# Patient Record
Sex: MALE | Race: WHITE | Employment: UNEMPLOYED | URBAN - METROPOLITAN AREA
[De-identification: names, ages, dates, MRNs, and addresses within clinical notes are randomized per-mention and may not be internally consistent; named-entity substitution may affect disease eponyms.]

---

## 2017-01-04 ENCOUNTER — OFFICE VISIT (OUTPATIENT)
Dept: INTERNAL MEDICINE CLINIC | Age: 62
End: 2017-01-04

## 2017-01-04 VITALS
WEIGHT: 138 LBS | DIASTOLIC BLOOD PRESSURE: 90 MMHG | HEART RATE: 68 BPM | SYSTOLIC BLOOD PRESSURE: 148 MMHG | HEIGHT: 65 IN | BODY MASS INDEX: 22.99 KG/M2

## 2017-01-04 DIAGNOSIS — M53.3 SACROILIAC DYSFUNCTION: ICD-10-CM

## 2017-01-04 DIAGNOSIS — F17.200 TOBACCO USE DISORDER: Chronic | ICD-10-CM

## 2017-01-04 DIAGNOSIS — M46.1 SACROILIAC INFLAMMATION (HCC): ICD-10-CM

## 2017-01-04 DIAGNOSIS — Z87.891 HISTORY OF SMOKING 30 OR MORE PACK YEARS: ICD-10-CM

## 2017-01-04 DIAGNOSIS — E87.1 HYPONATREMIA: Primary | ICD-10-CM

## 2017-01-04 LAB
ANION GAP SERPL CALCULATED.3IONS-SCNC: 16 MMOL/L (ref 3–16)
BUN BLDV-MCNC: 11 MG/DL (ref 7–20)
CALCIUM SERPL-MCNC: 8.9 MG/DL (ref 8.3–10.6)
CHLORIDE BLD-SCNC: 98 MMOL/L (ref 99–110)
CO2: 23 MMOL/L (ref 21–32)
CREAT SERPL-MCNC: 1 MG/DL (ref 0.8–1.3)
GFR AFRICAN AMERICAN: >60
GFR NON-AFRICAN AMERICAN: >60
GLUCOSE BLD-MCNC: 110 MG/DL (ref 70–99)
POTASSIUM SERPL-SCNC: 4.3 MMOL/L (ref 3.5–5.1)
SODIUM BLD-SCNC: 137 MMOL/L (ref 136–145)

## 2017-01-04 PROCEDURE — 99213 OFFICE O/P EST LOW 20 MIN: CPT | Performed by: INTERNAL MEDICINE

## 2017-01-04 RX ORDER — HYDROCODONE BITARTRATE AND ACETAMINOPHEN 5; 325 MG/1; MG/1
1 TABLET ORAL EVERY 6 HOURS PRN
Qty: 60 TABLET | Refills: 0 | Status: SHIPPED | OUTPATIENT
Start: 2017-03-12 | End: 2017-04-17 | Stop reason: SDUPTHER

## 2017-01-04 RX ORDER — HYDROCODONE BITARTRATE AND ACETAMINOPHEN 5; 325 MG/1; MG/1
1 TABLET ORAL EVERY 6 HOURS PRN
Qty: 60 TABLET | Refills: 0 | Status: SHIPPED | OUTPATIENT
Start: 2017-02-10 | End: 2017-03-12

## 2017-01-04 RX ORDER — HYDROCODONE BITARTRATE AND ACETAMINOPHEN 5; 325 MG/1; MG/1
1 TABLET ORAL EVERY 6 HOURS PRN
Qty: 60 TABLET | Refills: 0 | Status: SHIPPED | OUTPATIENT
Start: 2017-01-11 | End: 2017-02-10

## 2017-01-09 LAB
6-ACETYLMORPHINE: NOT DETECTED
7-AMINOCLONAZEPAM: NOT DETECTED
ALPHA-OH-ALPRAZOLAM: PRESENT
ALPRAZOLAM: PRESENT
AMPHETAMINE: NOT DETECTED
BARBITURATES: NOT DETECTED
BENZOYLECGONINE: NOT DETECTED
BUPRENORPHINE: NOT DETECTED
CARISOPRODOL: NOT DETECTED
CLONAZEPAM: NOT DETECTED
CODEINE: NOT DETECTED
CREATININE URINE: 82.8 MG/DL (ref 20–400)
DIAZEPAM: NOT DETECTED
DRUGS EXPECTED: NORMAL
EER PAIN MGT DRUG PANEL, HIGH RES/EMIT U: NORMAL
ETHYL GLUCURONIDE: PRESENT
FENTANYL: NOT DETECTED
HYDROCODONE: PRESENT
HYDROMORPHONE: NOT DETECTED
LORAZEPAM: NOT DETECTED
MARIJUANA METABOLITE: NOT DETECTED
MDA: NOT DETECTED
MDEA: NOT DETECTED
MDMA URINE: NOT DETECTED
MEPERIDINE: NOT DETECTED
METHADONE: NOT DETECTED
METHAMPHETAMINE: NOT DETECTED
METHYLPHENIDATE: NOT DETECTED
MIDAZOLAM: NOT DETECTED
MORPHINE: NOT DETECTED
NORBUPRENORPHINE, FREE: NOT DETECTED
NORDIAZEPAM: NOT DETECTED
NORFENTANYL: NOT DETECTED
NORHYDROCODONE, URINE: PRESENT
NOROXYCODONE: NOT DETECTED
NOROXYMORPHONE, URINE: NOT DETECTED
OXAZEPAM: NOT DETECTED
OXYCODONE: NOT DETECTED
OXYMORPHONE: NOT DETECTED
PAIN MANAGEMENT DRUG PANEL: NORMAL
PAIN MANAGEMENT DRUG PANEL: NORMAL
PCP: NOT DETECTED
PHENTERMINE: NOT DETECTED
PROPOXYPHENE: NOT DETECTED
TAPENTADOL, URINE: NOT DETECTED
TAPENTADOL-O-SULFATE, URINE: NOT DETECTED
TEMAZEPAM: NOT DETECTED
TRAMADOL: NOT DETECTED
ZOLPIDEM: NOT DETECTED

## 2017-03-27 ENCOUNTER — OFFICE VISIT (OUTPATIENT)
Dept: INTERNAL MEDICINE CLINIC | Age: 62
End: 2017-03-27

## 2017-03-27 VITALS
HEIGHT: 65 IN | DIASTOLIC BLOOD PRESSURE: 90 MMHG | BODY MASS INDEX: 23.16 KG/M2 | WEIGHT: 139 LBS | HEART RATE: 64 BPM | SYSTOLIC BLOOD PRESSURE: 148 MMHG

## 2017-03-27 DIAGNOSIS — J20.9 ACUTE BRONCHITIS, UNSPECIFIED ORGANISM: ICD-10-CM

## 2017-03-27 DIAGNOSIS — F17.210 NICOTINE DEPENDENCE, CIGARETTES, UNCOMPLICATED: Chronic | ICD-10-CM

## 2017-03-27 DIAGNOSIS — I20.8 ANGINAL CHEST PAIN AT REST (HCC): Primary | ICD-10-CM

## 2017-03-27 DIAGNOSIS — J43.1 PANLOBULAR EMPHYSEMA (HCC): Chronic | ICD-10-CM

## 2017-03-27 PROCEDURE — G9016 DEMO-SMOKING CESSATION COUN: HCPCS | Performed by: INTERNAL MEDICINE

## 2017-03-27 PROCEDURE — 99214 OFFICE O/P EST MOD 30 MIN: CPT | Performed by: INTERNAL MEDICINE

## 2017-03-27 RX ORDER — LEVOFLOXACIN 500 MG/1
500 TABLET, FILM COATED ORAL DAILY
Qty: 10 TABLET | Refills: 0 | Status: SHIPPED | OUTPATIENT
Start: 2017-03-27 | End: 2017-04-06

## 2017-03-27 ASSESSMENT — ENCOUNTER SYMPTOMS
SHORTNESS OF BREATH: 1
APNEA: 0
WHEEZING: 1
COUGH: 1

## 2017-03-28 ENCOUNTER — TELEPHONE (OUTPATIENT)
Dept: INTERNAL MEDICINE CLINIC | Age: 62
End: 2017-03-28

## 2017-03-28 DIAGNOSIS — I10 ESSENTIAL HYPERTENSION: Primary | ICD-10-CM

## 2017-04-04 ENCOUNTER — HOSPITAL ENCOUNTER (OUTPATIENT)
Dept: NON INVASIVE DIAGNOSTICS | Age: 62
Discharge: HOME | End: 2017-04-04
Attending: INTERNAL MEDICINE | Admitting: INTERNAL MEDICINE

## 2017-04-04 LAB
A/G RATIO: 2.2 (ref 1.1–2.2)
ALBUMIN SERPL-MCNC: 4.6 G/DL (ref 3.4–5)
ALP BLD-CCNC: 64 U/L (ref 40–129)
ALT SERPL-CCNC: 19 U/L (ref 10–40)
ANION GAP SERPL CALCULATED.3IONS-SCNC: 14 MMOL/L (ref 3–16)
AST SERPL-CCNC: 21 U/L (ref 15–37)
BILIRUB SERPL-MCNC: 0.4 MG/DL (ref 0–1)
BUN BLDV-MCNC: 6 MG/DL (ref 7–20)
CALCIUM SERPL-MCNC: 8.7 MG/DL (ref 8.3–10.6)
CHLORIDE BLD-SCNC: 99 MMOL/L (ref 99–110)
CO2: 23 MMOL/L (ref 21–32)
CREAT SERPL-MCNC: 0.6 MG/DL (ref 0.8–1.3)
GFR AFRICAN AMERICAN: >60
GFR NON-AFRICAN AMERICAN: >60
GLOBULIN: 2.1 G/DL
GLUCOSE BLD-MCNC: 85 MG/DL (ref 70–99)
LV EF: 73 %
LVEF MODALITY: NORMAL
POTASSIUM SERPL-SCNC: 4.2 MMOL/L (ref 3.5–5.1)
SODIUM BLD-SCNC: 136 MMOL/L (ref 136–145)
TOTAL PROTEIN: 6.7 G/DL (ref 6.4–8.2)

## 2017-04-04 RX ORDER — AMINOPHYLLINE DIHYDRATE 25 MG/ML
100 INJECTION, SOLUTION INTRAVENOUS ONCE
Status: COMPLETED | OUTPATIENT
Start: 2017-04-04 | End: 2017-04-04

## 2017-04-04 RX ADMIN — AMINOPHYLLINE DIHYDRATE 100 MG: 25 INJECTION, SOLUTION INTRAVENOUS at 09:33

## 2017-04-17 DIAGNOSIS — J43.1 PANLOBULAR EMPHYSEMA (HCC): Chronic | ICD-10-CM

## 2017-04-17 DIAGNOSIS — M46.1 SACROILIAC INFLAMMATION (HCC): ICD-10-CM

## 2017-04-17 DIAGNOSIS — M53.3 SACROILIAC DYSFUNCTION: ICD-10-CM

## 2017-04-17 RX ORDER — FLUTICASONE FUROATE AND VILANTEROL 100; 25 UG/1; UG/1
POWDER RESPIRATORY (INHALATION)
Qty: 1 EACH | Refills: 5 | Status: SHIPPED | OUTPATIENT
Start: 2017-04-17 | End: 2018-03-16

## 2017-04-17 RX ORDER — OXYCODONE HYDROCHLORIDE AND ACETAMINOPHEN 5; 325 MG/1; MG/1
1 TABLET ORAL EVERY 4 HOURS PRN
Qty: 60 TABLET | Refills: 0 | Status: SHIPPED | OUTPATIENT
Start: 2017-04-17 | End: 2017-04-17 | Stop reason: CLARIF

## 2017-04-17 RX ORDER — HYDROCODONE BITARTRATE AND ACETAMINOPHEN 5; 325 MG/1; MG/1
1 TABLET ORAL EVERY 6 HOURS PRN
Qty: 40 TABLET | Refills: 0 | Status: SHIPPED | OUTPATIENT
Start: 2017-04-17 | End: 2017-05-17

## 2017-04-17 RX ORDER — ALBUTEROL SULFATE 90 UG/1
2 AEROSOL, METERED RESPIRATORY (INHALATION) EVERY 6 HOURS PRN
Qty: 1 INHALER | Refills: 3 | Status: SHIPPED | OUTPATIENT
Start: 2017-04-17 | End: 2017-10-04 | Stop reason: SDUPTHER

## 2017-04-24 ENCOUNTER — OFFICE VISIT (OUTPATIENT)
Dept: INTERNAL MEDICINE CLINIC | Age: 62
End: 2017-04-24

## 2017-04-24 VITALS
WEIGHT: 138 LBS | HEIGHT: 65 IN | BODY MASS INDEX: 22.99 KG/M2 | DIASTOLIC BLOOD PRESSURE: 76 MMHG | SYSTOLIC BLOOD PRESSURE: 112 MMHG | HEART RATE: 64 BPM

## 2017-04-24 DIAGNOSIS — R07.9 CHEST PAIN, UNSPECIFIED TYPE: Primary | ICD-10-CM

## 2017-04-24 DIAGNOSIS — D75.89 MACROCYTOSIS WITHOUT ANEMIA: ICD-10-CM

## 2017-04-24 DIAGNOSIS — F41.9 ANXIETY: Chronic | ICD-10-CM

## 2017-04-24 PROCEDURE — 99213 OFFICE O/P EST LOW 20 MIN: CPT | Performed by: INTERNAL MEDICINE

## 2017-04-24 RX ORDER — OMEPRAZOLE 20 MG/1
20 CAPSULE, DELAYED RELEASE ORAL
Qty: 30 CAPSULE | Refills: 3 | Status: SHIPPED | OUTPATIENT
Start: 2017-04-24 | End: 2019-10-21

## 2017-06-19 ENCOUNTER — TELEPHONE (OUTPATIENT)
Dept: INTERNAL MEDICINE CLINIC | Age: 62
End: 2017-06-19

## 2017-06-19 DIAGNOSIS — M25.552 HIP PAIN, ACUTE, LEFT: Primary | ICD-10-CM

## 2017-06-22 ENCOUNTER — HOSPITAL ENCOUNTER (OUTPATIENT)
Dept: CT IMAGING | Age: 62
Discharge: HOME | End: 2017-06-22
Attending: PHYSICIAN ASSISTANT | Admitting: PHYSICIAN ASSISTANT

## 2017-06-22 ENCOUNTER — TELEPHONE (OUTPATIENT)
Dept: ORTHOPEDIC SURGERY | Age: 62
End: 2017-06-22

## 2017-06-22 ENCOUNTER — OFFICE VISIT (OUTPATIENT)
Dept: ORTHOPEDIC SURGERY | Age: 62
End: 2017-06-22

## 2017-06-22 VITALS
HEIGHT: 65 IN | SYSTOLIC BLOOD PRESSURE: 161 MMHG | BODY MASS INDEX: 24.16 KG/M2 | TEMPERATURE: 98.7 F | DIASTOLIC BLOOD PRESSURE: 96 MMHG | WEIGHT: 145 LBS | HEART RATE: 88 BPM | RESPIRATION RATE: 16 BRPM

## 2017-06-22 DIAGNOSIS — M17.12 ARTHRITIS OF LEFT KNEE: ICD-10-CM

## 2017-06-22 DIAGNOSIS — M25.552 PAIN OF LEFT HIP JOINT: Primary | ICD-10-CM

## 2017-06-22 DIAGNOSIS — M25.562 ACUTE PAIN OF LEFT KNEE: ICD-10-CM

## 2017-06-22 DIAGNOSIS — M25.552 PAIN OF LEFT HIP JOINT: ICD-10-CM

## 2017-06-22 DIAGNOSIS — M25.552 PAIN IN LEFT HIP: ICD-10-CM

## 2017-06-22 DIAGNOSIS — M16.12 ARTHRITIS OF LEFT HIP: ICD-10-CM

## 2017-06-22 PROCEDURE — 99203 OFFICE O/P NEW LOW 30 MIN: CPT | Performed by: PHYSICIAN ASSISTANT

## 2017-06-29 ENCOUNTER — OFFICE VISIT (OUTPATIENT)
Dept: ORTHOPEDIC SURGERY | Age: 62
End: 2017-06-29

## 2017-06-29 ENCOUNTER — HOSPITAL ENCOUNTER (OUTPATIENT)
Dept: GENERAL RADIOLOGY | Age: 62
Discharge: HOME | End: 2017-06-29
Attending: ORTHOPAEDIC SURGERY | Admitting: ORTHOPAEDIC SURGERY

## 2017-06-29 ENCOUNTER — TELEPHONE (OUTPATIENT)
Dept: INTERNAL MEDICINE CLINIC | Age: 62
End: 2017-06-29

## 2017-06-29 DIAGNOSIS — M19.90 OSTEOARTHRITIS: ICD-10-CM

## 2017-06-29 DIAGNOSIS — M19.90 UNSPECIFIED OSTEOARTHRITIS, UNSPECIFIED SITE: ICD-10-CM

## 2017-06-29 DIAGNOSIS — M16.12 PRIMARY OSTEOARTHRITIS OF LEFT HIP: Primary | ICD-10-CM

## 2017-07-05 ENCOUNTER — OFFICE VISIT (OUTPATIENT)
Dept: INTERNAL MEDICINE CLINIC | Age: 62
End: 2017-07-05

## 2017-07-05 VITALS
HEART RATE: 84 BPM | BODY MASS INDEX: 22.49 KG/M2 | WEIGHT: 135 LBS | DIASTOLIC BLOOD PRESSURE: 98 MMHG | HEIGHT: 65 IN | SYSTOLIC BLOOD PRESSURE: 156 MMHG

## 2017-07-05 DIAGNOSIS — F41.9 ANXIETY: Chronic | ICD-10-CM

## 2017-07-05 DIAGNOSIS — M51.37 DDD (DEGENERATIVE DISC DISEASE), LUMBOSACRAL: Primary | ICD-10-CM

## 2017-07-05 PROCEDURE — 99213 OFFICE O/P EST LOW 20 MIN: CPT | Performed by: INTERNAL MEDICINE

## 2017-07-05 RX ORDER — ALPRAZOLAM 0.5 MG/1
0.5 TABLET ORAL 3 TIMES DAILY PRN
Qty: 60 TABLET | Refills: 0 | Status: SHIPPED | OUTPATIENT
Start: 2017-07-05 | End: 2017-09-11 | Stop reason: SDUPTHER

## 2017-07-10 ENCOUNTER — OFFICE VISIT (OUTPATIENT)
Dept: PSYCHIATRY | Age: 62
End: 2017-07-10

## 2017-07-10 VITALS
BODY MASS INDEX: 22.99 KG/M2 | HEART RATE: 72 BPM | WEIGHT: 138 LBS | HEIGHT: 65 IN | DIASTOLIC BLOOD PRESSURE: 84 MMHG | SYSTOLIC BLOOD PRESSURE: 138 MMHG

## 2017-07-10 DIAGNOSIS — F43.9 TRAUMA AND STRESSOR-RELATED DISORDER: ICD-10-CM

## 2017-07-10 DIAGNOSIS — F33.1 MODERATE EPISODE OF RECURRENT MAJOR DEPRESSIVE DISORDER (HCC): Primary | ICD-10-CM

## 2017-07-10 DIAGNOSIS — F41.1 GENERALIZED ANXIETY DISORDER: ICD-10-CM

## 2017-07-10 PROCEDURE — 99204 OFFICE O/P NEW MOD 45 MIN: CPT | Performed by: PSYCHIATRY & NEUROLOGY

## 2017-07-10 RX ORDER — ALPRAZOLAM 0.5 MG/1
0.5 TABLET ORAL 3 TIMES DAILY PRN
Qty: 90 TABLET | Refills: 0 | Status: SHIPPED | OUTPATIENT
Start: 2017-07-27 | End: 2017-08-26

## 2017-07-10 ASSESSMENT — PATIENT HEALTH QUESTIONNAIRE - PHQ9
8. MOVING OR SPEAKING SO SLOWLY THAT OTHER PEOPLE COULD HAVE NOTICED. OR THE OPPOSITE, BEING SO FIGETY OR RESTLESS THAT YOU HAVE BEEN MOVING AROUND A LOT MORE THAN USUAL: 3
1. LITTLE INTEREST OR PLEASURE IN DOING THINGS: 2
SUM OF ALL RESPONSES TO PHQ QUESTIONS 1-9: 18
2. FEELING DOWN, DEPRESSED OR HOPELESS: 2
3. TROUBLE FALLING OR STAYING ASLEEP: 3
9. THOUGHTS THAT YOU WOULD BE BETTER OFF DEAD, OR OF HURTING YOURSELF: 0
SUM OF ALL RESPONSES TO PHQ9 QUESTIONS 1 & 2: 4
4. FEELING TIRED OR HAVING LITTLE ENERGY: 2
7. TROUBLE CONCENTRATING ON THINGS, SUCH AS READING THE NEWSPAPER OR WATCHING TELEVISION: 3
6. FEELING BAD ABOUT YOURSELF - OR THAT YOU ARE A FAILURE OR HAVE LET YOURSELF OR YOUR FAMILY DOWN: 1
5. POOR APPETITE OR OVEREATING: 2
10. IF YOU CHECKED OFF ANY PROBLEMS, HOW DIFFICULT HAVE THESE PROBLEMS MADE IT FOR YOU TO DO YOUR WORK, TAKE CARE OF THINGS AT HOME, OR GET ALONG WITH OTHER PEOPLE: 2

## 2017-07-10 ASSESSMENT — ANXIETY QUESTIONNAIRES
6. BECOMING EASILY ANNOYED OR IRRITABLE: 3-NEARLY EVERY DAY
3. WORRYING TOO MUCH ABOUT DIFFERENT THINGS: 3-NEARLY EVERY DAY
GAD7 TOTAL SCORE: 21
5. BEING SO RESTLESS THAT IT IS HARD TO SIT STILL: 3-NEARLY EVERY DAY
4. TROUBLE RELAXING: 3-NEARLY EVERY DAY
1. FEELING NERVOUS, ANXIOUS, OR ON EDGE: 3-NEARLY EVERY DAY
2. NOT BEING ABLE TO STOP OR CONTROL WORRYING: 3-NEARLY EVERY DAY
7. FEELING AFRAID AS IF SOMETHING AWFUL MIGHT HAPPEN: 3-NEARLY EVERY DAY

## 2017-09-11 ENCOUNTER — TELEPHONE (OUTPATIENT)
Dept: RHEUMATOLOGY | Age: 62
End: 2017-09-11

## 2017-09-11 DIAGNOSIS — F41.9 ANXIETY: Chronic | ICD-10-CM

## 2017-09-11 RX ORDER — ALPRAZOLAM 0.5 MG/1
0.5 TABLET ORAL 3 TIMES DAILY PRN
Qty: 80 TABLET | Refills: 0 | Status: SHIPPED | OUTPATIENT
Start: 2017-09-11 | End: 2017-10-02 | Stop reason: SDUPTHER

## 2017-09-14 ENCOUNTER — OFFICE VISIT (OUTPATIENT)
Dept: ORTHOPEDIC SURGERY | Age: 62
End: 2017-09-14

## 2017-09-14 VITALS
BODY MASS INDEX: 24.16 KG/M2 | HEIGHT: 65 IN | TEMPERATURE: 98.6 F | DIASTOLIC BLOOD PRESSURE: 81 MMHG | HEART RATE: 55 BPM | WEIGHT: 145 LBS | SYSTOLIC BLOOD PRESSURE: 151 MMHG

## 2017-09-14 DIAGNOSIS — M16.12 PRIMARY OSTEOARTHRITIS OF LEFT HIP: ICD-10-CM

## 2017-09-14 DIAGNOSIS — S69.92XA THUMB INJURY, LEFT, INITIAL ENCOUNTER: Primary | ICD-10-CM

## 2017-09-14 DIAGNOSIS — M17.12 ARTHRITIS OF LEFT KNEE: ICD-10-CM

## 2017-09-14 DIAGNOSIS — M18.12 ARTHRITIS OF CARPOMETACARPAL (CMC) JOINT OF LEFT THUMB: ICD-10-CM

## 2017-09-14 PROBLEM — S69.90XA THUMB INJURY: Status: ACTIVE | Noted: 2017-09-14

## 2017-09-14 PROCEDURE — 99214 OFFICE O/P EST MOD 30 MIN: CPT | Performed by: PHYSICIAN ASSISTANT

## 2017-09-14 PROCEDURE — L3908 WHO COCK-UP NONMOLDE PRE OTS: HCPCS | Performed by: PHYSICIAN ASSISTANT

## 2017-09-18 ENCOUNTER — TELEPHONE (OUTPATIENT)
Dept: ORTHOPEDIC SURGERY | Age: 62
End: 2017-09-18

## 2017-09-18 ENCOUNTER — OFFICE VISIT (OUTPATIENT)
Dept: ORTHOPEDIC SURGERY | Age: 62
End: 2017-09-18

## 2017-09-18 VITALS
BODY MASS INDEX: 24.16 KG/M2 | WEIGHT: 145 LBS | HEIGHT: 65 IN | DIASTOLIC BLOOD PRESSURE: 75 MMHG | RESPIRATION RATE: 15 BRPM | SYSTOLIC BLOOD PRESSURE: 132 MMHG | HEART RATE: 65 BPM

## 2017-09-18 DIAGNOSIS — S62.525A CLOSED NONDISPLACED FRACTURE OF DISTAL PHALANX OF LEFT THUMB, INITIAL ENCOUNTER: Primary | ICD-10-CM

## 2017-09-18 PROCEDURE — 26750 TREAT FINGER FRACTURE EACH: CPT | Performed by: ORTHOPAEDIC SURGERY

## 2017-09-18 PROCEDURE — 99214 OFFICE O/P EST MOD 30 MIN: CPT | Performed by: ORTHOPAEDIC SURGERY

## 2017-09-20 ENCOUNTER — TELEPHONE (OUTPATIENT)
Dept: ORTHOPEDIC SURGERY | Age: 62
End: 2017-09-20

## 2017-10-02 ENCOUNTER — OFFICE VISIT (OUTPATIENT)
Dept: PSYCHIATRY | Age: 62
End: 2017-10-02

## 2017-10-02 VITALS
WEIGHT: 136.8 LBS | SYSTOLIC BLOOD PRESSURE: 144 MMHG | BODY MASS INDEX: 22.79 KG/M2 | HEIGHT: 65 IN | DIASTOLIC BLOOD PRESSURE: 74 MMHG | HEART RATE: 64 BPM

## 2017-10-02 DIAGNOSIS — F43.9 TRAUMA AND STRESSOR-RELATED DISORDER: ICD-10-CM

## 2017-10-02 DIAGNOSIS — F33.1 MODERATE EPISODE OF RECURRENT MAJOR DEPRESSIVE DISORDER (HCC): Primary | Chronic | ICD-10-CM

## 2017-10-02 DIAGNOSIS — F41.1 GENERALIZED ANXIETY DISORDER: ICD-10-CM

## 2017-10-02 PROCEDURE — 99214 OFFICE O/P EST MOD 30 MIN: CPT | Performed by: PSYCHIATRY & NEUROLOGY

## 2017-10-02 RX ORDER — TRAZODONE HYDROCHLORIDE 50 MG/1
50 TABLET ORAL NIGHTLY PRN
Qty: 30 TABLET | Refills: 1 | Status: SHIPPED | OUTPATIENT
Start: 2017-10-02 | End: 2017-12-04 | Stop reason: SDUPTHER

## 2017-10-02 RX ORDER — ALPRAZOLAM 0.5 MG/1
TABLET ORAL
Qty: 70 TABLET | Refills: 0 | Status: SHIPPED | OUTPATIENT
Start: 2017-10-02 | End: 2017-10-02 | Stop reason: DRUGHIGH

## 2017-10-02 RX ORDER — ALPRAZOLAM 0.5 MG/1
TABLET ORAL
Qty: 70 TABLET | Refills: 0 | Status: SHIPPED | OUTPATIENT
Start: 2017-10-12 | End: 2017-11-14 | Stop reason: SDUPTHER

## 2017-10-02 ASSESSMENT — ANXIETY QUESTIONNAIRES
4. TROUBLE RELAXING: 3-NEARLY EVERY DAY
1. FEELING NERVOUS, ANXIOUS, OR ON EDGE: 3-NEARLY EVERY DAY
3. WORRYING TOO MUCH ABOUT DIFFERENT THINGS: 3-NEARLY EVERY DAY
2. NOT BEING ABLE TO STOP OR CONTROL WORRYING: 2-OVER HALF THE DAYS
GAD7 TOTAL SCORE: 19
5. BEING SO RESTLESS THAT IT IS HARD TO SIT STILL: 3-NEARLY EVERY DAY
7. FEELING AFRAID AS IF SOMETHING AWFUL MIGHT HAPPEN: 2-OVER HALF THE DAYS
6. BECOMING EASILY ANNOYED OR IRRITABLE: 3-NEARLY EVERY DAY

## 2017-10-02 NOTE — MR AVS SNAPSHOT
These changes are accurate as of: 10/2/17 12:05 PM.  If you have any questions, ask your nurse or doctor. START taking these medications           traZODone 50 MG tablet   Commonly known as:  DESYREL   Instructions: Take 1 tablet by mouth nightly as needed for Sleep   Quantity:  30 tablet   Refills:  1         CHANGE how you take these medications           ALPRAZolam 0.5 MG tablet   Commonly known as:  [place]   Instructions: Take 1 tab PO BID, can take an additional 1 tab daily prn for anxiety up to 20 days out of the month. Must last 30 days. Quantity:  70 tablet   Refills:  0   What changed:    - how much to take  - how to take this  - when to take this  - reasons to take this  - additional instructions         STOP taking these medications           carBAMazepine 200 MG tablet   Commonly known as:  EPITOL            Where to Get Your Medications      These medications were sent to [place] [address] [facility] [phone]  [address] [place] [address]     Phone:  [phone]     ALPRAZolam 0.5 MG tablet    traZODone 50 MG tablet               Your Current Medications Are              traZODone (DESYREL) 50 MG tablet Take 1 tablet by mouth nightly as needed for Sleep    ALPRAZolam (XANAX) 0.5 MG tablet Take 1 tab PO BID, can take an additional 1 tab daily prn for anxiety up to 20 days out of the month. Must last 30 days.     sertraline (ZOLOFT) 50 MG tablet Take 1 tablet by mouth daily    omeprazole (PRILOSEC) 20 MG delayed release capsule Take 1 capsule by mouth daily (with breakfast)    lisinopril (PRINIVIL;ZESTRIL) 20 MG tablet TAKE ONE TABLET BY MOUTH DAILY    fluticasone-vilanterol (BREO ELLIPTA) 100-25 MCG/INH AEPB inhaler INHALE ONE DOSE BY MOUTH DAILY    albuterol sulfate HFA (PROAIR HFA) 108 (90 BASE) MCG/ACT inhaler Inhale 2 puffs into the lungs every 6 hours as needed for Wheezing SUMAtriptan (IMITREX) 50 MG tablet Take every 4 hours PRN headache. Do not exceed 4 in a 24 hours period. atenolol (TENORMIN) 50 MG tablet TAKE ONE TABLET BY MOUTH DAILY    topiramate (TOPAMAX) 50 MG tablet Take 1 tablet by mouth 2 times daily    aspirin EC 81 MG EC tablet Take 1 tablet by mouth daily.       Allergies           No Known Allergies         Additional Information        Basic Information     Date Of Birth Sex Race Ethnicity Preferred Language    1955 Male White Non-/Non  English      Problem List as of 10/2/2017  Date Reviewed: 9/14/2017                Arthritis of carpometacarpal (CMC) joint of left thumb    Primary osteoarthritis of left hip    Thumb injury    Trauma and stressor-related disorder    Moderate episode of recurrent major depressive disorder (HCC) (Chronic)    Arthritis of left knee    Arthritis of left hip    Nicotine dependence, cigarettes, uncomplicated (Chronic)    Recurrent left inguinal hernia    Lung nodule < 6cm on CT (Chronic)    Panlobular emphysema (HCC) (Chronic)    Restless leg syndrome (Chronic)    Recurrent inguinal hernia    DDD (degenerative disc disease), lumbosacral    Sacroiliac dysfunction    S/P lumbar fusion    Alcohol dependence (HCC) (Chronic)    Sciatica    Cramps of lower extremity    Migraine without status migrainosus, not intractable    Episode of syncope    Partial epilepsy with impairment of consciousness, intractable ([address]    Partial epilepsy with impairment of consciousness ([address]    Intractable migraine without aura    Sacroiliac inflammation ([address]    Back pain    SI (sacroiliac) pain    Erectile dysfunction      Immunizations as of 10/2/2017     Name Date    Influenza Virus Vaccine 10/6/2015, 11/11/2014    Pneumococcal Polysaccharide ([id number]) 10/6/2015    Tdap (Boostrix, Adacel) 7/29/2015      Preventive Care        Date Due    Zoster Vaccine 8/27/2014    Yearly Flu Vaccine (1) 9/1/2017    Cholesterol Screening 11/11/2019 Colonoscopy 10/24/2021    Tetanus Combination Vaccine (2 - Td) 7/29/2025            MyChart Signup           Our records indicate that you have an active [employer] account. You can view your After Visit Summary by going to [email or web address] and logging in with your [facility] username and password. If you don't have a [facility] username and password but a parent or guardian has access to your record, the parent or guardian should login with their own [employer] username and password and access your record to view the After Visit Summary. Additional Information  If you have questions, please contact the physician practice where you receive care. Remember, [facility] is NOT to be used for urgent needs. For medical emergencies, dial 911. For questions regarding your [facility] account call [phone]. If you have a clinical question, please call your doctor's office.

## 2017-10-02 NOTE — PATIENT INSTRUCTIONS
Let me know in 2 weeks how you are doing with the zoloft. We can increase the dose at that time if you aren't noticing much of a difference.
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
## 2017-10-03 RX ORDER — ATENOLOL 50 MG/1
TABLET ORAL
Qty: 90 TABLET | Refills: 1 | Status: SHIPPED | OUTPATIENT
Start: 2017-10-03 | End: 2017-10-04 | Stop reason: SDUPTHER

## 2017-10-04 ENCOUNTER — OFFICE VISIT (OUTPATIENT)
Dept: INTERNAL MEDICINE CLINIC | Age: 62
End: 2017-10-04

## 2017-10-04 VITALS
DIASTOLIC BLOOD PRESSURE: 80 MMHG | WEIGHT: 139 LBS | BODY MASS INDEX: 23.13 KG/M2 | HEART RATE: 60 BPM | SYSTOLIC BLOOD PRESSURE: 142 MMHG

## 2017-10-04 DIAGNOSIS — I10 ESSENTIAL HYPERTENSION: ICD-10-CM

## 2017-10-04 DIAGNOSIS — J43.1 PANLOBULAR EMPHYSEMA (HCC): Chronic | ICD-10-CM

## 2017-10-04 DIAGNOSIS — D69.2 SENILE PURPURA (HCC): ICD-10-CM

## 2017-10-04 DIAGNOSIS — F33.1 MODERATE EPISODE OF RECURRENT MAJOR DEPRESSIVE DISORDER (HCC): Primary | Chronic | ICD-10-CM

## 2017-10-04 DIAGNOSIS — M46.1 SACROILIAC INFLAMMATION (HCC): ICD-10-CM

## 2017-10-04 PROBLEM — S69.90XA THUMB INJURY: Status: RESOLVED | Noted: 2017-09-14 | Resolved: 2017-10-04

## 2017-10-04 PROBLEM — F17.210 NICOTINE DEPENDENCE, CIGARETTES, UNCOMPLICATED: Chronic | Status: RESOLVED | Noted: 2017-03-27 | Resolved: 2017-10-04

## 2017-10-04 PROBLEM — M17.12 ARTHRITIS OF LEFT KNEE: Status: RESOLVED | Noted: 2017-06-22 | Resolved: 2017-10-04

## 2017-10-04 PROBLEM — M16.12 PRIMARY OSTEOARTHRITIS OF LEFT HIP: Status: RESOLVED | Noted: 2017-09-14 | Resolved: 2017-10-04

## 2017-10-04 LAB
BASOPHILS ABSOLUTE: 0.1 K/UL (ref 0–0.2)
BASOPHILS RELATIVE PERCENT: 1.6 %
EOSINOPHILS ABSOLUTE: 0.1 K/UL (ref 0–0.6)
EOSINOPHILS RELATIVE PERCENT: 1.9 %
HCT VFR BLD CALC: 40 % (ref 40.5–52.5)
HEMOGLOBIN: 13.8 G/DL (ref 13.5–17.5)
LYMPHOCYTES ABSOLUTE: 1 K/UL (ref 1–5.1)
LYMPHOCYTES RELATIVE PERCENT: 19.5 %
MCH RBC QN AUTO: 34.4 PG (ref 26–34)
MCHC RBC AUTO-ENTMCNC: 34.5 G/DL (ref 31–36)
MCV RBC AUTO: 99.4 FL (ref 80–100)
MONOCYTES ABSOLUTE: 0.5 K/UL (ref 0–1.3)
MONOCYTES RELATIVE PERCENT: 9.3 %
NEUTROPHILS ABSOLUTE: 3.4 K/UL (ref 1.7–7.7)
NEUTROPHILS RELATIVE PERCENT: 67.7 %
PDW BLD-RTO: 13.1 % (ref 12.4–15.4)
PLATELET # BLD: 216 K/UL (ref 135–450)
PMV BLD AUTO: 7.6 FL (ref 5–10.5)
RBC # BLD: 4.02 M/UL (ref 4.2–5.9)
WBC # BLD: 5.1 K/UL (ref 4–11)

## 2017-10-04 PROCEDURE — 99214 OFFICE O/P EST MOD 30 MIN: CPT | Performed by: INTERNAL MEDICINE

## 2017-10-04 RX ORDER — ATENOLOL 100 MG/1
100 TABLET ORAL DAILY
Qty: 90 TABLET | Refills: 3 | Status: SHIPPED | OUTPATIENT
Start: 2017-10-04 | End: 2017-11-27 | Stop reason: SDUPTHER

## 2017-10-04 RX ORDER — ALBUTEROL SULFATE 90 UG/1
2 AEROSOL, METERED RESPIRATORY (INHALATION) EVERY 6 HOURS PRN
Qty: 1 INHALER | Refills: 3 | Status: SHIPPED | OUTPATIENT
Start: 2017-10-04 | End: 2018-10-19 | Stop reason: SDUPTHER

## 2017-10-04 RX ORDER — SERTRALINE HYDROCHLORIDE 100 MG/1
100 TABLET, FILM COATED ORAL DAILY
Qty: 30 TABLET | Refills: 5 | Status: ON HOLD | OUTPATIENT
Start: 2017-10-04 | End: 2017-12-15 | Stop reason: HOSPADM

## 2017-10-04 NOTE — PROGRESS NOTES
Subjective:      Patient ID: Radha Warren is a 60 y.o. male. HPI  Radha Warren returns for follow up of hypertension. Patient has been taking His medications as prescribed. Patient's blood pressure is not controlled. Side effects related to taking the medications include no medication side effects noted    Radha Warren returns to the office for follow up of osteoarthritis. Pain is located in the back - lumbar/sacral  Radiation?: yes - occasionally. Loss of bowel/urine control?  no. She has been taking medication for treatment of symptoms for year(s). Medications for treatment of pain include Cataflam.  Patient reports good relief of symptoms. Pain is not associated with other symptoms. Radha Warren comes for depression. He does take medication for His condition. He is not suicidal or homicidal.  The depression is not associated with excessive sleeping, anhedonia or anxiety. Patient does not have bipolar disease. He still has some symptoms. Review of Systems    Objective:   Physical Exam   Constitutional: He is oriented to person, place, and time. He appears well-developed and well-nourished. No distress. HENT:   Head: Normocephalic and atraumatic. Pulmonary/Chest: Effort normal. No respiratory distress. Neurological: He is alert and oriented to person, place, and time. No cranial nerve deficit. Skin: Skin is warm and dry. He is not diaphoretic. Purpuric lesions on arms neck and chest.   Psychiatric: He has a normal mood and affect. His behavior is normal. Judgment and thought content normal.   Vitals reviewed. Assessment/Plan:  Felicita Steve was seen today for fatigue and neck pain. Diagnoses and all orders for this visit:    Moderate episode of recurrent major depressive disorder (HCC)  -     sertraline (ZOLOFT) 100 MG tablet;  Take 1 tablet by mouth daily    Sacroiliac inflammation (HCC)    Panlobular emphysema (HCC)  -     albuterol sulfate HFA (PROAIR HFA) 108 (90 Base)

## 2017-10-04 NOTE — MR AVS SNAPSHOT
After Visit Summary             Bayron Mora   10/4/2017 10:45 AM   Office Visit    Description:  Male : 1955   Provider:  Dave Kennedy MD   Department:  [facility]              Your Follow-Up and Future Appointments         Below is a list of your follow-up and future appointments. This may not be a complete list as you may have made appointments directly with providers that we are not aware of or your providers may have made some for you. Please call your providers to confirm appointments. It is important to keep your appointments. Please bring your current insurance card, photo ID, co-pay, and all medication bottles to your appointment. If self-pay, payment is expected at the time of service. Your To-Do List     Future Appointments Provider Department Dept Phone    10/16/2017 1:45 PM Andressa Wisdom MD [address] [facility] [phone]    Please arrive 15 minutes prior to appointment time, bring insurance card and photo ID. Follow-Up    Return in about 3 weeks (around 10/25/2017) for Purpura. Information from Your Visit        Department     Name Address Phone Fax    [facility] [employer] [address] [place] [address] [phone]      You Were Seen for:         Comments    Moderate episode of recurrent major depressive disorder [facility])   [[id number]]         Vital Signs     Blood Pressure Pulse Weight Body Mass Index Smoking Status       142/80 60 139 lb (63 kg) 23.13 kg/m2 Current Every Day Smoker          Today's Medication Changes          These changes are accurate as of: 10/4/17 11:25 AM.  If you have any questions, ask your nurse or doctor. CHANGE how you take these medications           atenolol 100 MG tablet   Commonly known as:  TENORMIN   Instructions:   Take 1 tablet by mouth daily   Quantity:  90 tablet   Refills:  3 have a clinical question, please call your doctor's office.

## 2017-10-25 ENCOUNTER — OFFICE VISIT (OUTPATIENT)
Dept: INTERNAL MEDICINE CLINIC | Age: 62
End: 2017-10-25

## 2017-10-25 VITALS
BODY MASS INDEX: 22.99 KG/M2 | WEIGHT: 138 LBS | HEART RATE: 64 BPM | DIASTOLIC BLOOD PRESSURE: 88 MMHG | SYSTOLIC BLOOD PRESSURE: 136 MMHG | HEIGHT: 65 IN

## 2017-10-25 DIAGNOSIS — D69.2 PURPURA (HCC): Primary | ICD-10-CM

## 2017-10-25 DIAGNOSIS — I10 ESSENTIAL HYPERTENSION: ICD-10-CM

## 2017-10-25 DIAGNOSIS — F17.210 CIGARETTE NICOTINE DEPENDENCE WITHOUT COMPLICATION: ICD-10-CM

## 2017-10-25 DIAGNOSIS — Z23 NEED FOR INFLUENZA VACCINATION: ICD-10-CM

## 2017-10-25 PROCEDURE — 99214 OFFICE O/P EST MOD 30 MIN: CPT | Performed by: INTERNAL MEDICINE

## 2017-10-25 PROCEDURE — G9016 DEMO-SMOKING CESSATION COUN: HCPCS | Performed by: INTERNAL MEDICINE

## 2017-10-25 PROCEDURE — G0008 ADMIN INFLUENZA VIRUS VAC: HCPCS | Performed by: INTERNAL MEDICINE

## 2017-10-25 PROCEDURE — 90686 IIV4 VACC NO PRSV 0.5 ML IM: CPT | Performed by: INTERNAL MEDICINE

## 2017-10-25 NOTE — PROGRESS NOTES
SUBJECTIVE:  Patient ID: Shaina Nazario is a 60 y.o. y.o. male     HPI    Shaina Nazario returns for follow up of hypertension. Patient has been taking His medications as prescribed. Patient's blood pressure is  controlled. Side effects related to taking the medications include no medication side effects noted    His last office visit was noted to have purpura that was diffuse. There was some concern about possible causes being related to thrombocytopenia and/or leukopenia. Review of Systems    OBJECTIVE:    /88   Pulse 64   Ht 5' 5\" (1.651 m)   Wt 138 lb (62.6 kg)   BMI 22.96 kg/m²    Physical Exam   Constitutional: He is oriented to person, place, and time. He appears well-developed and well-nourished. No distress. HENT:   Head: Normocephalic and atraumatic. Pulmonary/Chest: Effort normal. No respiratory distress. Neurological: He is alert and oriented to person, place, and time. No cranial nerve deficit. Skin: Skin is warm and dry. He is not diaphoretic. Psychiatric: He has a normal mood and affect. His behavior is normal. Judgment and thought content normal.   Vitals reviewed. Current Outpatient Prescriptions:     albuterol sulfate HFA (PROAIR HFA) 108 (90 Base) MCG/ACT inhaler, Inhale 2 puffs into the lungs every 6 hours as needed for Wheezing, Disp: 1 Inhaler, Rfl: 3    atenolol (TENORMIN) 100 MG tablet, Take 1 tablet by mouth daily, Disp: 90 tablet, Rfl: 3    sertraline (ZOLOFT) 100 MG tablet, Take 1 tablet by mouth daily, Disp: 30 tablet, Rfl: 5    traZODone (DESYREL) 50 MG tablet, Take 1 tablet by mouth nightly as needed for Sleep, Disp: 30 tablet, Rfl: 1    ALPRAZolam (XANAX) 0.5 MG tablet, Take 1 tab PO BID, can take an additional 1 tab daily prn for anxiety up to 20 days out of the month. Must last 30 days.  Do not fill before 10/12/2017, Disp: 70 tablet, Rfl: 0    omeprazole (PRILOSEC) 20 MG delayed release capsule, Take 1 capsule by mouth daily (with breakfast),

## 2017-11-14 ENCOUNTER — TELEPHONE (OUTPATIENT)
Dept: INTERNAL MEDICINE CLINIC | Age: 62
End: 2017-11-14

## 2017-11-14 RX ORDER — ALPRAZOLAM 0.5 MG/1
0.5 TABLET ORAL 2 TIMES DAILY PRN
Qty: 60 TABLET | Refills: 0 | Status: ON HOLD | OUTPATIENT
Start: 2017-11-14 | End: 2017-12-15 | Stop reason: HOSPADM

## 2017-11-27 ENCOUNTER — TELEPHONE (OUTPATIENT)
Dept: INTERNAL MEDICINE CLINIC | Age: 62
End: 2017-11-27

## 2017-11-27 DIAGNOSIS — I10 ESSENTIAL HYPERTENSION: ICD-10-CM

## 2017-11-27 RX ORDER — ATENOLOL 100 MG/1
100 TABLET ORAL DAILY
Qty: 90 TABLET | Refills: 3 | Status: SHIPPED | OUTPATIENT
Start: 2017-11-27 | End: 2019-10-21 | Stop reason: HOSPADM

## 2017-11-27 NOTE — TELEPHONE ENCOUNTER
Pt is calling needing a refill on Atenolol. Pt was taking 50 mg and it was increased to 100 mg by Dr Anjelica Gamez. They only had 50 mg at [facility] when pt went to get scripted filled and was told to take 2 50 mg daily. Pt is out of medication and [facility] will not refill script. Pt needs new script sent to [facility] for 100 mg of Atenolol. Dinorah on file.

## 2017-11-29 ENCOUNTER — TELEPHONE (OUTPATIENT)
Dept: INTERNAL MEDICINE CLINIC | Age: 62
End: 2017-11-29
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
## 2017-12-04 ENCOUNTER — TELEPHONE (OUTPATIENT)
Dept: INTERNAL MEDICINE CLINIC | Age: 62
End: 2017-12-04

## 2017-12-04 RX ORDER — TRAZODONE HYDROCHLORIDE 50 MG/1
50 TABLET ORAL NIGHTLY PRN
Qty: 30 TABLET | Refills: 1 | Status: ON HOLD | OUTPATIENT
Start: 2017-12-04 | End: 2017-12-15 | Stop reason: HOSPADM

## 2017-12-05 ENCOUNTER — OFFICE VISIT (OUTPATIENT)
Dept: INTERNAL MEDICINE CLINIC | Age: 62
End: 2017-12-05

## 2017-12-05 VITALS
HEIGHT: 65 IN | HEART RATE: 72 BPM | BODY MASS INDEX: 22.33 KG/M2 | WEIGHT: 134 LBS | DIASTOLIC BLOOD PRESSURE: 80 MMHG | SYSTOLIC BLOOD PRESSURE: 142 MMHG

## 2017-12-05 DIAGNOSIS — F33.1 MODERATE EPISODE OF RECURRENT MAJOR DEPRESSIVE DISORDER (HCC): Chronic | ICD-10-CM

## 2017-12-05 DIAGNOSIS — S13.4XXA WHIPLASH INJURY TO NECK, INITIAL ENCOUNTER: Primary | ICD-10-CM

## 2017-12-05 DIAGNOSIS — I10 ESSENTIAL HYPERTENSION: ICD-10-CM

## 2017-12-05 PROCEDURE — 99213 OFFICE O/P EST LOW 20 MIN: CPT | Performed by: INTERNAL MEDICINE

## 2017-12-05 PROCEDURE — 96372 THER/PROPH/DIAG INJ SC/IM: CPT | Performed by: INTERNAL MEDICINE

## 2017-12-05 RX ORDER — HYDROCODONE BITARTRATE AND ACETAMINOPHEN 5; 325 MG/1; MG/1
1 TABLET ORAL EVERY 6 HOURS PRN
Qty: 28 TABLET | Refills: 0 | Status: ON HOLD | OUTPATIENT
Start: 2017-12-05 | End: 2017-12-15 | Stop reason: HOSPADM

## 2017-12-05 RX ORDER — CYCLOBENZAPRINE HCL 10 MG
10 TABLET ORAL 3 TIMES DAILY PRN
Qty: 30 TABLET | Refills: 0 | Status: SHIPPED | OUTPATIENT
Start: 2017-12-05 | End: 2017-12-15

## 2017-12-05 RX ORDER — KETOROLAC TROMETHAMINE 30 MG/ML
60 INJECTION, SOLUTION INTRAMUSCULAR; INTRAVENOUS ONCE
Status: COMPLETED | OUTPATIENT
Start: 2017-12-05 | End: 2017-12-05

## 2017-12-05 RX ORDER — KETOROLAC TROMETHAMINE 30 MG/ML
60 INJECTION, SOLUTION INTRAMUSCULAR; INTRAVENOUS ONCE
Qty: 2 ML | Refills: 0
Start: 2017-12-05 | End: 2017-12-18

## 2017-12-05 RX ADMIN — KETOROLAC TROMETHAMINE 60 MG: 30 INJECTION, SOLUTION INTRAMUSCULAR; INTRAVENOUS at 15:35

## 2017-12-11 ENCOUNTER — TELEPHONE (OUTPATIENT)
Dept: INTERNAL MEDICINE CLINIC | Age: 62
End: 2017-12-11

## 2017-12-11 PROBLEM — F10.931 DELIRIUM TREMENS (HCC): Status: ACTIVE | Noted: 2017-12-11

## 2017-12-11 PROBLEM — G93.40 ACUTE ENCEPHALOPATHY: Status: ACTIVE | Noted: 2017-12-11

## 2017-12-11 PROBLEM — E87.1 ACUTE HYPONATREMIA: Status: ACTIVE | Noted: 2017-12-11

## 2017-12-11 PROBLEM — R44.1: Status: ACTIVE | Noted: 2017-12-11

## 2017-12-11 PROBLEM — D64.9 ANEMIA: Status: ACTIVE | Noted: 2017-12-11

## 2017-12-11 PROBLEM — K70.9 ALCOHOLIC LIVER DISEASE (HCC): Status: ACTIVE | Noted: 2017-12-11

## 2017-12-11 NOTE — TELEPHONE ENCOUNTER
Patient was admitted at [facility].    Evaluated patient and spoke with wife in the hospital.

## 2017-12-15 ENCOUNTER — TELEPHONE (OUTPATIENT)
Dept: INTERNAL MEDICINE CLINIC | Age: 62
End: 2017-12-15

## 2017-12-15 NOTE — TELEPHONE ENCOUNTER
Called Pt and got him scheduled for next Friday. I called Isabelle Coulter as well in ICU and advised her as well.

## 2017-12-15 NOTE — TELEPHONE ENCOUNTER
Jacklyn March from [facility] IC calling pt will be discharged today. Do you want to see him before he leaves or order any meds. Dr Juan Antonio Orellana hospitalist thought he had suicidal ideations so she admitted him.

## 2017-12-15 NOTE — TELEPHONE ENCOUNTER
Evaluated patient in the ICU this afternoon. Would like to have him scheduled to see me in about 2 weeks. Can we get him on my schedule - we can use a 9:30, 11:30 or 12:00 time slot. Can you let Jose Stahl in the ICU know of the appointment date/time if we can schedule him. Thank you.

## 2017-12-16 ENCOUNTER — CARE COORDINATION (OUTPATIENT)
Dept: CASE MANAGEMENT | Age: 62
End: 2017-12-16

## 2017-12-16 RX ORDER — CARBAMAZEPINE 200 MG/1
200 TABLET ORAL 3 TIMES DAILY
COMMUNITY
End: 2019-09-16 | Stop reason: ALTCHOICE

## 2017-12-16 NOTE — CARE COORDINATION
[facility] Transitions Initial Follow Up Call    Call within 2 business days of discharge: Yes    Patient: Олег Rascon Patient : 1955   MRN: <[id number]>  Reason for Admission: Delirium Tremens, ETOH withdrawl  Discharge Date: 12/15/17 RARS: [facility] Risk Score: 11.5     Spoke with: patient Jaquan Mcmahon and wife    Facility: [facility]    Non-face-to-face services provided:  Scheduled appointment with PCP-Dr. Rodrigo Awad  at [address] and reviewed discharge summary and/or continuity of care documents    Care Transitions 24 Hour Call    Schedule Follow Up Appointment with PCP:  Completed  Do you have any ongoing symptoms?:  No  Do you have a copy of your discharge instructions?:  Yes  Do you have all of your prescriptions and are they filled?:  Yes  Have you been contacted by a [facility] Pharmacist?:  No  Have you scheduled your follow up appointment?:  Yes  Were you discharged with any Home Care or Post Acute Services:  No  Do you have support at home?:  Partner/Spouse/SO  Do you feel like you have everything you need to keep you well at home?:  Yes  Are you an active caregiver in your home?:  No  Care Transitions Interventions     Patient reports he's feeling good states he slept like a baby. No issues or concerns. Wife then got on phone and we reviewed medications. Stated he has never taken Toradol, Omeprazole, Imitrex, or Topamax which were on his list of meds to cont. States he does take Epitol 200mg prn for muscle spasms which is not on the list.  She has the new medications and is aware of the medications to be discontinued. Said she feels like they need to see the MD to go over all of his meds, was able to schedule appt for Monday at 10am.  She said he's doing good. Mental status back to normal, no hallucinations/delusions. Agreeable to f/u calls.      Follow Up  Future Appointments  Date Time Provider John Lilly   2017 10:00 [facility]   [facility] 9:30

## 2017-12-18 ENCOUNTER — OFFICE VISIT (OUTPATIENT)
Dept: INTERNAL MEDICINE CLINIC | Age: 62
End: 2017-12-18

## 2017-12-18 VITALS
BODY MASS INDEX: 23.06 KG/M2 | WEIGHT: 138.4 LBS | SYSTOLIC BLOOD PRESSURE: 132 MMHG | HEIGHT: 65 IN | HEART RATE: 64 BPM | DIASTOLIC BLOOD PRESSURE: 92 MMHG

## 2017-12-18 DIAGNOSIS — J43.2 CENTRILOBULAR EMPHYSEMA (HCC): Chronic | ICD-10-CM

## 2017-12-18 DIAGNOSIS — G93.40 ACUTE ENCEPHALOPATHY: ICD-10-CM

## 2017-12-18 DIAGNOSIS — F17.210 SMOKING GREATER THAN 30 PACK YEARS: ICD-10-CM

## 2017-12-18 DIAGNOSIS — F10.20 ALCOHOL USE DISORDER, SEVERE, DEPENDENCE (HCC): Chronic | ICD-10-CM

## 2017-12-18 DIAGNOSIS — F10.20 ALCOHOL USE DISORDER, SEVERE, DEPENDENCE (HCC): Primary | Chronic | ICD-10-CM

## 2017-12-18 DIAGNOSIS — E87.1 HYPONATREMIA: ICD-10-CM

## 2017-12-18 DIAGNOSIS — M17.12 PRIMARY OSTEOARTHRITIS OF LEFT KNEE: ICD-10-CM

## 2017-12-18 LAB
A/G RATIO: 2.3 (ref 1.1–2.2)
ALBUMIN SERPL-MCNC: 4.5 G/DL (ref 3.4–5)
ALP BLD-CCNC: 69 U/L (ref 40–129)
ALT SERPL-CCNC: 37 U/L (ref 10–40)
ANION GAP SERPL CALCULATED.3IONS-SCNC: 18 MMOL/L (ref 3–16)
AST SERPL-CCNC: 34 U/L (ref 15–37)
BILIRUB SERPL-MCNC: <0.2 MG/DL (ref 0–1)
BUN BLDV-MCNC: 15 MG/DL (ref 7–20)
CALCIUM SERPL-MCNC: 9.3 MG/DL (ref 8.3–10.6)
CHLORIDE BLD-SCNC: 93 MMOL/L (ref 99–110)
CO2: 21 MMOL/L (ref 21–32)
CREAT SERPL-MCNC: 0.8 MG/DL (ref 0.8–1.3)
GFR AFRICAN AMERICAN: >60
GFR NON-AFRICAN AMERICAN: >60
GLOBULIN: 2 G/DL
GLUCOSE BLD-MCNC: 72 MG/DL (ref 70–99)
LIPASE: 88 U/L (ref 13–60)
POTASSIUM SERPL-SCNC: 4.4 MMOL/L (ref 3.5–5.1)
SODIUM BLD-SCNC: 132 MMOL/L (ref 136–145)
TOTAL PROTEIN: 6.5 G/DL (ref 6.4–8.2)

## 2017-12-18 PROCEDURE — 99495 TRANSJ CARE MGMT MOD F2F 14D: CPT | Performed by: INTERNAL MEDICINE

## 2017-12-18 PROCEDURE — 1111F DSCHRG MED/CURRENT MED MERGE: CPT | Performed by: INTERNAL MEDICINE

## 2017-12-18 RX ORDER — PREDNISONE 20 MG/1
20 TABLET ORAL DAILY
Qty: 7 TABLET | Refills: 0 | Status: SHIPPED | OUTPATIENT
Start: 2017-12-18 | End: 2017-12-25

## 2017-12-18 NOTE — PROGRESS NOTES
capsule  Take 1 capsule by mouth 3 times daily as needed for Anxiety . fluticasone-vilanterol (BREO ELLIPTA) 100-25 MCG/INH AEPB inhaler  INHALE ONE DOSE BY MOUTH DAILY             ketorolac (TORADOL) 60 MG/2ML injection  Inject 2 mLs into the muscle once for 1 dose             lisinopril (PRINIVIL;ZESTRIL) 20 MG tablet  TAKE ONE TABLET BY MOUTH DAILY             omeprazole (PRILOSEC) 20 MG delayed release capsule  Take 1 capsule by mouth daily (with breakfast)             SUMAtriptan (IMITREX) 50 MG tablet  Take every 4 hours PRN headache. Do not exceed 4 in a 24 hours period. topiramate (TOPAMAX) 50 MG tablet  Take 1 tablet by mouth 2 times daily                   Medications marked \"taking\" at this time  No outpatient prescriptions have been marked as taking for the 12/18/17 encounter (Appointment) with Tavon Miller MD.        Medications patient taking as of now reconciled against medications ordered at time of hospital discharge   Current Outpatient Prescriptions:     predniSONE (DELTASONE) 20 MG tablet, Take 1 tablet by mouth daily for 7 days, Disp: 7 tablet, Rfl: 0    carBAMazepine (TEGRETOL) 200 MG tablet, Take 200 mg by mouth 3 times daily, Disp: , Rfl:     amLODIPine (NORVASC) 5 MG tablet, Take 1 tablet by mouth daily, Disp: 30 tablet, Rfl: 3    chlordiazePOXIDE (LIBRIUM) 25 MG capsule, Take 1 capsule by mouth 3 times daily as needed for Anxiety . , Disp: 9 capsule, Rfl: 0    atenolol (TENORMIN) 100 MG tablet, Take 1 tablet by mouth daily, Disp: 90 tablet, Rfl: 3    albuterol sulfate HFA (PROAIR HFA) 108 (90 Base) MCG/ACT inhaler, Inhale 2 puffs into the lungs every 6 hours as needed for Wheezing, Disp: 1 Inhaler, Rfl: 3    omeprazole (PRILOSEC) 20 MG delayed release capsule, Take 1 capsule by mouth daily (with breakfast), Disp: 30 capsule, Rfl: 3    lisinopril (PRINIVIL;ZESTRIL) 20 MG tablet, TAKE ONE TABLET BY MOUTH DAILY, Disp: 90 tablet, Rfl: 3   fluticasone-vilanterol (BREO ELLIPTA) 100-25 MCG/INH AEPB inhaler, INHALE ONE DOSE BY MOUTH DAILY, Disp: 1 each, Rfl: 5    SUMAtriptan (IMITREX) 50 MG tablet, Take every 4 hours PRN headache. Do not exceed 4 in a 24 hours period. , Disp: 9 tablet, Rfl: 1      Vitals:    12/18/17 1000 12/18/17 1017   BP: (!) 142/90 (!) 132/92   Pulse: 64    Weight: 138 lb 6.4 oz (62.8 kg)    Height: 5' 5\" (1.651 m)      There is no height or weight on file to calculate BMI. Wt Readings from Last 3 Encounters:   12/15/17 130 lb 6.4 oz (59.1 kg)   12/05/17 134 lb (60.8 kg)   10/25/17 138 lb (62.6 kg)     BP Readings from Last 3 Encounters:   12/15/17 122/71   12/05/17 (!) 142/80   10/25/17 136/88        Inpatient course: Discharge summary reviewed- see chart.     Chief Complaint   Patient presents with   [address] from [facility]     d/c 12/15/17, has been feeling pretty good     History of Present illness - Follow up of Hospital diagnosis(es):   Patient Active Problem List    Diagnosis Date Noted    Delirium tremens ([address] 12/11/2017    Continuous visual hallucinations 12/11/2017    Hyponatremia 12/11/2017    Acute encephalopathy 12/11/2017    Anemia 36/85/5574    Alcoholic liver disease ([address] 12/11/2017    Delirium due to known physiological condition     Essential hypertension     Arthritis of carpometacarpal (CMC) joint of left thumb 09/14/2017    Trauma and stressor-related disorder 07/10/2017    Moderate episode of recurrent major depressive disorder ([address] 07/10/2017    Arthritis of left hip 06/22/2017    Recurrent left inguinal hernia     Pulmonary emphysema ([address] 03/29/2016    Recurrent inguinal hernia 04/21/2015    DDD (degenerative disc disease), lumbosacral 12/15/2014    Alcohol use disorder, severe, dependence ([address] 06/09/2014    Migraine without status migrainosus, not intractable 10/09/2013    Sacroiliac inflammation ([facility] 06/06/2013    Tobacco abuse disorder 02/21/2012    Hypertension 06/06/2011 Non face to face  following discharge, date last encounter closed (first attempt may have been earlier): 12/16/2017 11:01 AM    Call initiated 2 business days of discharge: Yes     Interval history/Current status: Improved considerably    Physical Exam:  General Appearance: alert and oriented to person, place and time, well-developed and well-nourished, in no acute distress  Skin: warm and dry, no rash or erythema and ecchymoses noted on arms  Head: normocephalic and atraumatic  Eyes: pupils equal, round, and reactive to light, extraocular eye movements intact, conjunctivae normal  Pulmonary/Chest: clear to auscultation bilaterally- no wheezes, rales or rhonchi, normal air movement, no respiratory distress  Cardiovascular: normal rate, normal S1 and S2, no gallops, intact distal pulses and no carotid bruits  Abdomen: soft, non-tender, non-distended, normal bowel sounds, no masses or organomegaly  Extremities: no cyanosis and no clubbing  Musculoskeletal: normal range of motion, no joint swelling, deformity or tenderness  Neurologic: gait and coordination normal and speech normal        Assessment/Plan:  There are no diagnoses linked to this encounter.       Medical Decision Making: moderate complexity         Post-Discharge Transitional Care Management Services      Chinoar Ira   YOB: 1955    Date of Office Visit:  12/18/2017  Date of Hospital Admission: 12/11/17  Date of Hospital Discharge: 12/15/17  [facility] Risk Score [risk of hospital readmission >=10  medium risk (chance of readmission ~ 12%) >14  high risk (chance of readmission ~18%)]:Risk Score: 11.5    Care management risk score Rising risk (score 2-5) and Complex Care (Scores >=6): 5       Patient Active Problem List   Diagnosis    Hypertension    Tobacco abuse disorder    Sacroiliac inflammation ([address]    Alcohol use disorder, severe, dependence ([address]    DDD (degenerative disc disease), lumbosacral    Recurrent inguinal hernia    Pulmonary emphysema (HCC)    Migraine without status migrainosus, not intractable    Recurrent left inguinal hernia    Arthritis of left hip    Trauma and stressor-related disorder    Moderate episode of recurrent major depressive disorder (HCC)    Arthritis of carpometacarpal (CMC) joint of left thumb    Essential hypertension    Delirium tremens ([address]    Continuous visual hallucinations    Hyponatremia    Acute encephalopathy    Anemia    Alcoholic liver disease (HCC)    Delirium due to known physiological condition       Allergies   Allergen Reactions    Propoxyphene        Medications listed as ordered at the time of discharge from [facility] Medication Instructions VALERIANO:    Printed on:12/18/17 0301   Medication Information                      albuterol sulfate HFA (PROAIR HFA) 108 (90 Base) MCG/ACT inhaler  Inhale 2 puffs into the lungs every 6 hours as needed for Wheezing             amLODIPine (NORVASC) 5 MG tablet  Take 1 tablet by mouth daily             atenolol (TENORMIN) 100 MG tablet  Take 1 tablet by mouth daily             carBAMazepine (TEGRETOL) 200 MG tablet  Take 200 mg by mouth 3 times daily             chlordiazePOXIDE (LIBRIUM) 25 MG capsule  Take 1 capsule by mouth 3 times daily as needed for Anxiety . fluticasone-vilanterol (BREO ELLIPTA) 100-25 MCG/INH AEPB inhaler  INHALE ONE DOSE BY MOUTH DAILY             lisinopril (PRINIVIL;ZESTRIL) 20 MG tablet  TAKE ONE TABLET BY MOUTH DAILY             omeprazole (PRILOSEC) 20 MG delayed release capsule  Take 1 capsule by mouth daily (with breakfast)             predniSONE (DELTASONE) 20 MG tablet  Take 1 tablet by mouth daily for 7 days             SUMAtriptan (IMITREX) 50 MG tablet  Take every 4 hours PRN headache. Do not exceed 4 in a 24 hours period.                    Medications marked \"taking\" at this time  Outpatient Prescriptions Marked as Taking for the 12/18/17 encounter (Office history/Current status: See above      Physical Exam        Assessment/Plan:  Neena Fer was seen today for follow-up from hospital.    Diagnoses and all orders for this visit:    Alcohol use disorder, severe, dependence ([address]  -     LIPASE; Future    Centrilobular emphysema ([address]  -     CT Lung Screening; Future    Hyponatremia  -     Comprehensive Metabolic Panel    Acute encephalopathy    Primary osteoarthritis of left knee  -     predniSONE (DELTASONE) 20 MG tablet; Take 1 tablet by mouth daily for 7 days    Smoking greater than 30 pack years  -     CT Lung Screening;  Future          Medical Decision Making: moderate complexity

## 2017-12-19 DIAGNOSIS — K85.20 ALCOHOL-INDUCED ACUTE PANCREATITIS, UNSPECIFIED COMPLICATION STATUS: Primary | ICD-10-CM

## 2017-12-22 ENCOUNTER — OFFICE VISIT (OUTPATIENT)
Dept: PSYCHIATRY | Age: 62
End: 2017-12-22

## 2017-12-22 ENCOUNTER — CARE COORDINATION (OUTPATIENT)
Dept: CASE MANAGEMENT | Age: 62
End: 2017-12-22

## 2017-12-22 VITALS
HEART RATE: 78 BPM | BODY MASS INDEX: 23.3 KG/M2 | WEIGHT: 140 LBS | SYSTOLIC BLOOD PRESSURE: 142 MMHG | DIASTOLIC BLOOD PRESSURE: 90 MMHG

## 2017-12-22 DIAGNOSIS — F43.9 TRAUMA AND STRESSOR-RELATED DISORDER: ICD-10-CM

## 2017-12-22 DIAGNOSIS — F41.1 GENERALIZED ANXIETY DISORDER: ICD-10-CM

## 2017-12-22 DIAGNOSIS — E87.1 HYPONATREMIA: ICD-10-CM

## 2017-12-22 DIAGNOSIS — E87.1 LOW SODIUM LEVELS: Primary | ICD-10-CM

## 2017-12-22 DIAGNOSIS — F33.1 MODERATE EPISODE OF RECURRENT MAJOR DEPRESSIVE DISORDER (HCC): Primary | Chronic | ICD-10-CM

## 2017-12-22 LAB
ANION GAP SERPL CALCULATED.3IONS-SCNC: 14 MMOL/L (ref 3–16)
BUN BLDV-MCNC: 8 MG/DL (ref 7–20)
CALCIUM SERPL-MCNC: 9.2 MG/DL (ref 8.3–10.6)
CHLORIDE BLD-SCNC: 103 MMOL/L (ref 99–110)
CO2: 25 MMOL/L (ref 21–32)
CREAT SERPL-MCNC: 0.7 MG/DL (ref 0.8–1.3)
GFR AFRICAN AMERICAN: >60
GFR NON-AFRICAN AMERICAN: >60
GLUCOSE BLD-MCNC: 86 MG/DL (ref 70–99)
POTASSIUM SERPL-SCNC: 4.4 MMOL/L (ref 3.5–5.1)
SODIUM BLD-SCNC: 142 MMOL/L (ref 136–145)

## 2017-12-22 PROCEDURE — 99214 OFFICE O/P EST MOD 30 MIN: CPT | Performed by: PSYCHIATRY & NEUROLOGY
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
## 2017-12-22 NOTE — PROGRESS NOTES
I think it probably is the ETOH. But I'm still being cautious about the zoloft, as the onset seemed to coincide with starting this as well.

## 2017-12-26 ENCOUNTER — TELEPHONE (OUTPATIENT)
Dept: PAIN MANAGEMENT | Age: 62
End: 2017-12-26

## 2017-12-26 NOTE — TELEPHONE ENCOUNTER
This patients referral has been approved for scheduling with [facility]. First message was left for the patient to call [facility] regarding their referral.  He states he does not want to schedule at this time, he is about to see a different doctor and wants to see what they say first. He is aware we will make only 2 more attempts to contact him.

## 2017-12-26 NOTE — LETTER
01/02/18    [facility]   Dr. Everton Zhou, [address]   P: [phone]  F: [phone]    Re: Neelam Ragsdale 1955    To Whom it May Concern: Thank you for your referral to [facility] Pain Management. Listed below, you will find the outcome of your referral:    · He reported that being seen in our office is not necessary for his care; therefore, his referral has been canceled. If you or your patient wish to reconsider being seen in our office in the future, please send a new referral. Previously canceled referrals may not be re-submitted. Please feel free to contact our office with any questions.       Thank you,     Pain Management Staff

## 2017-12-28 ENCOUNTER — CARE COORDINATION (OUTPATIENT)
Dept: CASE MANAGEMENT | Age: 62
End: 2017-12-28

## 2017-12-28 NOTE — CARE COORDINATION
[facility] Transitions Follow Up Call    2017    Patient: Rylee Garcia  Patient : 1955   MRN: [id number]  Reason for Admission:   Discharge Date: 12/15/17 RARS: Risk Score: 11.5     Spoke with: Jorge Morejon    Non-face-to-face services provided:  Obtained and reviewed discharge summary and/or continuity of care documents    Inpatient Assessment  Care Transitions Subsequent and Final Call    Subsequent and Final Calls  Do you have any ongoing symptoms?:  Yes  Onset of Patient-reported symptoms: In the past 7 days  Patient-reported symptoms:  Abdominal Pain  Have your medications changed?:  No  Do you have any questions related to your medications?:  No  Do you currently have any active services?:  No  Do you have any needs or concerns that I can assist you with?:  No  Identified Barriers:  Fear of Failure  Care Transitions Interventions  No Identified Needs  Other Interventions:          Spoke with Mr. Kobi Garcia today, he was coherent, able to articulate thoughts and ideas well. States he has not had any alcohol since his discharge from [facility]. Reports he is staying busy with housework and other chores and is avoiding all of his triggers and locations and friends right now that he used to drink with. CTC encouraged him to continue to take this action and praised his effort to do so. He is motivated to continue, but expresses fear of failure. No episodes of DTs since discharge. He is still having pain to upper left flank and left hip. His CT scan is scheduled for tomorrow. He did attend Hospital follow up on 1218 with PCP and  with psychiatry. Today is the final transition call.    Follow Up  Future Appointments  Date Time Provider John Lilly   2017 5:00 PM F CT RM 1 F CT Erath Ra   2018 11:30 AM Sky Burger MD [facility]   2018 10:30 AM Anai Caldwell MD [facility]       Danielle Mancia, RN   Care Transition Coordinator  [phone]

## 2017-12-29 ENCOUNTER — HOSPITAL ENCOUNTER (OUTPATIENT)
Dept: CT IMAGING | Age: 62
Discharge: HOME | End: 2017-12-29
Attending: INTERNAL MEDICINE | Admitting: INTERNAL MEDICINE

## 2017-12-29 DIAGNOSIS — K85.20 ALCOHOL INDUCED ACUTE PANCREATITIS: ICD-10-CM

## 2017-12-29 DIAGNOSIS — K85.20 ALCOHOL-INDUCED ACUTE PANCREATITIS, UNSPECIFIED COMPLICATION STATUS: ICD-10-CM

## 2018-01-04 RX ORDER — MIRTAZAPINE 15 MG/1
15 TABLET, FILM COATED ORAL NIGHTLY
Qty: 30 TABLET | Refills: 0 | Status: SHIPPED | OUTPATIENT
Start: 2018-01-04 | End: 2018-01-26 | Stop reason: SDUPTHER

## 2018-01-26 ENCOUNTER — OFFICE VISIT (OUTPATIENT)
Dept: PSYCHIATRY | Age: 63
End: 2018-01-26

## 2018-01-26 VITALS
DIASTOLIC BLOOD PRESSURE: 82 MMHG | HEART RATE: 89 BPM | HEIGHT: 65 IN | OXYGEN SATURATION: 86 % | SYSTOLIC BLOOD PRESSURE: 128 MMHG | WEIGHT: 144 LBS | BODY MASS INDEX: 23.99 KG/M2

## 2018-01-26 DIAGNOSIS — F33.1 MODERATE EPISODE OF RECURRENT MAJOR DEPRESSIVE DISORDER (HCC): Primary | ICD-10-CM

## 2018-01-26 DIAGNOSIS — F10.21 ALCOHOL USE DISORDER, SEVERE, IN EARLY REMISSION (HCC): ICD-10-CM

## 2018-01-26 DIAGNOSIS — Z72.0 TOBACCO ABUSE DISORDER: Chronic | ICD-10-CM

## 2018-01-26 DIAGNOSIS — F41.1 GENERALIZED ANXIETY DISORDER: ICD-10-CM

## 2018-01-26 DIAGNOSIS — F43.9 TRAUMA AND STRESSOR-RELATED DISORDER: ICD-10-CM

## 2018-01-26 PROCEDURE — 99214 OFFICE O/P EST MOD 30 MIN: CPT | Performed by: PSYCHIATRY & NEUROLOGY

## 2018-01-26 RX ORDER — MIRTAZAPINE 30 MG/1
30 TABLET, FILM COATED ORAL NIGHTLY
Qty: 30 TABLET | Refills: 1 | Status: SHIPPED | OUTPATIENT
Start: 2018-01-26 | End: 2018-04-17 | Stop reason: SDUPTHER

## 2018-01-29 ASSESSMENT — PATIENT HEALTH QUESTIONNAIRE - PHQ9
5. POOR APPETITE OR OVEREATING: 0
1. LITTLE INTEREST OR PLEASURE IN DOING THINGS: 3
6. FEELING BAD ABOUT YOURSELF - OR THAT YOU ARE A FAILURE OR HAVE LET YOURSELF OR YOUR FAMILY DOWN: 0
7. TROUBLE CONCENTRATING ON THINGS, SUCH AS READING THE NEWSPAPER OR WATCHING TELEVISION: 0
SUM OF ALL RESPONSES TO PHQ QUESTIONS 1-9: 12
9. THOUGHTS THAT YOU WOULD BE BETTER OFF DEAD, OR OF HURTING YOURSELF: 0
4. FEELING TIRED OR HAVING LITTLE ENERGY: 3
2. FEELING DOWN, DEPRESSED OR HOPELESS: 2
SUM OF ALL RESPONSES TO PHQ9 QUESTIONS 1 & 2: 5
3. TROUBLE FALLING OR STAYING ASLEEP: 1
10. IF YOU CHECKED OFF ANY PROBLEMS, HOW DIFFICULT HAVE THESE PROBLEMS MADE IT FOR YOU TO DO YOUR WORK, TAKE CARE OF THINGS AT HOME, OR GET ALONG WITH OTHER PEOPLE: 2
8. MOVING OR SPEAKING SO SLOWLY THAT OTHER PEOPLE COULD HAVE NOTICED. OR THE OPPOSITE, BEING SO FIGETY OR RESTLESS THAT YOU HAVE BEEN MOVING AROUND A LOT MORE THAN USUAL: 3

## 2018-01-29 ASSESSMENT — ANXIETY QUESTIONNAIRES
2. NOT BEING ABLE TO STOP OR CONTROL WORRYING: 3-NEARLY EVERY DAY
5. BEING SO RESTLESS THAT IT IS HARD TO SIT STILL: 3-NEARLY EVERY DAY
7. FEELING AFRAID AS IF SOMETHING AWFUL MIGHT HAPPEN: 3-NEARLY EVERY DAY
1. FEELING NERVOUS, ANXIOUS, OR ON EDGE: 3-NEARLY EVERY DAY
6. BECOMING EASILY ANNOYED OR IRRITABLE: 3-NEARLY EVERY DAY
GAD7 TOTAL SCORE: 21
3. WORRYING TOO MUCH ABOUT DIFFERENT THINGS: 3-NEARLY EVERY DAY
4. TROUBLE RELAXING: 3-NEARLY EVERY DAY
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
## 2018-02-28 ENCOUNTER — OFFICE VISIT (OUTPATIENT)
Dept: INTERNAL MEDICINE CLINIC | Age: 63
End: 2018-02-28

## 2018-02-28 VITALS
BODY MASS INDEX: 24.3 KG/M2 | DIASTOLIC BLOOD PRESSURE: 82 MMHG | SYSTOLIC BLOOD PRESSURE: 128 MMHG | WEIGHT: 146 LBS | HEART RATE: 72 BPM

## 2018-02-28 DIAGNOSIS — I10 ESSENTIAL HYPERTENSION: ICD-10-CM

## 2018-02-28 DIAGNOSIS — Z72.0 TOBACCO ABUSE DISORDER: Chronic | ICD-10-CM

## 2018-02-28 DIAGNOSIS — Z12.11 COLON CANCER SCREENING: ICD-10-CM

## 2018-02-28 DIAGNOSIS — F10.21 ALCOHOL USE DISORDER, SEVERE, IN EARLY REMISSION (HCC): ICD-10-CM

## 2018-02-28 DIAGNOSIS — J43.2 CENTRILOBULAR EMPHYSEMA (HCC): Primary | Chronic | ICD-10-CM

## 2018-02-28 DIAGNOSIS — M70.62 TROCHANTERIC BURSITIS OF LEFT HIP: ICD-10-CM

## 2018-02-28 DIAGNOSIS — F33.1 MODERATE EPISODE OF RECURRENT MAJOR DEPRESSIVE DISORDER (HCC): Chronic | ICD-10-CM

## 2018-02-28 DIAGNOSIS — M46.1 SACROILIAC INFLAMMATION (HCC): ICD-10-CM

## 2018-02-28 PROBLEM — F10.931 DELIRIUM TREMENS (HCC): Status: RESOLVED | Noted: 2017-12-11 | Resolved: 2018-02-28

## 2018-02-28 PROBLEM — F43.9 TRAUMA AND STRESSOR-RELATED DISORDER: Status: RESOLVED | Noted: 2017-07-10 | Resolved: 2018-02-28

## 2018-02-28 PROBLEM — M18.12 ARTHRITIS OF CARPOMETACARPAL (CMC) JOINT OF LEFT THUMB: Status: RESOLVED | Noted: 2017-09-14 | Resolved: 2018-02-28

## 2018-02-28 PROBLEM — M16.12 ARTHRITIS OF LEFT HIP: Status: RESOLVED | Noted: 2017-06-22 | Resolved: 2018-02-28

## 2018-02-28 PROBLEM — G93.40 ACUTE ENCEPHALOPATHY: Status: RESOLVED | Noted: 2017-12-11 | Resolved: 2018-02-28

## 2018-02-28 PROBLEM — E87.1 HYPONATREMIA: Status: RESOLVED | Noted: 2017-12-11 | Resolved: 2018-02-28

## 2018-02-28 PROBLEM — R44.1: Status: RESOLVED | Noted: 2017-12-11 | Resolved: 2018-02-28

## 2018-02-28 PROBLEM — D64.9 ANEMIA: Status: RESOLVED | Noted: 2017-12-11 | Resolved: 2018-02-28

## 2018-02-28 PROBLEM — K70.9 ALCOHOLIC LIVER DISEASE (HCC): Status: RESOLVED | Noted: 2017-12-11 | Resolved: 2018-02-28

## 2018-02-28 LAB
CHOLESTEROL, TOTAL: 200 MG/DL (ref 0–199)
HDLC SERPL-MCNC: 63 MG/DL (ref 40–60)
LDL CHOLESTEROL CALCULATED: 111 MG/DL
TRIGL SERPL-MCNC: 128 MG/DL (ref 0–150)
VLDLC SERPL CALC-MCNC: 26 MG/DL

## 2018-02-28 PROCEDURE — G8484 FLU IMMUNIZE NO ADMIN: HCPCS | Performed by: INTERNAL MEDICINE

## 2018-02-28 PROCEDURE — 3023F SPIROM DOC REV: CPT | Performed by: INTERNAL MEDICINE

## 2018-02-28 PROCEDURE — 3017F COLORECTAL CA SCREEN DOC REV: CPT | Performed by: INTERNAL MEDICINE

## 2018-02-28 PROCEDURE — G8926 SPIRO NO PERF OR DOC: HCPCS | Performed by: INTERNAL MEDICINE

## 2018-02-28 PROCEDURE — 4004F PT TOBACCO SCREEN RCVD TLK: CPT | Performed by: INTERNAL MEDICINE

## 2018-02-28 PROCEDURE — G8427 DOCREV CUR MEDS BY ELIG CLIN: HCPCS | Performed by: INTERNAL MEDICINE

## 2018-02-28 PROCEDURE — G9016 DEMO-SMOKING CESSATION COUN: HCPCS | Performed by: INTERNAL MEDICINE

## 2018-02-28 PROCEDURE — 99214 OFFICE O/P EST MOD 30 MIN: CPT | Performed by: INTERNAL MEDICINE

## 2018-02-28 PROCEDURE — G8420 CALC BMI NORM PARAMETERS: HCPCS | Performed by: INTERNAL MEDICINE

## 2018-02-28 RX ORDER — PREDNISONE 20 MG/1
20 TABLET ORAL DAILY
Qty: 7 TABLET | Refills: 0 | Status: SHIPPED | OUTPATIENT
Start: 2018-02-28 | End: 2018-03-07

## 2018-02-28 NOTE — PROGRESS NOTES
carBAMazepine (TEGRETOL) 200 MG tablet, Take 200 mg by mouth 3 times daily, Disp: , Rfl:     amLODIPine (NORVASC) 5 MG tablet, Take 1 tablet by mouth daily, Disp: 30 tablet, Rfl: 3    atenolol (TENORMIN) 100 MG tablet, Take 1 tablet by mouth daily, Disp: 90 tablet, Rfl: 3    albuterol sulfate HFA (PROAIR HFA) 108 (90 Base) MCG/ACT inhaler, Inhale 2 puffs into the lungs every 6 hours as needed for Wheezing, Disp: 1 Inhaler, Rfl: 3    omeprazole (PRILOSEC) 20 MG delayed release capsule, Take 1 capsule by mouth daily (with breakfast), Disp: 30 capsule, Rfl: 3    lisinopril (PRINIVIL;ZESTRIL) 20 MG tablet, TAKE ONE TABLET BY MOUTH DAILY, Disp: 90 tablet, Rfl: 3    fluticasone-vilanterol (BREO ELLIPTA) 100-25 MCG/INH AEPB inhaler, INHALE ONE DOSE BY MOUTH DAILY, Disp: 1 each, Rfl: 5    SUMAtriptan (IMITREX) 50 MG tablet, Take every 4 hours PRN headache. Do not exceed 4 in a 24 hours period. , Disp: 9 tablet, Rfl: 1    Assessment/Plan:  Rossi Check was seen today for hypertension and mass. Diagnoses and all orders for this visit:    Centrilobular emphysema ([facility] [address]  Comments:  Chronic, controlled. Moderate episode of recurrent major depressive disorder ([address]  Comments:  Much better since he stopped consuming alcohol. Sacroiliac inflammation (HCC)  -     predniSONE (DELTASONE) 20 MG tablet; Take 1 tablet by mouth daily for 7 days    Alcohol use disorder, severe, in early remission ([address]  Comments:  He has not had a drink for long time. Essential hypertension  -     Lipid Panel    Tobacco abuse disorder  -     [place] DEMO-SMOKING CESSATION COUN    Colon cancer screening  -     COLONOSCOPY (Screening)  -     Yessy Kiran MD ([facility])    Trochanteric bursitis of left hip  -     predniSONE (DELTASONE) 20 MG tablet;  Take 1 tablet by mouth daily for 7 days        Jacques Frazier MD

## 2018-04-17 RX ORDER — MIRTAZAPINE 30 MG/1
TABLET, FILM COATED ORAL
Qty: 30 TABLET | Refills: 0 | Status: ON HOLD | OUTPATIENT
Start: 2018-04-17 | End: 2019-09-18 | Stop reason: ALTCHOICE

## 2018-04-25 ENCOUNTER — HOSPITAL ENCOUNTER (OUTPATIENT)
Dept: ENDOSCOPY | Age: 63
Discharge: HOME | End: 2018-04-25
Attending: INTERNAL MEDICINE | Admitting: INTERNAL MEDICINE

## 2018-10-19 DIAGNOSIS — J43.1 PANLOBULAR EMPHYSEMA (HCC): Chronic | ICD-10-CM

## 2019-08-08 ENCOUNTER — TELEPHONE (OUTPATIENT)
Dept: INTERNAL MEDICINE CLINIC | Age: 64
End: 2019-08-08

## 2019-08-28 ENCOUNTER — INITIAL CONSULT (OUTPATIENT)
Dept: SURGERY | Age: 64
End: 2019-08-28
Payer: MEDICARE

## 2019-08-28 VITALS
WEIGHT: 137 LBS | SYSTOLIC BLOOD PRESSURE: 175 MMHG | HEIGHT: 66 IN | DIASTOLIC BLOOD PRESSURE: 100 MMHG | BODY MASS INDEX: 22.02 KG/M2

## 2019-08-28 DIAGNOSIS — K40.91 UNILATERAL RECURRENT INGUINAL HERNIA WITHOUT OBSTRUCTION OR GANGRENE: Primary | ICD-10-CM

## 2019-08-28 PROCEDURE — 4004F PT TOBACCO SCREEN RCVD TLK: CPT | Performed by: SURGERY

## 2019-08-28 PROCEDURE — 1123F ACP DISCUSS/DSCN MKR DOCD: CPT | Performed by: SURGERY

## 2019-08-28 PROCEDURE — G8420 CALC BMI NORM PARAMETERS: HCPCS | Performed by: SURGERY

## 2019-08-28 PROCEDURE — 99212 OFFICE O/P EST SF 10 MIN: CPT | Performed by: SURGERY

## 2019-08-28 PROCEDURE — G8427 DOCREV CUR MEDS BY ELIG CLIN: HCPCS | Performed by: SURGERY

## 2019-08-28 PROCEDURE — 3017F COLORECTAL CA SCREEN DOC REV: CPT | Performed by: SURGERY

## 2019-08-28 PROCEDURE — 4040F PNEUMOC VAC/ADMIN/RCVD: CPT | Performed by: SURGERY

## 2019-08-28 ASSESSMENT — ENCOUNTER SYMPTOMS
ABDOMINAL PAIN: 1
EYES NEGATIVE: 1
BACK PAIN: 1
ALLERGIC/IMMUNOLOGIC NEGATIVE: 1
RESPIRATORY NEGATIVE: 1
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
## 2019-09-16 NOTE — PROGRESS NOTES
Name_______________________________________Printed:____________________  Date and time of [id number]_______________________Ckmhpdy Time:_1230 MAIN_______________   1. Do not eat or drink anything after 12 midnight (or____hours) prior to surgery. This includes no water, chewing gum or mints. Endoscopy patients follow your doctors bowel prep instructions,which may include taking part of prep after midnight. 2. Take the following pills with a small sip of water on the morning of surgery___ATENOLOL________________________________________________                  Do not take blood pressure medications ending in pril or sartan the shira prior to surgery or the morning of surgery_   3. Aspirin, Ibuprofen, Advil, Naproxen, Vitamin E and other Anti-inflammatory products should be stopped for 5 days before surgery or as directed by your physician. 4. Check with your Doctor regarding stopping Plavix, Coumadin,Eliquis, Lovenox,Effient,Pradaxa,Xarelto, Fragmin or other blood thinners and follow their instructions. 5. Do not smoke, and do not drink any alcoholic beverages 24 hours prior to surgery. This includes NA Beer. Refrain from the usage of any recreational drugs. 6. You may brush your teeth and gargle the morning of surgery. DO NOT SWALLOW WATER   7. You MUST make arrangements for a responsible adult to stay on site while you are here and take you home after your surgery. You will not be allowed to leave alone or drive yourself home. It is strongly suggested someone stay with you the first 24 hrs. Your surgery will be cancelled if you do not have a ride home. 8. A parent/legal guardian must accompany a child scheduled for surgery and plan to stay at the hospital until the child is discharged. Please do not bring other children with you.    9. Please wear simple, loose fitting clothing to the hospital.  Do not bring valuables (money, credit cards, checkbooks, etc.) Do not wear any makeup (including no eye your prescriptions. 24. If you use oxygen and have a portable tank please bring it  with you the DOS             25. Bring a complete list of all your medications with name and dose include any supplements. 26. Other__________________________________________   *Please call pre admission testing if you any further questions   [address] [place] [address]. [facility]  [phone]   [address] [place] [address]       All above information reviewed with patient in person or by phone. Patient verbalizes understanding. All questions and concerns addressed.                                                                                                  Patient/Rep___PT_________________                                                                                                                                    PRE OP INSTRUCTIONS

## 2019-09-18 ENCOUNTER — HOSPITAL ENCOUNTER (OUTPATIENT)
Age: 64
Setting detail: OUTPATIENT SURGERY
Discharge: HOME | End: 2019-09-18
Attending: SURGERY | Admitting: SURGERY
Payer: MEDICARE

## 2019-09-18 ENCOUNTER — ANESTHESIA (OUTPATIENT)
Dept: OPERATING ROOM | Age: 64
End: 2019-09-18
Payer: MEDICARE

## 2019-09-18 ENCOUNTER — ANESTHESIA EVENT (OUTPATIENT)
Dept: OPERATING ROOM | Age: 64
End: 2019-09-18
Payer: MEDICARE

## 2019-09-18 VITALS
BODY MASS INDEX: 21.71 KG/M2 | DIASTOLIC BLOOD PRESSURE: 86 MMHG | HEIGHT: 66 IN | TEMPERATURE: 97 F | OXYGEN SATURATION: 94 % | RESPIRATION RATE: 16 BRPM | SYSTOLIC BLOOD PRESSURE: 155 MMHG | HEART RATE: 83 BPM | WEIGHT: 135.1 LBS

## 2019-09-18 VITALS
DIASTOLIC BLOOD PRESSURE: 50 MMHG | SYSTOLIC BLOOD PRESSURE: 93 MMHG | OXYGEN SATURATION: 100 % | RESPIRATION RATE: 10 BRPM | TEMPERATURE: 98.4 F

## 2019-09-18 DIAGNOSIS — R07.9 CHEST PAIN, UNSPECIFIED TYPE: ICD-10-CM

## 2019-09-18 DIAGNOSIS — K40.91 UNILATERAL RECURRENT INGUINAL HERNIA WITHOUT OBSTRUCTION OR GANGRENE: Primary | ICD-10-CM

## 2019-09-18 LAB
A/G RATIO: 1.6 (ref 1.1–2.2)
ALBUMIN SERPL-MCNC: 4.5 G/DL (ref 3.4–5)
ALP BLD-CCNC: 74 U/L (ref 40–129)
ALT SERPL-CCNC: 20 U/L (ref 10–40)
ANION GAP SERPL CALCULATED.3IONS-SCNC: 12 MMOL/L (ref 3–16)
APTT: 32.5 SEC (ref 26–36)
AST SERPL-CCNC: 23 U/L (ref 15–37)
BILIRUB SERPL-MCNC: 1 MG/DL (ref 0–1)
BUN BLDV-MCNC: 6 MG/DL (ref 7–20)
CALCIUM SERPL-MCNC: 9.6 MG/DL (ref 8.3–10.6)
CHLORIDE BLD-SCNC: 95 MMOL/L (ref 99–110)
CO2: 28 MMOL/L (ref 21–32)
CREAT SERPL-MCNC: 0.7 MG/DL (ref 0.8–1.3)
GFR AFRICAN AMERICAN: >60
GFR NON-AFRICAN AMERICAN: >60
GLOBULIN: 2.9 G/DL
GLUCOSE BLD-MCNC: 98 MG/DL (ref 70–99)
HCT VFR BLD CALC: 44.4 % (ref 40.5–52.5)
HEMOGLOBIN: 15 G/DL (ref 13.5–17.5)
INR BLD: 0.95 (ref 0.86–1.14)
MCH RBC QN AUTO: 34 PG (ref 26–34)
MCHC RBC AUTO-ENTMCNC: 33.7 G/DL (ref 31–36)
MCV RBC AUTO: 100.8 FL (ref 80–100)
PDW BLD-RTO: 12.9 % (ref 12.4–15.4)
PLATELET # BLD: 172 K/UL (ref 135–450)
PMV BLD AUTO: 8.6 FL (ref 5–10.5)
POTASSIUM SERPL-SCNC: 4 MMOL/L (ref 3.5–5.1)
PROTHROMBIN TIME: 10.8 SEC (ref 9.8–13)
RBC # BLD: 4.41 M/UL (ref 4.2–5.9)
SODIUM BLD-SCNC: 135 MMOL/L (ref 136–145)
TOTAL PROTEIN: 7.4 G/DL (ref 6.4–8.2)
WBC # BLD: 7 K/UL (ref 4–11)

## 2019-09-18 PROCEDURE — 3600000002 HC SURGERY LEVEL 2 BASE: Performed by: SURGERY

## 2019-09-18 PROCEDURE — 3700000000 HC ANESTHESIA ATTENDED CARE: Performed by: SURGERY

## 2019-09-18 PROCEDURE — 7100000001 HC PACU RECOVERY - ADDTL 15 MIN: Performed by: SURGERY

## 2019-09-18 PROCEDURE — 7100000011 HC PHASE II RECOVERY - ADDTL 15 MIN: Performed by: SURGERY

## 2019-09-18 PROCEDURE — 85610 PROTHROMBIN TIME: CPT

## 2019-09-18 PROCEDURE — 2500000003 HC RX 250 WO HCPCS: Performed by: NURSE ANESTHETIST, CERTIFIED REGISTERED

## 2019-09-18 PROCEDURE — 6370000000 HC RX 637 (ALT 250 FOR IP)

## 2019-09-18 PROCEDURE — 6360000002 HC RX W HCPCS: Performed by: NURSE ANESTHETIST, CERTIFIED REGISTERED

## 2019-09-18 PROCEDURE — 2580000003 HC RX 258: Performed by: NURSE ANESTHETIST, CERTIFIED REGISTERED

## 2019-09-18 PROCEDURE — 3600000012 HC SURGERY LEVEL 2 ADDTL 15MIN: Performed by: SURGERY

## 2019-09-18 PROCEDURE — C1781 MESH (IMPLANTABLE): HCPCS | Performed by: SURGERY

## 2019-09-18 PROCEDURE — 7100000000 HC PACU RECOVERY - FIRST 15 MIN: Performed by: SURGERY

## 2019-09-18 PROCEDURE — 3700000001 HC ADD 15 MINUTES (ANESTHESIA): Performed by: SURGERY

## 2019-09-18 PROCEDURE — 6360000002 HC RX W HCPCS: Performed by: ANESTHESIOLOGY

## 2019-09-18 PROCEDURE — 2709999900 HC NON-CHARGEABLE SUPPLY: Performed by: SURGERY

## 2019-09-18 PROCEDURE — 36415 COLL VENOUS BLD VENIPUNCTURE: CPT

## 2019-09-18 PROCEDURE — 6360000002 HC RX W HCPCS: Performed by: SURGERY

## 2019-09-18 PROCEDURE — 7100000010 HC PHASE II RECOVERY - FIRST 15 MIN: Performed by: SURGERY

## 2019-09-18 PROCEDURE — 80053 COMPREHEN METABOLIC PANEL: CPT

## 2019-09-18 PROCEDURE — 2500000003 HC RX 250 WO HCPCS: Performed by: SURGERY

## 2019-09-18 PROCEDURE — 85027 COMPLETE CBC AUTOMATED: CPT

## 2019-09-18 PROCEDURE — 85730 THROMBOPLASTIN TIME PARTIAL: CPT

## 2019-09-18 PROCEDURE — 49520 REREPAIR ING HERNIA REDUCE: CPT | Performed by: SURGERY

## 2019-09-18 DEVICE — MESH HERN W3XL6IN INGUINAL POLYPR MFIL RECTANG: Type: IMPLANTABLE DEVICE | Site: GROIN | Status: FUNCTIONAL

## 2019-09-18 RX ORDER — BUPIVACAINE HYDROCHLORIDE AND EPINEPHRINE 5; 5 MG/ML; UG/ML
INJECTION, SOLUTION EPIDURAL; INTRACAUDAL; PERINEURAL
Status: COMPLETED | OUTPATIENT
Start: 2019-09-18 | End: 2019-09-18

## 2019-09-18 RX ORDER — HYDRALAZINE HYDROCHLORIDE 20 MG/ML
5 INJECTION INTRAMUSCULAR; INTRAVENOUS EVERY 10 MIN PRN
Status: DISCONTINUED | OUTPATIENT
Start: 2019-09-18 | End: 2019-09-18 | Stop reason: HOSPADM

## 2019-09-18 RX ORDER — PROCHLORPERAZINE EDISYLATE 5 MG/ML
5 INJECTION INTRAMUSCULAR; INTRAVENOUS
Status: DISCONTINUED | OUTPATIENT
Start: 2019-09-18 | End: 2019-09-18 | Stop reason: HOSPADM

## 2019-09-18 RX ORDER — GLYCOPYRROLATE 0.2 MG/ML
INJECTION INTRAMUSCULAR; INTRAVENOUS PRN
Status: DISCONTINUED | OUTPATIENT
Start: 2019-09-18 | End: 2019-09-18 | Stop reason: SDUPTHER

## 2019-09-18 RX ORDER — ONDANSETRON 2 MG/ML
4 INJECTION INTRAMUSCULAR; INTRAVENOUS
Status: DISCONTINUED | OUTPATIENT
Start: 2019-09-18 | End: 2019-09-18 | Stop reason: HOSPADM

## 2019-09-18 RX ORDER — SODIUM CHLORIDE, SODIUM LACTATE, POTASSIUM CHLORIDE, CALCIUM CHLORIDE 600; 310; 30; 20 MG/100ML; MG/100ML; MG/100ML; MG/100ML
INJECTION, SOLUTION INTRAVENOUS CONTINUOUS PRN
Status: DISCONTINUED | OUTPATIENT
Start: 2019-09-18 | End: 2019-09-18 | Stop reason: SDUPTHER

## 2019-09-18 RX ORDER — FENTANYL CITRATE 50 UG/ML
25 INJECTION, SOLUTION INTRAMUSCULAR; INTRAVENOUS EVERY 5 MIN PRN
Status: DISCONTINUED | OUTPATIENT
Start: 2019-09-18 | End: 2019-09-18 | Stop reason: HOSPADM

## 2019-09-18 RX ORDER — LIDOCAINE HYDROCHLORIDE 20 MG/ML
INJECTION, SOLUTION EPIDURAL; INFILTRATION; INTRACAUDAL; PERINEURAL PRN
Status: DISCONTINUED | OUTPATIENT
Start: 2019-09-18 | End: 2019-09-18 | Stop reason: SDUPTHER

## 2019-09-18 RX ORDER — PROPOFOL 10 MG/ML
INJECTION, EMULSION INTRAVENOUS PRN
Status: DISCONTINUED | OUTPATIENT
Start: 2019-09-18 | End: 2019-09-18 | Stop reason: SDUPTHER

## 2019-09-18 RX ORDER — OXYCODONE HYDROCHLORIDE AND ACETAMINOPHEN 5; 325 MG/1; MG/1
TABLET ORAL
Status: COMPLETED
Start: 2019-09-18 | End: 2019-09-18

## 2019-09-18 RX ORDER — SODIUM CHLORIDE 0.9 % (FLUSH) 0.9 %
10 SYRINGE (ML) INJECTION PRN
Status: CANCELLED | OUTPATIENT
Start: 2019-09-18

## 2019-09-18 RX ORDER — FENTANYL CITRATE 50 UG/ML
INJECTION, SOLUTION INTRAMUSCULAR; INTRAVENOUS PRN
Status: DISCONTINUED | OUTPATIENT
Start: 2019-09-18 | End: 2019-09-18 | Stop reason: SDUPTHER

## 2019-09-18 RX ORDER — HYDROMORPHONE HCL 110MG/55ML
0.5 PATIENT CONTROLLED ANALGESIA SYRINGE INTRAVENOUS EVERY 5 MIN PRN
Status: DISCONTINUED | OUTPATIENT
Start: 2019-09-18 | End: 2019-09-18 | Stop reason: HOSPADM

## 2019-09-18 RX ORDER — LABETALOL HYDROCHLORIDE 5 MG/ML
5 INJECTION, SOLUTION INTRAVENOUS EVERY 10 MIN PRN
Status: DISCONTINUED | OUTPATIENT
Start: 2019-09-18 | End: 2019-09-18 | Stop reason: HOSPADM

## 2019-09-18 RX ORDER — DEXAMETHASONE SODIUM PHOSPHATE 4 MG/ML
INJECTION, SOLUTION INTRA-ARTICULAR; INTRALESIONAL; INTRAMUSCULAR; INTRAVENOUS; SOFT TISSUE PRN
Status: DISCONTINUED | OUTPATIENT
Start: 2019-09-18 | End: 2019-09-18 | Stop reason: SDUPTHER

## 2019-09-18 RX ORDER — ROCURONIUM BROMIDE 10 MG/ML
INJECTION, SOLUTION INTRAVENOUS PRN
Status: DISCONTINUED | OUTPATIENT
Start: 2019-09-18 | End: 2019-09-18 | Stop reason: SDUPTHER

## 2019-09-18 RX ORDER — SODIUM CHLORIDE 0.9 % (FLUSH) 0.9 %
10 SYRINGE (ML) INJECTION EVERY 12 HOURS SCHEDULED
Status: CANCELLED | OUTPATIENT
Start: 2019-09-18

## 2019-09-18 RX ORDER — CEFAZOLIN SODIUM 2 G/100ML
2 INJECTION, SOLUTION INTRAVENOUS ONCE
Status: COMPLETED | OUTPATIENT
Start: 2019-09-18 | End: 2019-09-18

## 2019-09-18 RX ORDER — OXYCODONE HYDROCHLORIDE AND ACETAMINOPHEN 5; 325 MG/1; MG/1
1 TABLET ORAL ONCE
Status: COMPLETED | OUTPATIENT
Start: 2019-09-18 | End: 2019-09-18

## 2019-09-18 RX ORDER — OXYCODONE HYDROCHLORIDE AND ACETAMINOPHEN 5; 325 MG/1; MG/1
1-2 TABLET ORAL EVERY 4 HOURS PRN
Qty: 20 TABLET | Refills: 0 | Status: SHIPPED | OUTPATIENT
Start: 2019-09-18 | End: 2019-09-21

## 2019-09-18 RX ORDER — SODIUM CHLORIDE 9 MG/ML
INJECTION, SOLUTION INTRAVENOUS CONTINUOUS PRN
Status: DISCONTINUED | OUTPATIENT
Start: 2019-09-18 | End: 2019-09-18

## 2019-09-18 RX ORDER — LIDOCAINE HYDROCHLORIDE 10 MG/ML
1 INJECTION, SOLUTION EPIDURAL; INFILTRATION; INTRACAUDAL; PERINEURAL
Status: CANCELLED | OUTPATIENT
Start: 2019-09-18 | End: 2019-09-18

## 2019-09-18 RX ORDER — NEOSTIGMINE METHYLSULFATE 5 MG/5 ML
SYRINGE (ML) INTRAVENOUS PRN
Status: DISCONTINUED | OUTPATIENT
Start: 2019-09-18 | End: 2019-09-18 | Stop reason: SDUPTHER

## 2019-09-18 RX ORDER — ONDANSETRON 2 MG/ML
INJECTION INTRAMUSCULAR; INTRAVENOUS PRN
Status: DISCONTINUED | OUTPATIENT
Start: 2019-09-18 | End: 2019-09-18 | Stop reason: SDUPTHER

## 2019-09-18 RX ORDER — SODIUM CHLORIDE, SODIUM LACTATE, POTASSIUM CHLORIDE, CALCIUM CHLORIDE 600; 310; 30; 20 MG/100ML; MG/100ML; MG/100ML; MG/100ML
INJECTION, SOLUTION INTRAVENOUS CONTINUOUS
Status: CANCELLED | OUTPATIENT
Start: 2019-09-18

## 2019-09-18 RX ORDER — EPHEDRINE SULFATE 50 MG/ML
INJECTION INTRAVENOUS PRN
Status: DISCONTINUED | OUTPATIENT
Start: 2019-09-18 | End: 2019-09-18 | Stop reason: SDUPTHER

## 2019-09-18 RX ADMIN — FENTANYL CITRATE 25 MCG: 50 INJECTION INTRAMUSCULAR; INTRAVENOUS at 16:06

## 2019-09-18 RX ADMIN — HYDROMORPHONE HYDROCHLORIDE 0.5 MG: 2 INJECTION INTRAMUSCULAR; INTRAVENOUS; SUBCUTANEOUS at 16:16

## 2019-09-18 RX ADMIN — GLYCOPYRROLATE 0.4 MG: 0.2 INJECTION, SOLUTION INTRAMUSCULAR; INTRAVENOUS at 15:19

## 2019-09-18 RX ADMIN — LIDOCAINE HYDROCHLORIDE 60 MG: 20 INJECTION, SOLUTION EPIDURAL; INFILTRATION; INTRACAUDAL; PERINEURAL at 14:05

## 2019-09-18 RX ADMIN — OXYCODONE HYDROCHLORIDE AND ACETAMINOPHEN 1 TABLET: 5; 325 TABLET ORAL at 16:54

## 2019-09-18 RX ADMIN — FENTANYL CITRATE 50 MCG: 50 INJECTION, SOLUTION INTRAMUSCULAR; INTRAVENOUS at 15:11

## 2019-09-18 RX ADMIN — ROCURONIUM BROMIDE 30 MG: 10 INJECTION, SOLUTION INTRAVENOUS at 14:05

## 2019-09-18 RX ADMIN — ROCURONIUM BROMIDE 10 MG: 10 INJECTION, SOLUTION INTRAVENOUS at 14:57

## 2019-09-18 RX ADMIN — ONDANSETRON 4 MG: 2 INJECTION INTRAMUSCULAR; INTRAVENOUS at 15:03

## 2019-09-18 RX ADMIN — DEXAMETHASONE SODIUM PHOSPHATE 8 MG: 4 INJECTION, SOLUTION INTRAMUSCULAR; INTRAVENOUS at 14:12

## 2019-09-18 RX ADMIN — GLYCOPYRROLATE 0.2 MG: 0.2 INJECTION, SOLUTION INTRAMUSCULAR; INTRAVENOUS at 14:18

## 2019-09-18 RX ADMIN — CEFAZOLIN SODIUM 2 G: 2 INJECTION, SOLUTION INTRAVENOUS at 13:53

## 2019-09-18 RX ADMIN — FENTANYL CITRATE 100 MCG: 50 INJECTION, SOLUTION INTRAMUSCULAR; INTRAVENOUS at 14:05

## 2019-09-18 RX ADMIN — EPHEDRINE SULFATE 10 MG: 50 INJECTION, SOLUTION INTRAVENOUS at 14:14

## 2019-09-18 RX ADMIN — Medication 3 MG: at 15:19

## 2019-09-18 RX ADMIN — SODIUM CHLORIDE, POTASSIUM CHLORIDE, SODIUM LACTATE AND CALCIUM CHLORIDE: 600; 310; 30; 20 INJECTION, SOLUTION INTRAVENOUS at 15:00

## 2019-09-18 RX ADMIN — HYDROMORPHONE HYDROCHLORIDE 0.5 MG: 2 INJECTION INTRAMUSCULAR; INTRAVENOUS; SUBCUTANEOUS at 16:03

## 2019-09-18 RX ADMIN — PROPOFOL 50 MG: 10 INJECTION, EMULSION INTRAVENOUS at 15:32

## 2019-09-18 RX ADMIN — PROPOFOL 50 MG: 10 INJECTION, EMULSION INTRAVENOUS at 14:57

## 2019-09-18 RX ADMIN — PROPOFOL 150 MG: 10 INJECTION, EMULSION INTRAVENOUS at 14:05

## 2019-09-18 RX ADMIN — SODIUM CHLORIDE, POTASSIUM CHLORIDE, SODIUM LACTATE AND CALCIUM CHLORIDE: 600; 310; 30; 20 INJECTION, SOLUTION INTRAVENOUS at 13:56

## 2019-09-18 ASSESSMENT — PULMONARY FUNCTION TESTS
PIF_VALUE: 27
PIF_VALUE: 16
PIF_VALUE: 17
PIF_VALUE: 17
PIF_VALUE: 16
PIF_VALUE: 17
PIF_VALUE: 16
PIF_VALUE: 17
PIF_VALUE: 16
PIF_VALUE: 16
PIF_VALUE: 1
PIF_VALUE: 16
PIF_VALUE: 15
PIF_VALUE: 16
PIF_VALUE: 2
PIF_VALUE: 16
PIF_VALUE: 35
PIF_VALUE: 16
PIF_VALUE: 1
PIF_VALUE: 0
PIF_VALUE: 15
PIF_VALUE: 16
PIF_VALUE: 33
PIF_VALUE: 17
PIF_VALUE: 16
PIF_VALUE: 22
PIF_VALUE: 19
PIF_VALUE: 2
PIF_VALUE: 6
PIF_VALUE: 16
PIF_VALUE: 15
PIF_VALUE: 15
PIF_VALUE: 17
PIF_VALUE: 15
PIF_VALUE: 15
PIF_VALUE: 16
PIF_VALUE: 1
PIF_VALUE: 19
PIF_VALUE: 15
PIF_VALUE: 15
PIF_VALUE: 0
PIF_VALUE: 3
PIF_VALUE: 1
PIF_VALUE: 1
PIF_VALUE: 19
PIF_VALUE: 3
PIF_VALUE: 17
PIF_VALUE: 15
PIF_VALUE: 16
PIF_VALUE: 1
PIF_VALUE: 20
PIF_VALUE: 16
PIF_VALUE: 15
PIF_VALUE: 15
PIF_VALUE: 16
PIF_VALUE: 16
PIF_VALUE: 3
PIF_VALUE: 17
PIF_VALUE: 16
PIF_VALUE: 17
PIF_VALUE: 16
PIF_VALUE: 15
PIF_VALUE: 18
PIF_VALUE: 16
PIF_VALUE: 16
PIF_VALUE: 15
PIF_VALUE: 16
PIF_VALUE: 17
PIF_VALUE: 16
PIF_VALUE: 15
PIF_VALUE: 17
PIF_VALUE: 15
PIF_VALUE: 16
PIF_VALUE: 18
PIF_VALUE: 15
PIF_VALUE: 17
PIF_VALUE: 16
PIF_VALUE: 17
PIF_VALUE: 16
PIF_VALUE: 0
PIF_VALUE: 16
PIF_VALUE: 15
PIF_VALUE: 19
PIF_VALUE: 17
PIF_VALUE: 3
PIF_VALUE: 2
PIF_VALUE: 16
PIF_VALUE: 15
PIF_VALUE: 16
PIF_VALUE: 16
PIF_VALUE: 3
PIF_VALUE: 16
PIF_VALUE: 16
PIF_VALUE: 18
PIF_VALUE: 17
PIF_VALUE: 19
PIF_VALUE: 15
PIF_VALUE: 16
PIF_VALUE: 17
PIF_VALUE: 19
PIF_VALUE: 16

## 2019-09-18 ASSESSMENT — PAIN DESCRIPTION - DESCRIPTORS: DESCRIPTORS: PATIENT UNABLE TO DESCRIBE

## 2019-09-18 ASSESSMENT — PAIN SCALES - GENERAL
PAINLEVEL_OUTOF10: 0
PAINLEVEL_OUTOF10: 7
PAINLEVEL_OUTOF10: 2
PAINLEVEL_OUTOF10: 7
PAINLEVEL_OUTOF10: 6
PAINLEVEL_OUTOF10: 6

## 2019-09-18 ASSESSMENT — PAIN DESCRIPTION - LOCATION: LOCATION: GROIN

## 2019-09-18 ASSESSMENT — ENCOUNTER SYMPTOMS: SHORTNESS OF BREATH: 0

## 2019-09-18 ASSESSMENT — PAIN - FUNCTIONAL ASSESSMENT: PAIN_FUNCTIONAL_ASSESSMENT: 0-10

## 2019-09-18 ASSESSMENT — PAIN DESCRIPTION - PAIN TYPE: TYPE: SURGICAL PAIN

## 2019-09-18 ASSESSMENT — PAIN DESCRIPTION - ORIENTATION: ORIENTATION: LEFT

## 2019-09-18 NOTE — ANESTHESIA PRE PROCEDURE
Date    Acute encephalopathy 12/11/2017    Alcohol dependence ([address] 0/5/7182    Alcoholic liver disease ([address] 12/11/2017    Anemia 12/11/2017    Anxiety     Arthritis of carpometacarpal (CMC) joint of left thumb 9/14/2017    Arthritis of left hip 6/22/2017    Arthritis of left knee 6/22/2017    Chest tightness     Continuous visual hallucinations 12/11/2017    DDD (degenerative disc disease), lumbosacral 12/15/2014    Delirium due to known physiological condition     Delirium tremens ([address] 12/11/2017    Episode of syncope 10/9/2013    Erectile dysfunction     Generalized osteoarthritis of multiple sites     Hematoma of groin     Hiatal hernia     PER CXR    Hypertension     Hyponatremia 12/11/2017    Intractable migraine without aura     Localization-related (focal) (partial) epilepsy and epileptic syndromes with complex partial seizures, without mention of intractable epilepsy     LAST SEIZURE 4/2015    Lung nodule < 6cm on CT 4/15/2016    Migraine without aura, with intractable migraine, so stated, without mention of status migrainosus     Migraine without status migrainosus, not intractable 10/9/2013    Movement disorder     Myalgia     Nicotine dependence, cigarettes, uncomplicated 4/45/8306    Pancreatitis     Panlobular emphysema ([address] 3/29/2016    Partial epilepsy with impairment of consciousness ([address] 09/16/2019    PT STATES LAST SEIZURE WAS ABOUT 3 YEARS AGO    Partial epilepsy with impairment of consciousness, intractable ([address] 9/9/2013    Primary osteoarthritis of left hip 9/14/2017    Prostatic hypertrophy, benign     Recurrent inguinal hernia 4/21/2015    Recurrent left inguinal hernia     Restless leg syndrome 10/6/2015    S/P lumbar fusion 7/23/2014    Sacroiliac dysfunction 7/23/2014    Sacroiliac inflammation ([address] 6/6/2013    Sciatica 11/20/2013    Seizure ([address] 5/4/2015    Seizure disorder 4/30/2013    SI (sacroiliac) pain 12/13/2012    Syncope     0.7 12/22/2017    GFRAA >60 12/22/2017    GFRAA >60 10/08/2012    AGRATIO 2.3 12/18/2017    LABGLOM >60 12/22/2017    GLUCOSE 86 12/22/2017    PROT 6.5 12/18/2017    PROT 7.0 10/08/2012    CALCIUM 9.2 12/22/2017    BILITOT <0.2 12/18/2017    ALKPHOS 69 12/18/2017    AST 34 12/18/2017    ALT 37 12/18/2017       POC Tests: No results for input(s): POCGLU, POCNA, POCK, POCCL, POCBUN, POCHEMO, POCHCT in the last 72 hours. Coags:   Lab Results   Component Value Date    PROTIME 11.9 01/22/2012    INR 1.09 01/22/2012       HCG (If Applicable): No results found for: PREGTESTUR, PREGSERUM, HCG, HCGQUANT     ABGs: No results found for: PHART, PO2ART, RCM8SUX, FUJ5VVG, BEART, D9QBSBBM     Type & Screen (If Applicable):  No results found for: LABABO, LABRH    Anesthesia Evaluation   no history of anesthetic complications:   Airway: Mallampati: II  TM distance: >3 FB   Neck ROM: full  Mouth opening: > = 3 FB Dental:    (+) upper dentures and lower dentures      Pulmonary:   (+) COPD:      (-) shortness of breath                           Cardiovascular:  Exercise tolerance: good (>4 METS),   (+) hypertension:,     (-)  CHF          Echocardiogram reviewed                  Neuro/Psych:   (+) seizures:, CVA:, headaches:, psychiatric history: stable with treatment            GI/Hepatic/Renal:   (+) hiatal hernia,           Endo/Other:    (+) : arthritis:., .                 Abdominal:           Vascular:                                    Anesthesia Plan      general     ASA 3       Induction: intravenous. MIPS: Postoperative opioids intended and Prophylactic antiemetics administered. Anesthetic plan and risks discussed with patient. Use of blood products discussed with patient whom. Plan discussed with CRNA.                   Seven Patterson MD   9/18/2019

## 2019-09-18 NOTE — H&P
BY MOUTH EVERY NIGHT AT BEDTIME 4/17/18   Sky Jenkins MD   amLODIPine (NORVASC) 5 MG tablet Take 1 tablet by mouth daily 12/15/17   Checo Luu MD       Active Problems:    * No active hospital problems. *  Resolved Problems:    * No resolved hospital problems. *      Blood pressure (!) 172/92, pulse 58, temperature 97.2 °F (36.2 °C), temperature source Temporal, resp. rate 18, height 5' 5.5\" (1.664 m), weight 135 lb 1.6 oz (61.3 kg), SpO2 100 %. Review of Systems    Physical Exam   Cardiovascular: Normal rate and regular rhythm.    Pulmonary/Chest: Effort normal and breath sounds normal.       Assessment:  Recurrent LIH    Plan:  Open LIH repair with mesh    Asim Rodríguez MD  9/18/2019

## 2019-10-03 ENCOUNTER — OFFICE VISIT (OUTPATIENT)
Dept: SURGERY | Age: 64
End: 2019-10-03

## 2019-10-03 VITALS — BODY MASS INDEX: 22.29 KG/M2 | DIASTOLIC BLOOD PRESSURE: 90 MMHG | SYSTOLIC BLOOD PRESSURE: 150 MMHG | WEIGHT: 136 LBS

## 2019-10-03 DIAGNOSIS — K40.91 UNILATERAL RECURRENT INGUINAL HERNIA WITHOUT OBSTRUCTION OR GANGRENE: Primary | ICD-10-CM

## 2019-10-03 PROCEDURE — 99024 POSTOP FOLLOW-UP VISIT: CPT | Performed by: SURGERY

## 2019-10-03 RX ORDER — OXYCODONE HYDROCHLORIDE AND ACETAMINOPHEN 5; 325 MG/1; MG/1
1-2 TABLET ORAL EVERY 4 HOURS PRN
Qty: 12 TABLET | Refills: 0 | Status: SHIPPED | OUTPATIENT
Start: 2019-10-03 | End: 2019-10-06

## 2019-10-21 ENCOUNTER — OFFICE VISIT (OUTPATIENT)
Dept: INTERNAL MEDICINE CLINIC | Age: 64
End: 2019-10-21
Payer: MEDICARE

## 2019-10-21 VITALS
HEIGHT: 66 IN | WEIGHT: 134 LBS | SYSTOLIC BLOOD PRESSURE: 136 MMHG | DIASTOLIC BLOOD PRESSURE: 86 MMHG | HEART RATE: 96 BPM | BODY MASS INDEX: 21.53 KG/M2

## 2019-10-21 DIAGNOSIS — Z12.5 SCREENING FOR PROSTATE CANCER: ICD-10-CM

## 2019-10-21 DIAGNOSIS — J43.1 PANLOBULAR EMPHYSEMA (HCC): Primary | Chronic | ICD-10-CM

## 2019-10-21 DIAGNOSIS — Z23 NEED FOR PROPHYLACTIC VACCINATION AGAINST STREPTOCOCCUS PNEUMONIAE (PNEUMOCOCCUS): ICD-10-CM

## 2019-10-21 DIAGNOSIS — I10 ESSENTIAL HYPERTENSION: ICD-10-CM

## 2019-10-21 DIAGNOSIS — J43.9 PULMONARY EMPHYSEMA, UNSPECIFIED EMPHYSEMA TYPE (HCC): ICD-10-CM

## 2019-10-21 DIAGNOSIS — Z87.891 PERSONAL HISTORY OF TOBACCO USE: ICD-10-CM

## 2019-10-21 PROCEDURE — G8427 DOCREV CUR MEDS BY ELIG CLIN: HCPCS | Performed by: INTERNAL MEDICINE

## 2019-10-21 PROCEDURE — 3023F SPIROM DOC REV: CPT | Performed by: INTERNAL MEDICINE

## 2019-10-21 PROCEDURE — 4004F PT TOBACCO SCREEN RCVD TLK: CPT | Performed by: INTERNAL MEDICINE

## 2019-10-21 PROCEDURE — 99214 OFFICE O/P EST MOD 30 MIN: CPT | Performed by: INTERNAL MEDICINE

## 2019-10-21 PROCEDURE — G8420 CALC BMI NORM PARAMETERS: HCPCS | Performed by: INTERNAL MEDICINE

## 2019-10-21 PROCEDURE — 1123F ACP DISCUSS/DSCN MKR DOCD: CPT | Performed by: INTERNAL MEDICINE

## 2019-10-21 PROCEDURE — 3017F COLORECTAL CA SCREEN DOC REV: CPT | Performed by: INTERNAL MEDICINE

## 2019-10-21 PROCEDURE — 90670 PCV13 VACCINE IM: CPT | Performed by: INTERNAL MEDICINE

## 2019-10-21 PROCEDURE — G0009 ADMIN PNEUMOCOCCAL VACCINE: HCPCS | Performed by: INTERNAL MEDICINE

## 2019-10-21 PROCEDURE — G8484 FLU IMMUNIZE NO ADMIN: HCPCS | Performed by: INTERNAL MEDICINE

## 2019-10-21 PROCEDURE — 4040F PNEUMOC VAC/ADMIN/RCVD: CPT | Performed by: INTERNAL MEDICINE

## 2019-10-21 PROCEDURE — G0296 VISIT TO DETERM LDCT ELIG: HCPCS | Performed by: INTERNAL MEDICINE

## 2019-10-21 PROCEDURE — G8926 SPIRO NO PERF OR DOC: HCPCS | Performed by: INTERNAL MEDICINE

## 2019-10-21 RX ORDER — BUDESONIDE AND FORMOTEROL FUMARATE DIHYDRATE 160; 4.5 UG/1; UG/1
2 AEROSOL RESPIRATORY (INHALATION) 2 TIMES DAILY
Qty: 3 INHALER | Refills: 1 | Status: SHIPPED | OUTPATIENT
Start: 2019-10-21 | End: 2020-02-05 | Stop reason: SDUPTHER

## 2019-10-21 RX ORDER — ALBUTEROL SULFATE 90 UG/1
AEROSOL, METERED RESPIRATORY (INHALATION)
Qty: 1 INHALER | Refills: 2 | Status: SHIPPED | OUTPATIENT
Start: 2019-10-21 | End: 2020-02-05 | Stop reason: SDUPTHER

## 2019-10-21 SDOH — HEALTH STABILITY: MENTAL HEALTH: HOW OFTEN DO YOU HAVE A DRINK CONTAINING ALCOHOL?: 2-3 TIMES A WEEK

## 2019-10-21 SDOH — HEALTH STABILITY: MENTAL HEALTH: HOW MANY STANDARD DRINKS CONTAINING ALCOHOL DO YOU HAVE ON A TYPICAL DAY?: 3 OR 4

## 2019-10-21 ASSESSMENT — ENCOUNTER SYMPTOMS
WHEEZING: 0
VOMITING: 0
COUGH: 1
VOICE CHANGE: 0
PHOTOPHOBIA: 0
ABDOMINAL PAIN: 0
STRIDOR: 0
TROUBLE SWALLOWING: 0
NAUSEA: 0

## 2019-10-21 ASSESSMENT — PATIENT HEALTH QUESTIONNAIRE - PHQ9
SUM OF ALL RESPONSES TO PHQ9 QUESTIONS 1 & 2: 0
1. LITTLE INTEREST OR PLEASURE IN DOING THINGS: 0
SUM OF ALL RESPONSES TO PHQ QUESTIONS 1-9: 0
2. FEELING DOWN, DEPRESSED OR HOPELESS: 0
SUM OF ALL RESPONSES TO PHQ QUESTIONS 1-9: 0

## 2019-10-28 DIAGNOSIS — Z12.5 SCREENING FOR PROSTATE CANCER: ICD-10-CM

## 2019-10-28 DIAGNOSIS — I10 ESSENTIAL HYPERTENSION: ICD-10-CM

## 2019-10-28 LAB
CHOLESTEROL, FASTING: 200 MG/DL (ref 0–199)
HDLC SERPL-MCNC: 97 MG/DL (ref 40–60)
LDL CHOLESTEROL CALCULATED: 94 MG/DL
PROSTATE SPECIFIC ANTIGEN: 2.28 NG/ML (ref 0–4)
TRIGLYCERIDE, FASTING: 46 MG/DL (ref 0–150)
TSH REFLEX: 1.09 UIU/ML (ref 0.27–4.2)
VLDLC SERPL CALC-MCNC: 9 MG/DL

## 2019-11-16 ENCOUNTER — HOSPITAL ENCOUNTER (OUTPATIENT)
Dept: CT IMAGING | Age: 64
Discharge: HOME | End: 2019-11-16
Payer: MEDICARE

## 2019-11-16 DIAGNOSIS — Z87.891 PERSONAL HISTORY OF TOBACCO USE: ICD-10-CM

## 2019-11-16 PROCEDURE — G0297 LDCT FOR LUNG CA SCREEN: HCPCS

## 2020-02-05 ENCOUNTER — OFFICE VISIT (OUTPATIENT)
Dept: INTERNAL MEDICINE CLINIC | Age: 65
End: 2020-02-05
Payer: MEDICARE

## 2020-02-05 ENCOUNTER — TELEPHONE (OUTPATIENT)
Dept: INTERNAL MEDICINE CLINIC | Age: 65
End: 2020-02-05

## 2020-02-05 VITALS
WEIGHT: 138 LBS | DIASTOLIC BLOOD PRESSURE: 98 MMHG | SYSTOLIC BLOOD PRESSURE: 160 MMHG | HEART RATE: 90 BPM | BODY MASS INDEX: 22.18 KG/M2 | HEIGHT: 66 IN

## 2020-02-05 DIAGNOSIS — G44.229 CHRONIC TENSION-TYPE HEADACHE, NOT INTRACTABLE: ICD-10-CM

## 2020-02-05 LAB
C-REACTIVE PROTEIN: 0.9 MG/L (ref 0–5.1)
HCT VFR BLD CALC: 47.7 % (ref 40.5–52.5)
HEMOGLOBIN: 16.4 G/DL (ref 13.5–17.5)
MCH RBC QN AUTO: 33.8 PG (ref 26–34)
MCHC RBC AUTO-ENTMCNC: 34.4 G/DL (ref 31–36)
MCV RBC AUTO: 98.1 FL (ref 80–100)
PDW BLD-RTO: 13 % (ref 12.4–15.4)
PLATELET # BLD: 246 K/UL (ref 135–450)
PMV BLD AUTO: 8.5 FL (ref 5–10.5)
RBC # BLD: 4.86 M/UL (ref 4.2–5.9)
WBC # BLD: 6.3 K/UL (ref 4–11)

## 2020-02-05 PROCEDURE — 4004F PT TOBACCO SCREEN RCVD TLK: CPT | Performed by: INTERNAL MEDICINE

## 2020-02-05 PROCEDURE — 4040F PNEUMOC VAC/ADMIN/RCVD: CPT | Performed by: INTERNAL MEDICINE

## 2020-02-05 PROCEDURE — 3023F SPIROM DOC REV: CPT | Performed by: INTERNAL MEDICINE

## 2020-02-05 PROCEDURE — 1123F ACP DISCUSS/DSCN MKR DOCD: CPT | Performed by: INTERNAL MEDICINE

## 2020-02-05 PROCEDURE — 99214 OFFICE O/P EST MOD 30 MIN: CPT | Performed by: INTERNAL MEDICINE

## 2020-02-05 PROCEDURE — G8484 FLU IMMUNIZE NO ADMIN: HCPCS | Performed by: INTERNAL MEDICINE

## 2020-02-05 PROCEDURE — G8420 CALC BMI NORM PARAMETERS: HCPCS | Performed by: INTERNAL MEDICINE

## 2020-02-05 PROCEDURE — G8427 DOCREV CUR MEDS BY ELIG CLIN: HCPCS | Performed by: INTERNAL MEDICINE

## 2020-02-05 PROCEDURE — 3017F COLORECTAL CA SCREEN DOC REV: CPT | Performed by: INTERNAL MEDICINE

## 2020-02-05 PROCEDURE — G8926 SPIRO NO PERF OR DOC: HCPCS | Performed by: INTERNAL MEDICINE

## 2020-02-05 RX ORDER — ALBUTEROL SULFATE 90 UG/1
AEROSOL, METERED RESPIRATORY (INHALATION)
Qty: 3 INHALER | Refills: 1 | Status: SHIPPED | OUTPATIENT
Start: 2020-02-05 | End: 2020-07-29 | Stop reason: SDUPTHER

## 2020-02-05 RX ORDER — ALBUTEROL SULFATE 90 UG/1
AEROSOL, METERED RESPIRATORY (INHALATION)
Qty: 1 INHALER | Refills: 2 | Status: SHIPPED | OUTPATIENT
Start: 2020-02-05 | End: 2020-02-05 | Stop reason: SDUPTHER

## 2020-02-05 RX ORDER — BUDESONIDE AND FORMOTEROL FUMARATE DIHYDRATE 160; 4.5 UG/1; UG/1
2 AEROSOL RESPIRATORY (INHALATION) 2 TIMES DAILY
Qty: 3 INHALER | Refills: 3 | Status: SHIPPED | OUTPATIENT
Start: 2020-02-05 | End: 2021-01-21 | Stop reason: SDUPTHER

## 2020-02-05 RX ORDER — ATENOLOL 50 MG/1
50 TABLET ORAL DAILY
Qty: 90 TABLET | Refills: 1 | Status: SHIPPED
Start: 2020-02-05 | End: 2020-03-17 | Stop reason: ALTCHOICE

## 2020-02-05 RX ORDER — HYDROXYZINE HYDROCHLORIDE 25 MG/1
25 TABLET, FILM COATED ORAL NIGHTLY PRN
Qty: 30 TABLET | Refills: 0 | Status: SHIPPED | OUTPATIENT
Start: 2020-02-05 | End: 2020-03-06 | Stop reason: SDUPTHER

## 2020-02-05 SDOH — ECONOMIC STABILITY: FOOD INSECURITY: WITHIN THE PAST 12 MONTHS, THE FOOD YOU BOUGHT JUST DIDN'T LAST AND YOU DIDN'T HAVE MONEY TO GET MORE.: NEVER TRUE

## 2020-02-05 SDOH — ECONOMIC STABILITY: TRANSPORTATION INSECURITY
IN THE PAST 12 MONTHS, HAS LACK OF TRANSPORTATION KEPT YOU FROM MEETINGS, WORK, OR FROM GETTING THINGS NEEDED FOR DAILY LIVING?: NO

## 2020-02-05 SDOH — ECONOMIC STABILITY: TRANSPORTATION INSECURITY
IN THE PAST 12 MONTHS, HAS THE LACK OF TRANSPORTATION KEPT YOU FROM MEDICAL APPOINTMENTS OR FROM GETTING MEDICATIONS?: NO

## 2020-02-05 SDOH — ECONOMIC STABILITY: INCOME INSECURITY: HOW HARD IS IT FOR YOU TO PAY FOR THE VERY BASICS LIKE FOOD, HOUSING, MEDICAL CARE, AND HEATING?: NOT HARD AT ALL

## 2020-02-05 SDOH — ECONOMIC STABILITY: FOOD INSECURITY: WITHIN THE PAST 12 MONTHS, YOU WORRIED THAT YOUR FOOD WOULD RUN OUT BEFORE YOU GOT MONEY TO BUY MORE.: NEVER TRUE

## 2020-02-05 ASSESSMENT — ENCOUNTER SYMPTOMS
VOMITING: 0
EYE REDNESS: 0
TROUBLE SWALLOWING: 0
VOICE CHANGE: 0
ABDOMINAL PAIN: 0
PHOTOPHOBIA: 0
COUGH: 0
SHORTNESS OF BREATH: 1
NAUSEA: 0
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
## 2020-02-05 NOTE — TELEPHONE ENCOUNTER
Pt calling ---needs written scripts  for the Albuterol and the symbicort so he can take to different pharmacy                             to try to get meds cheaper ---his payment went up on these meds. Please tell pt when he can  these scripts. Thanks.

## 2020-02-06 LAB — SEDIMENTATION RATE, ERYTHROCYTE: 7 MM/HR (ref 0–20)

## 2020-03-06 ENCOUNTER — OFFICE VISIT (OUTPATIENT)
Dept: INTERNAL MEDICINE CLINIC | Age: 65
End: 2020-03-06
Payer: MEDICARE

## 2020-03-06 VITALS
BODY MASS INDEX: 22.62 KG/M2 | SYSTOLIC BLOOD PRESSURE: 168 MMHG | HEART RATE: 90 BPM | WEIGHT: 138 LBS | DIASTOLIC BLOOD PRESSURE: 98 MMHG

## 2020-03-06 PROCEDURE — 99213 OFFICE O/P EST LOW 20 MIN: CPT | Performed by: INTERNAL MEDICINE

## 2020-03-06 PROCEDURE — 4040F PNEUMOC VAC/ADMIN/RCVD: CPT | Performed by: INTERNAL MEDICINE

## 2020-03-06 PROCEDURE — G8484 FLU IMMUNIZE NO ADMIN: HCPCS | Performed by: INTERNAL MEDICINE

## 2020-03-06 PROCEDURE — 1123F ACP DISCUSS/DSCN MKR DOCD: CPT | Performed by: INTERNAL MEDICINE

## 2020-03-06 PROCEDURE — 4004F PT TOBACCO SCREEN RCVD TLK: CPT | Performed by: INTERNAL MEDICINE

## 2020-03-06 PROCEDURE — G8427 DOCREV CUR MEDS BY ELIG CLIN: HCPCS | Performed by: INTERNAL MEDICINE

## 2020-03-06 PROCEDURE — G8420 CALC BMI NORM PARAMETERS: HCPCS | Performed by: INTERNAL MEDICINE

## 2020-03-06 PROCEDURE — 3017F COLORECTAL CA SCREEN DOC REV: CPT | Performed by: INTERNAL MEDICINE

## 2020-03-06 RX ORDER — SULFAMETHOXAZOLE AND TRIMETHOPRIM 800; 160 MG/1; MG/1
1 TABLET ORAL 2 TIMES DAILY
Qty: 20 TABLET | Refills: 0 | Status: SHIPPED | OUTPATIENT
Start: 2020-03-06 | End: 2020-03-16

## 2020-03-06 RX ORDER — LOSARTAN POTASSIUM 50 MG/1
50 TABLET ORAL DAILY
Qty: 90 TABLET | Refills: 1 | Status: SHIPPED | OUTPATIENT
Start: 2020-03-06 | End: 2020-04-16 | Stop reason: SDUPTHER

## 2020-03-06 RX ORDER — HYDROXYZINE HYDROCHLORIDE 25 MG/1
25 TABLET, FILM COATED ORAL NIGHTLY PRN
Qty: 30 TABLET | Refills: 0 | Status: SHIPPED | OUTPATIENT
Start: 2020-03-06 | End: 2020-04-05

## 2020-03-06 ASSESSMENT — ENCOUNTER SYMPTOMS
TROUBLE SWALLOWING: 0
ABDOMINAL PAIN: 0
NAUSEA: 0
VOICE CHANGE: 0
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
## 2020-03-17 ENCOUNTER — OFFICE VISIT (OUTPATIENT)
Dept: INTERNAL MEDICINE CLINIC | Age: 65
End: 2020-03-17
Payer: MEDICARE

## 2020-03-17 VITALS
SYSTOLIC BLOOD PRESSURE: 138 MMHG | DIASTOLIC BLOOD PRESSURE: 86 MMHG | BODY MASS INDEX: 21.96 KG/M2 | WEIGHT: 134 LBS | HEART RATE: 72 BPM

## 2020-03-17 PROBLEM — S22.000G COMPRESSION FRACTURE OF THORACIC VERTEBRA WITH DELAYED HEALING: Status: ACTIVE | Noted: 2020-03-17

## 2020-03-17 PROBLEM — K44.9 HIATAL HERNIA WITH GERD: Status: ACTIVE | Noted: 2020-03-17

## 2020-03-17 PROBLEM — K21.9 HIATAL HERNIA WITH GERD: Status: ACTIVE | Noted: 2020-03-17

## 2020-03-17 PROCEDURE — G8510 SCR DEP NEG, NO PLAN REQD: HCPCS | Performed by: INTERNAL MEDICINE

## 2020-03-17 PROCEDURE — 1111F DSCHRG MED/CURRENT MED MERGE: CPT | Performed by: INTERNAL MEDICINE

## 2020-03-17 PROCEDURE — 99496 TRANSJ CARE MGMT HIGH F2F 7D: CPT | Performed by: INTERNAL MEDICINE

## 2020-03-17 RX ORDER — FOLIC ACID 1 MG/1
1 TABLET ORAL DAILY
Qty: 90 TABLET | Refills: 1 | Status: SHIPPED | OUTPATIENT
Start: 2020-03-17 | End: 2020-04-16 | Stop reason: SDUPTHER

## 2020-03-17 RX ORDER — THIAMINE MONONITRATE (VIT B1) 100 MG
100 TABLET ORAL DAILY
Qty: 30 TABLET | Refills: 3 | Status: SHIPPED | OUTPATIENT
Start: 2020-03-17 | End: 2020-04-16 | Stop reason: SDUPTHER

## 2020-03-17 RX ORDER — AMLODIPINE BESYLATE 2.5 MG/1
2.5 TABLET ORAL DAILY
Qty: 90 TABLET | Refills: 1 | Status: SHIPPED | OUTPATIENT
Start: 2020-03-17 | End: 2020-04-16 | Stop reason: DRUGHIGH

## 2020-03-17 RX ORDER — NICOTINE 21 MG/24HR
1 PATCH, TRANSDERMAL 24 HOURS TRANSDERMAL DAILY
Qty: 42 PATCH | Refills: 0 | Status: SHIPPED | OUTPATIENT
Start: 2020-03-17 | End: 2020-04-16 | Stop reason: SDUPTHER

## 2020-03-17 RX ORDER — PANTOPRAZOLE SODIUM 20 MG/1
20 TABLET, DELAYED RELEASE ORAL
Qty: 90 TABLET | Refills: 1 | Status: SHIPPED | OUTPATIENT
Start: 2020-03-17 | End: 2020-04-16 | Stop reason: SDUPTHER

## 2020-03-17 ASSESSMENT — ENCOUNTER SYMPTOMS
TROUBLE SWALLOWING: 0
NAUSEA: 0
VOICE CHANGE: 0
BACK PAIN: 1
SHORTNESS OF BREATH: 0
WHEEZING: 0
BLOOD IN STOOL: 0
VOMITING: 0

## 2020-03-17 ASSESSMENT — PATIENT HEALTH QUESTIONNAIRE - PHQ9
SUM OF ALL RESPONSES TO PHQ9 QUESTIONS 1 & 2: 2
2. FEELING DOWN, DEPRESSED OR HOPELESS: 1
1. LITTLE INTEREST OR PLEASURE IN DOING THINGS: 1
SUM OF ALL RESPONSES TO PHQ QUESTIONS 1-9: 2
SUM OF ALL RESPONSES TO PHQ QUESTIONS 1-9: 2

## 2020-03-17 NOTE — PROGRESS NOTES
Alcohol use-patient have reduce alcohol use significantly. - vitamin B-1 (THIAMINE) 100 MG tablet; Take 1 tablet by mouth daily  Dispense: 30 tablet; Refill: 3  - folic acid (FOLVITE) 1 MG tablet; Take 1 tablet by mouth daily  Dispense: 90 tablet; Refill: 1    6. Other fracture of T7-t8 thoracic vertebra, initial encounter for closed fracture ([facility]     - DEXA BONE DENSITY AXIAL SKELETON; Future  - ME DISCHARGE MEDS RECONCILED W/ CURRENT OUTPATIENT MED LIST    7. Cigarette nicotine dependence with nicotine-induced disorder    - nicotine (NICODERM CQ) 21 MG/24HR; Place 1 patch onto the skin daily  Dispense: 42 patch; Refill: 0  - ME DISCHARGE MEDS RECONCILED W/ CURRENT OUTPATIENT MED LIST        Medical Decision Making: high complexity    An After Visit Summary was printed and given to the patient. Documentation was done using voice recognition dragon software. Every effort was made to ensure accuracy; however, inadvertent  Unintentional computerized transcription errors may be present.

## 2020-03-18 ENCOUNTER — TELEPHONE (OUTPATIENT)
Dept: INTERNAL MEDICINE CLINIC | Age: 65
End: 2020-03-18

## 2020-03-18 RX ORDER — ACETAMINOPHEN AND CODEINE PHOSPHATE 300; 30 MG/1; MG/1
1 TABLET ORAL EVERY 6 HOURS PRN
Qty: 12 TABLET | Refills: 0 | Status: SHIPPED | OUTPATIENT
Start: 2020-03-18 | End: 2020-04-16 | Stop reason: SDUPTHER

## 2020-03-18 NOTE — TELEPHONE ENCOUNTER
Pt called and cannot get comfortable and sleep. Was up half the night. He would like to see if he can get a script for the pain. The place he was referred to cannot get him him until 4/17/2020 to be seen and cannot give him meds until he sees them 1st. This is for the discs that has collapsed in his back that dr Amanda Fitzpatrick told him about.      Please advise pt

## 2020-03-19 ENCOUNTER — OFFICE VISIT (OUTPATIENT)
Dept: SURGERY | Age: 65
End: 2020-03-19
Payer: MEDICARE

## 2020-03-19 VITALS
TEMPERATURE: 98.2 F | SYSTOLIC BLOOD PRESSURE: 128 MMHG | WEIGHT: 133 LBS | BODY MASS INDEX: 21.8 KG/M2 | DIASTOLIC BLOOD PRESSURE: 70 MMHG

## 2020-03-19 PROCEDURE — 3017F COLORECTAL CA SCREEN DOC REV: CPT | Performed by: SURGERY

## 2020-03-19 PROCEDURE — 4040F PNEUMOC VAC/ADMIN/RCVD: CPT | Performed by: SURGERY

## 2020-03-19 PROCEDURE — G8427 DOCREV CUR MEDS BY ELIG CLIN: HCPCS | Performed by: SURGERY

## 2020-03-19 PROCEDURE — 99214 OFFICE O/P EST MOD 30 MIN: CPT | Performed by: SURGERY

## 2020-03-19 PROCEDURE — G8420 CALC BMI NORM PARAMETERS: HCPCS | Performed by: SURGERY

## 2020-03-19 PROCEDURE — G8484 FLU IMMUNIZE NO ADMIN: HCPCS | Performed by: SURGERY

## 2020-03-19 PROCEDURE — 4004F PT TOBACCO SCREEN RCVD TLK: CPT | Performed by: SURGERY

## 2020-03-19 PROCEDURE — 1123F ACP DISCUSS/DSCN MKR DOCD: CPT | Performed by: SURGERY

## 2020-03-19 ASSESSMENT — ENCOUNTER SYMPTOMS
SHORTNESS OF BREATH: 1
CHEST TIGHTNESS: 1
VOMITING: 1
NAUSEA: 1
BACK PAIN: 1
ALLERGIC/IMMUNOLOGIC NEGATIVE: 1

## 2020-03-19 NOTE — PROGRESS NOTES
[facility] GENERAL AND LAPAROSCOPIC SURGERY                       PATIENT NAME: Nicola Chakraborty DATE: 3/19/2020    Reason for Consult:  Hernia    Requesting Physician:  Dr. Epifanio Conrad:              The patient is a 71 y.o. male who presents with concerns of a hiatal hernia. Pt had severe chest and back pain last weekend. Had assoc N/V. No gross bleeding. Pt had ER visit and cardiac eval, was also carrying a heavy tray and has spine compression fx injury. Does complain of more longstanding reflux concern, regurgitates acid, foul tasting fluid on occasion, and has taken Rolaids at home to help. No prior known ulcer or EGD. Had colonoscopy with several polypectomies 4/2018 and was supposed to do follow uo in a year but has not done so.     Past Medical History:        Diagnosis Date    Acute encephalopathy 12/11/2017    Alcohol dependence ([address] 5/7/6053    Alcoholic liver disease ([address] 12/11/2017    Anemia 12/11/2017    Anxiety     Arthritis of carpometacarpal (CMC) joint of left thumb 9/14/2017    Arthritis of left hip 6/22/2017    Arthritis of left knee 6/22/2017    Chest tightness     Continuous visual hallucinations 12/11/2017    DDD (degenerative disc disease), lumbosacral 12/15/2014    Delirium due to known physiological condition     Delirium tremens ([address] 12/11/2017    Episode of syncope 10/9/2013    Erectile dysfunction     Generalized osteoarthritis of multiple sites     Hematoma of groin     Hiatal hernia     PER CXR    Hiatal hernia with GERD 3/17/2020    Hypertension     Hyponatremia 12/11/2017    Intractable migraine without aura     Localization-related (focal) (partial) 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 3/18/20 3/21/20 Yes ALEJA Petersen MD   hydrOXYzine (ATARAX) 25 MG tablet Take 1 tablet by mouth nightly as needed for Anxiety (insomnia) 3/6/20 4/5/20 Yes Jabari Reese MD   losartan (COZAAR) 50 MG tablet Take 1 tablet by mouth daily 3/6/20  Yes Jabari Reese MD   albuterol sulfate HFA (PROAIR HFA) 108 (90 Base) MCG/ACT inhaler INHALE TWO PUFFS BY MOUTH EVERY 6 HOURS AS NEEDED FOR WHEEZING 2/5/20  Yes Jabari Reese MD   budesonide-formoterol (SYMBICORT) 160-4.5 MCG/ACT AERO Inhale 2 puffs into the lungs 2 times daily 2/5/20  Yes Jabari Reese MD   Elastic Bandages & Supports (LUMBAR BACK BRACE/SUPPORT PAD) MISC 1 each by Does not apply route daily as needed (use as much as possible)  Patient not taking: Reported on 3/19/2020 3/18/20   Jabari Reese MD   amLODIPine (NORVASC) 2.5 MG tablet Take 1 tablet by mouth daily  Patient not taking: Reported on 3/19/2020 3/17/20   Jabari Reese MD   vitamin B-1 (THIAMINE) 100 MG tablet Take 1 tablet by mouth daily  Patient not taking: Reported on 3/19/2020 3/17/20   Jabari Reese MD   folic acid (FOLVITE) 1 MG tablet Take 1 tablet by mouth daily  Patient not taking: Reported on 3/19/2020 3/17/20   Jabari Reese MD   pantoprazole (PROTONIX) 20 MG tablet Take 1 tablet by mouth every morning (before breakfast)  Patient not taking: Reported on 3/19/2020 3/17/20   Jabari Reese MD   nicotine (Lindalou Monique) 21 MG/24HR Place 1 patch onto the skin daily 3/17/20 4/28/20  Jabari Reese MD        Allergies:  Propoxyphene    Social History:    reports that he has been smoking cigarettes. He started smoking about 51 years ago. He has a 55.00 pack-year smoking history. He has quit using smokeless tobacco. He reports current alcohol use of about 4.0 standard drinks of alcohol per week. He reports that he does not use drugs.     Family History:        Problem Relation Age of Onset    Heart Disease Father None   CT chest:   Lungs and Airways: The tracheobronchial tree is patent. Emphysematous changes are noted. There is no acute consolidation   Pleura: There are no pleural effusions. Heart and Great Vessels: The heart is mildly enlarged. Coronary artery calcification is visualized. The ascending thoracic aorta measures 3.5 x 3.5 cm in AP and transverse dimensions. The descending thoracic aorta measures 2.5 x 2.6 cm in AP and transverse dimensions. There is no evidence of dissection are not traumatic aortic injury. There is a normal three-vessel arch. .  The main pulmonary arteries enhance normally with contrast material.   Adenopathy: Calcified lymph nodes are identified in the right hilar region and subcarinal region. There is no lymphadenopathy visualized. Chest Wall / Lower Neck: No significant abnormality. Bones: There is mild anterior compression of the T8 vertebral body. Age is indeterminate   [IMPRESSION]   No evidence of aortic dissection, aneurysm are not traumatic aortic injury. Emphysematous changes   Coronary artery calcification       CT abdomen and pelvis:   Liver: There is diffuse fatty infiltration of the liver. No focal abnormality is identified on this exam.   Gallbladder and Biliary Tree: No definite calcified stones are seen. There is no ductal dilatation. Pancreas: Pancreas appears homogeneous in its enhancement. Spleen: The spleen is normal in size. Adrenal Glands: Normal.   Kidneys and Ureters: The kidneys concentrate contrast bilaterally. There is no hydronephrosis identified. There a few small hypodensities seen in both kidneys likely cysts. Urinary Bladder: Normal.   Bowel: There is no evidence of obstruction. There is a large hiatal hernia. There are scattered diverticuli in the colon but there is no wall thickening or inflammatory change. The appendix is visualized and normal.   Reproductive Organs: The prostate is mildly enlarged.    Lymph Nodes: No retroperitoneal or

## 2020-03-24 ENCOUNTER — HOSPITAL ENCOUNTER (OUTPATIENT)
Dept: GENERAL RADIOLOGY | Age: 65
Discharge: HOME | End: 2020-03-24
Payer: MEDICARE

## 2020-03-24 PROCEDURE — 74240 X-RAY XM UPR GI TRC 1CNTRST: CPT

## 2020-04-15 ENCOUNTER — TELEPHONE (OUTPATIENT)
Dept: INTERNAL MEDICINE CLINIC | Age: 65
End: 2020-04-15

## 2020-04-16 ENCOUNTER — VIRTUAL VISIT (OUTPATIENT)
Dept: INTERNAL MEDICINE CLINIC | Age: 65
End: 2020-04-16
Payer: MEDICARE

## 2020-04-16 PROBLEM — F33.42 RECURRENT MAJOR DEPRESSIVE DISORDER, IN FULL REMISSION (HCC): Status: ACTIVE | Noted: 2020-04-16

## 2020-04-16 PROCEDURE — 99442 PR PHYS/QHP TELEPHONE EVALUATION 11-20 MIN: CPT | Performed by: INTERNAL MEDICINE

## 2020-04-16 RX ORDER — THIAMINE MONONITRATE (VIT B1) 100 MG
100 TABLET ORAL DAILY
Qty: 30 TABLET | Refills: 3 | Status: SHIPPED | OUTPATIENT
Start: 2020-04-16 | End: 2020-06-15 | Stop reason: SDUPTHER

## 2020-04-16 RX ORDER — ACETAMINOPHEN AND CODEINE PHOSPHATE 300; 30 MG/1; MG/1
1 TABLET ORAL EVERY 8 HOURS PRN
Qty: 12 TABLET | Refills: 0 | Status: SHIPPED | OUTPATIENT
Start: 2020-04-16 | End: 2020-04-19

## 2020-04-16 RX ORDER — FOLIC ACID 1 MG/1
1 TABLET ORAL DAILY
Qty: 90 TABLET | Refills: 1 | Status: SHIPPED | OUTPATIENT
Start: 2020-04-16 | End: 2021-03-26

## 2020-04-16 RX ORDER — PANTOPRAZOLE SODIUM 20 MG/1
20 TABLET, DELAYED RELEASE ORAL
Qty: 90 TABLET | Refills: 1 | Status: SHIPPED | OUTPATIENT
Start: 2020-04-16 | End: 2021-02-26 | Stop reason: SDUPTHER

## 2020-04-16 RX ORDER — NICOTINE 21 MG/24HR
1 PATCH, TRANSDERMAL 24 HOURS TRANSDERMAL DAILY
Qty: 42 PATCH | Refills: 0 | Status: SHIPPED | OUTPATIENT
Start: 2020-04-16 | End: 2020-12-28

## 2020-04-16 RX ORDER — AMLODIPINE BESYLATE 2.5 MG/1
2.5 TABLET ORAL DAILY
Qty: 90 TABLET | Refills: 1 | Status: SHIPPED
Start: 2020-04-16 | End: 2020-06-15

## 2020-04-16 RX ORDER — LOSARTAN POTASSIUM 50 MG/1
50 TABLET ORAL DAILY
Qty: 90 TABLET | Refills: 1
Start: 2020-04-16 | End: 2020-09-08

## 2020-06-05 ENCOUNTER — HOSPITAL ENCOUNTER (EMERGENCY)
Age: 65
Discharge: LEFT BEFORE DISCHARGE | End: 2020-06-05
Payer: MEDICARE

## 2020-06-05 ENCOUNTER — APPOINTMENT (OUTPATIENT)
Dept: CT IMAGING | Age: 65
End: 2020-06-05
Payer: MEDICARE

## 2020-06-05 ENCOUNTER — NURSE TRIAGE (OUTPATIENT)
Dept: OTHER | Facility: CLINIC | Age: 65
End: 2020-06-05

## 2020-06-05 VITALS
RESPIRATION RATE: 18 BRPM | HEIGHT: 65 IN | HEART RATE: 81 BPM | SYSTOLIC BLOOD PRESSURE: 162 MMHG | DIASTOLIC BLOOD PRESSURE: 84 MMHG | OXYGEN SATURATION: 94 % | WEIGHT: 135 LBS | TEMPERATURE: 98.5 F | BODY MASS INDEX: 22.49 KG/M2

## 2020-06-05 ASSESSMENT — PAIN SCALES - GENERAL: PAINLEVEL_OUTOF10: 6

## 2020-06-12 ENCOUNTER — NURSE TRIAGE (OUTPATIENT)
Dept: OTHER | Facility: CLINIC | Age: 65
End: 2020-06-12

## 2020-06-12 ENCOUNTER — OFFICE VISIT (OUTPATIENT)
Dept: INTERNAL MEDICINE CLINIC | Age: 65
End: 2020-06-12
Payer: MEDICARE

## 2020-06-12 ENCOUNTER — HOSPITAL ENCOUNTER (OUTPATIENT)
Dept: CT IMAGING | Age: 65
Discharge: HOME | End: 2020-06-12
Payer: MEDICARE

## 2020-06-12 VITALS
DIASTOLIC BLOOD PRESSURE: 100 MMHG | SYSTOLIC BLOOD PRESSURE: 180 MMHG | HEART RATE: 90 BPM | WEIGHT: 135 LBS | BODY MASS INDEX: 22.47 KG/M2

## 2020-06-12 LAB
A/G RATIO: 2.4 (ref 1.1–2.2)
ALBUMIN SERPL-MCNC: 5 G/DL (ref 3.4–5)
ALP BLD-CCNC: 83 U/L (ref 40–129)
ALT SERPL-CCNC: 17 U/L (ref 10–40)
ANION GAP SERPL CALCULATED.3IONS-SCNC: 10 MMOL/L (ref 3–16)
AST SERPL-CCNC: 27 U/L (ref 15–37)
BILIRUB SERPL-MCNC: 1.1 MG/DL (ref 0–1)
BUN BLDV-MCNC: 6 MG/DL (ref 7–20)
CALCIUM SERPL-MCNC: 9.6 MG/DL (ref 8.3–10.6)
CHLORIDE BLD-SCNC: 95 MMOL/L (ref 99–110)
CO2: 30 MMOL/L (ref 21–32)
CREAT SERPL-MCNC: 0.6 MG/DL (ref 0.8–1.3)
GFR AFRICAN AMERICAN: >60
GFR NON-AFRICAN AMERICAN: >60
GLOBULIN: 2.1 G/DL
GLUCOSE BLD-MCNC: 111 MG/DL (ref 70–99)
HCT VFR BLD CALC: 49.5 % (ref 40.5–52.5)
HEMOGLOBIN: 16.4 G/DL (ref 13.5–17.5)
MCH RBC QN AUTO: 33.2 PG (ref 26–34)
MCHC RBC AUTO-ENTMCNC: 33 G/DL (ref 31–36)
MCV RBC AUTO: 100.4 FL (ref 80–100)
PDW BLD-RTO: 13.6 % (ref 12.4–15.4)
PLATELET # BLD: 208 K/UL (ref 135–450)
PMV BLD AUTO: 8.1 FL (ref 5–10.5)
POTASSIUM SERPL-SCNC: 4.3 MMOL/L (ref 3.5–5.1)
RBC # BLD: 4.94 M/UL (ref 4.2–5.9)
SODIUM BLD-SCNC: 135 MMOL/L (ref 136–145)
TOTAL PROTEIN: 7.1 G/DL (ref 6.4–8.2)
WBC # BLD: 6.6 K/UL (ref 4–11)

## 2020-06-12 PROCEDURE — 3017F COLORECTAL CA SCREEN DOC REV: CPT | Performed by: INTERNAL MEDICINE

## 2020-06-12 PROCEDURE — 99214 OFFICE O/P EST MOD 30 MIN: CPT | Performed by: INTERNAL MEDICINE

## 2020-06-12 PROCEDURE — 70450 CT HEAD/BRAIN W/O DYE: CPT

## 2020-06-12 PROCEDURE — G8420 CALC BMI NORM PARAMETERS: HCPCS | Performed by: INTERNAL MEDICINE

## 2020-06-12 PROCEDURE — 4004F PT TOBACCO SCREEN RCVD TLK: CPT | Performed by: INTERNAL MEDICINE

## 2020-06-12 PROCEDURE — 1123F ACP DISCUSS/DSCN MKR DOCD: CPT | Performed by: INTERNAL MEDICINE

## 2020-06-12 PROCEDURE — 4040F PNEUMOC VAC/ADMIN/RCVD: CPT | Performed by: INTERNAL MEDICINE

## 2020-06-12 PROCEDURE — G8427 DOCREV CUR MEDS BY ELIG CLIN: HCPCS | Performed by: INTERNAL MEDICINE

## 2020-06-12 PROCEDURE — 93000 ELECTROCARDIOGRAM COMPLETE: CPT | Performed by: INTERNAL MEDICINE

## 2020-06-12 RX ORDER — ASPIRIN 325 MG
325 TABLET, DELAYED RELEASE (ENTERIC COATED) ORAL DAILY
Qty: 90 TABLET | Refills: 0 | Status: SHIPPED | OUTPATIENT
Start: 2020-06-12 | End: 2021-01-21 | Stop reason: ALTCHOICE

## 2020-06-12 RX ORDER — HYDRALAZINE HYDROCHLORIDE 10 MG/1
10 TABLET, FILM COATED ORAL 3 TIMES DAILY
Qty: 90 TABLET | Refills: 0 | Status: SHIPPED | OUTPATIENT
Start: 2020-06-12 | End: 2020-06-15 | Stop reason: DRUGHIGH

## 2020-06-12 ASSESSMENT — ENCOUNTER SYMPTOMS
VOMITING: 0
VOICE CHANGE: 0
WHEEZING: 0
ABDOMINAL PAIN: 0
SHORTNESS OF BREATH: 0
NAUSEA: 0
TROUBLE SWALLOWING: 0
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
## 2020-06-12 NOTE — PATIENT INSTRUCTIONS
Up Now\" link. Current as of: March 4, 2020               Content Version: 12.5  © 7005-9429 [employer]. Care instructions adapted under license by [facility] [place] ([facility]). If you have questions about a medical condition or this instruction, always ask your healthcare professional. [address] any warranty or liability for your use of this information.

## 2020-06-15 ENCOUNTER — OFFICE VISIT (OUTPATIENT)
Dept: INTERNAL MEDICINE CLINIC | Age: 65
End: 2020-06-15
Payer: MEDICARE

## 2020-06-15 VITALS
DIASTOLIC BLOOD PRESSURE: 74 MMHG | TEMPERATURE: 98.4 F | HEIGHT: 65 IN | HEART RATE: 90 BPM | WEIGHT: 132 LBS | SYSTOLIC BLOOD PRESSURE: 132 MMHG | BODY MASS INDEX: 21.99 KG/M2

## 2020-06-15 PROCEDURE — 1123F ACP DISCUSS/DSCN MKR DOCD: CPT | Performed by: INTERNAL MEDICINE

## 2020-06-15 PROCEDURE — G8427 DOCREV CUR MEDS BY ELIG CLIN: HCPCS | Performed by: INTERNAL MEDICINE

## 2020-06-15 PROCEDURE — G8420 CALC BMI NORM PARAMETERS: HCPCS | Performed by: INTERNAL MEDICINE

## 2020-06-15 PROCEDURE — 99213 OFFICE O/P EST LOW 20 MIN: CPT | Performed by: INTERNAL MEDICINE

## 2020-06-15 PROCEDURE — 3017F COLORECTAL CA SCREEN DOC REV: CPT | Performed by: INTERNAL MEDICINE

## 2020-06-15 PROCEDURE — 4040F PNEUMOC VAC/ADMIN/RCVD: CPT | Performed by: INTERNAL MEDICINE

## 2020-06-15 PROCEDURE — 4004F PT TOBACCO SCREEN RCVD TLK: CPT | Performed by: INTERNAL MEDICINE

## 2020-06-15 RX ORDER — AMLODIPINE BESYLATE 5 MG/1
5 TABLET ORAL DAILY
Qty: 90 TABLET | Refills: 1 | Status: SHIPPED | OUTPATIENT
Start: 2020-06-15 | End: 2020-12-16

## 2020-06-15 RX ORDER — THIAMINE MONONITRATE (VIT B1) 100 MG
100 TABLET ORAL DAILY
Qty: 90 TABLET | Refills: 1 | Status: SHIPPED | OUTPATIENT
Start: 2020-06-15 | End: 2020-12-28 | Stop reason: ALTCHOICE

## 2020-06-15 RX ORDER — HYDRALAZINE HYDROCHLORIDE 10 MG/1
10 TABLET, FILM COATED ORAL 3 TIMES DAILY
Qty: 90 TABLET | Refills: 0 | Status: SHIPPED
Start: 2020-06-15 | End: 2020-07-08

## 2020-06-15 ASSESSMENT — ENCOUNTER SYMPTOMS
WHEEZING: 0
VOICE CHANGE: 0
VOMITING: 0
TROUBLE SWALLOWING: 0
SHORTNESS OF BREATH: 0
ABDOMINAL PAIN: 0
NAUSEA: 0
PHOTOPHOBIA: 0

## 2020-06-15 NOTE — PROGRESS NOTES
Amita Mendoza  1955  male  71 y.o. SUBJECTIVE:       Chief Complaint   Patient presents with    Hypertension     fluctuating. this morning 161/100, now 171/101--my reading 132/74       HPI:    Hypertension:    Amita Mendoza returns for follow up of hypertension. Tolerating medications well and taking them as directed. Does check BP at home. No symptoms (denies chest pain,dyspnea,edema or TIA's or blurred vision) concerning for end organ damage are present. Patient denies any exertional chest pain, dyspnea, palpitations, syncope, orthopnea, edema or paroxysmal nocturnal dyspnea. The patient denies any symptoms of  amaurosis, diplopia, dysphasia, or unilateral disturbance of motor or sensory function. No loss of balance or vertigo. He feels his numbness at the right face has been getting better.         Past Medical History:   Diagnosis Date    Acute encephalopathy 12/11/2017    Alcohol dependence ([address] 0/1/5703    Alcoholic liver disease ([address] 12/11/2017    Anemia 12/11/2017    Anxiety     Arthritis of carpometacarpal (CMC) joint of left thumb 9/14/2017    Arthritis of left hip 6/22/2017    Arthritis of left knee 6/22/2017    Chest tightness     Continuous visual hallucinations 12/11/2017    DDD (degenerative disc disease), lumbosacral 12/15/2014    Delirium due to known physiological condition     Delirium tremens ([address] 12/11/2017    Episode of syncope 10/9/2013    Erectile dysfunction     Generalized osteoarthritis of multiple sites     Hematoma of groin     Hiatal hernia     PER CXR    Hiatal hernia with GERD 3/17/2020    Hypertension     Hyponatremia 12/11/2017    Intractable migraine without aura     Localization-related (focal) (partial) epilepsy and epileptic syndromes with complex partial seizures, without mention of intractable epilepsy     LAST SEIZURE 4/2015    Lung nodule < 6cm on CT 4/15/2016    Migraine without aura, with intractable migraine, so stated, without

## 2020-06-23 ENCOUNTER — HOSPITAL ENCOUNTER (OUTPATIENT)
Age: 65
Discharge: HOME | End: 2020-06-23
Payer: MEDICARE

## 2020-06-23 ENCOUNTER — HOSPITAL ENCOUNTER (OUTPATIENT)
Dept: GENERAL RADIOLOGY | Age: 65
Discharge: HOME | End: 2020-06-23
Payer: MEDICARE

## 2020-06-23 PROCEDURE — 72100 X-RAY EXAM L-S SPINE 2/3 VWS: CPT

## 2020-06-23 PROCEDURE — 73502 X-RAY EXAM HIP UNI 2-3 VIEWS: CPT

## 2020-07-21 ENCOUNTER — HOSPITAL ENCOUNTER (OUTPATIENT)
Dept: MRI IMAGING | Age: 65
Discharge: HOME | End: 2020-07-21
Payer: MEDICARE

## 2020-07-21 PROCEDURE — 72148 MRI LUMBAR SPINE W/O DYE: CPT

## 2020-07-29 ENCOUNTER — OFFICE VISIT (OUTPATIENT)
Dept: INTERNAL MEDICINE CLINIC | Age: 65
End: 2020-07-29
Payer: MEDICARE

## 2020-07-29 VITALS
HEART RATE: 96 BPM | WEIGHT: 138 LBS | TEMPERATURE: 96.2 F | BODY MASS INDEX: 22.99 KG/M2 | DIASTOLIC BLOOD PRESSURE: 88 MMHG | SYSTOLIC BLOOD PRESSURE: 138 MMHG | HEIGHT: 65 IN

## 2020-07-29 PROCEDURE — 3017F COLORECTAL CA SCREEN DOC REV: CPT | Performed by: INTERNAL MEDICINE

## 2020-07-29 PROCEDURE — 4040F PNEUMOC VAC/ADMIN/RCVD: CPT | Performed by: INTERNAL MEDICINE

## 2020-07-29 PROCEDURE — 1123F ACP DISCUSS/DSCN MKR DOCD: CPT | Performed by: INTERNAL MEDICINE

## 2020-07-29 PROCEDURE — G8427 DOCREV CUR MEDS BY ELIG CLIN: HCPCS | Performed by: INTERNAL MEDICINE

## 2020-07-29 PROCEDURE — G8420 CALC BMI NORM PARAMETERS: HCPCS | Performed by: INTERNAL MEDICINE

## 2020-07-29 PROCEDURE — 3023F SPIROM DOC REV: CPT | Performed by: INTERNAL MEDICINE

## 2020-07-29 PROCEDURE — 99213 OFFICE O/P EST LOW 20 MIN: CPT | Performed by: INTERNAL MEDICINE

## 2020-07-29 PROCEDURE — 4004F PT TOBACCO SCREEN RCVD TLK: CPT | Performed by: INTERNAL MEDICINE

## 2020-07-29 PROCEDURE — G8926 SPIRO NO PERF OR DOC: HCPCS | Performed by: INTERNAL MEDICINE

## 2020-07-29 RX ORDER — GABAPENTIN 300 MG/1
300 CAPSULE ORAL 3 TIMES DAILY
COMMUNITY
End: 2021-02-26

## 2020-07-29 RX ORDER — ALBUTEROL SULFATE 90 UG/1
AEROSOL, METERED RESPIRATORY (INHALATION)
Qty: 3 INHALER | Refills: 1 | Status: SHIPPED | OUTPATIENT
Start: 2020-07-29 | End: 2021-07-30 | Stop reason: SDUPTHER

## 2020-07-29 RX ORDER — BENZONATATE 100 MG/1
200 CAPSULE ORAL 3 TIMES DAILY PRN
Qty: 90 CAPSULE | Refills: 0 | Status: SHIPPED | OUTPATIENT
Start: 2020-07-29 | End: 2020-08-28

## 2020-07-29 RX ORDER — CYCLOBENZAPRINE HCL 5 MG
5 TABLET ORAL 3 TIMES DAILY PRN
COMMUNITY
End: 2021-01-21

## 2020-07-29 ASSESSMENT — ENCOUNTER SYMPTOMS
SORE THROAT: 0
PHOTOPHOBIA: 0
WHEEZING: 0
NAUSEA: 0
COUGH: 1
VOMITING: 0
VOICE CHANGE: 0
ABDOMINAL PAIN: 0
SHORTNESS OF BREATH: 0
TROUBLE SWALLOWING: 0

## 2020-07-29 NOTE — PROGRESS NOTES
John Abdul  1955  male  71 y.o. SUBJECTIVE:       Chief Complaint   Patient presents with   Rosette Barber     at times    Other     seeing Dr. Aparna Zeng , nothing helping    Nicotine Dependence     asking for refill on symbicort and albuterol       HPI:  Interim visit. Patient has been complaining of mostly morning cough. He is a chronic smoker. He is not ready to quit a smoker though he have significantly reduced his smoking. He has been using Symbicort regularly. However does not use albuterol. He feels his shortness of breath wheezing has been stable. He had CT scan with chest about 4 months ago which failed to show any pulmonary masses or nodules other than underlying emphysema. Patient denies any sore throat, dysphagia. History of hiatal hernia, he is taking pantoprazole regularly.     Past Medical History:   Diagnosis Date    Acute encephalopathy 12/11/2017    Alcohol dependence ([address] 0/4/7523    Alcoholic liver disease ([address] 12/11/2017    Anemia 12/11/2017    Anxiety     Arthritis of carpometacarpal (CMC) joint of left thumb 9/14/2017    Arthritis of left hip 6/22/2017    Arthritis of left knee 6/22/2017    Chest tightness     Continuous visual hallucinations 12/11/2017    DDD (degenerative disc disease), lumbosacral 12/15/2014    Delirium due to known physiological condition     Delirium tremens ([address] 12/11/2017    Episode of syncope 10/9/2013    Erectile dysfunction     Generalized osteoarthritis of multiple sites     Hematoma of groin     Hiatal hernia     PER CXR    Hiatal hernia with GERD 3/17/2020    Hypertension     Hyponatremia 12/11/2017    Intractable migraine without aura     Localization-related (focal) (partial) epilepsy and epileptic syndromes with complex partial seizures, without mention of intractable epilepsy     LAST SEIZURE 4/2015    Lung nodule < 6cm on CT 4/15/2016    Migraine without aura, with intractable migraine, so stated, without mention of status migrainosus     Migraine without status migrainosus, not intractable 10/9/2013    Movement disorder     Myalgia     Nicotine dependence, cigarettes, uncomplicated 6/14/6706    Pancreatitis     Panlobular emphysema ([address] 3/29/2016    Partial epilepsy with impairment of consciousness ([address] 09/16/2019    PT STATES LAST SEIZURE WAS ABOUT 3 YEARS AGO    Partial epilepsy with impairment of consciousness, intractable ([address] 9/9/2013    Primary osteoarthritis of left hip 9/14/2017    Prostatic hypertrophy, benign     Recurrent inguinal hernia 4/21/2015    Recurrent left inguinal hernia     Restless leg syndrome 10/6/2015    S/P lumbar fusion 7/23/2014    Sacroiliac dysfunction 7/23/2014    Sacroiliac inflammation ([address] 6/6/2013    Sciatica 11/20/2013    Seizure ([address] 5/4/2015    Seizure disorder 4/30/2013    SI (sacroiliac) pain 12/13/2012    Syncope     Tobacco abuse disorder 2/21/2012    Trauma and stressor-related disorder 7/10/2017    Undescended left testicle 10/6/2015    Unspecified cerebral artery occlusion with cerebral infarction     Weight loss, abnormal      Past Surgical History:   Procedure Laterality Date    ANKLE SURGERY      left    BACK SURGERY      X2 RODS AND SCREWS    COLONOSCOPY  05/26/2020    Multiple polyps, diverticulosis, internal hemorrhoids-Next colonoscopy in 2023.  Dr Earmon Schilder Bilateral     CARTILAGE REPLACED IN WRIST    HERNIA REPAIR Left 05/14/2015    Inguinal, with mesh    HERNIA REPAIR Left 9/18/2019    OPEN LEFT INGUINAL HERNIA REPAIR WITH MESH performed by Danielle Quinones MD at [address] Left 10/25/2016    inguinal hernia repair with mesh    OTHER SURGICAL HISTORY  11/10/2016    INCISION AND DRAINAGE OF LEFT GROIN HEMATOMA    SPINE SURGERY      lower back    UPPER GASTROINTESTINAL ENDOSCOPY  05/26/2020    7 cm sliding hernia     Social History     Socioeconomic History    Marital status:      Spouse any worsening new concerning symptoms, he should call for sooner appointment. Tessalon Perles and albuterol as needed. Continue Symbicort  1. Panlobular emphysema ([address]    2. Pulmonary emphysema, unspecified emphysema type ([address]            No orders of the defined types were placed in this encounter. Current Outpatient Medications   Medication Sig Dispense Refill    cyclobenzaprine (FLEXERIL) 5 MG tablet Take 5 mg by mouth 3 times daily as needed for Muscle spasms      gabapentin (NEURONTIN) 300 MG capsule Take 300 mg by mouth 3 times daily.  albuterol sulfate HFA (PROAIR HFA) 108 (90 Base) MCG/ACT inhaler INHALE TWO PUFFS BY MOUTH EVERY 6 HOURS AS NEEDED FOR WHEEZING 3 Inhaler 1    benzonatate (TESSALON PERLES) 100 MG capsule Take 2 capsules by mouth 3 times daily as needed for Cough 90 capsule 0    hydrALAZINE (APRESOLINE) 10 MG tablet TAKE ONE TABLET BY MOUTH THREE TIMES A DAY 90 tablet 1    amLODIPine (NORVASC) 5 MG tablet Take 1 tablet by mouth daily 90 tablet 1    vitamin B-1 (THIAMINE) 100 MG tablet Take 1 tablet by mouth daily 90 tablet 1    pantoprazole (PROTONIX) 20 MG tablet Take 1 tablet by mouth every morning (before breakfast) 90 tablet 1    folic acid (FOLVITE) 1 MG tablet Take 1 tablet by mouth daily 90 tablet 1    losartan (COZAAR) 50 MG tablet Take 1 tablet by mouth daily 90 tablet 1    aspirin 325 MG EC tablet Take 1 tablet by mouth daily (Patient not taking: Reported on 7/29/2020) 90 tablet 0    nicotine (NICODERM CQ) 21 MG/24HR Place 1 patch onto the skin daily 42 patch 0    Elastic Bandages & Supports (LUMBAR BACK BRACE/SUPPORT PAD) MISC 1 each by Does not apply route daily as needed (use as much as possible) 1 each 0    budesonide-formoterol (SYMBICORT) 160-4.5 MCG/ACT AERO Inhale 2 puffs into the lungs 2 times daily 3 Inhaler 3     No current facility-administered medications for this visit. Keep appointment as scheduled in September.   An After Visit Summary was printed and given to the patient. Documentation was done using voice recognition dragon software. Every effort was made to ensure accuracy; however, inadvertent  Unintentional computerized transcription errors may be present.

## 2020-09-15 ENCOUNTER — OFFICE VISIT (OUTPATIENT)
Dept: INTERNAL MEDICINE CLINIC | Age: 65
End: 2020-09-15
Payer: MEDICARE

## 2020-09-15 VITALS
BODY MASS INDEX: 23.16 KG/M2 | SYSTOLIC BLOOD PRESSURE: 160 MMHG | HEIGHT: 65 IN | TEMPERATURE: 99 F | HEART RATE: 90 BPM | WEIGHT: 139 LBS | DIASTOLIC BLOOD PRESSURE: 94 MMHG

## 2020-09-15 PROCEDURE — 90694 VACC AIIV4 NO PRSRV 0.5ML IM: CPT | Performed by: INTERNAL MEDICINE

## 2020-09-15 PROCEDURE — G8420 CALC BMI NORM PARAMETERS: HCPCS | Performed by: INTERNAL MEDICINE

## 2020-09-15 PROCEDURE — 1123F ACP DISCUSS/DSCN MKR DOCD: CPT | Performed by: INTERNAL MEDICINE

## 2020-09-15 PROCEDURE — G8427 DOCREV CUR MEDS BY ELIG CLIN: HCPCS | Performed by: INTERNAL MEDICINE

## 2020-09-15 PROCEDURE — G0008 ADMIN INFLUENZA VIRUS VAC: HCPCS | Performed by: INTERNAL MEDICINE

## 2020-09-15 PROCEDURE — G8510 SCR DEP NEG, NO PLAN REQD: HCPCS | Performed by: INTERNAL MEDICINE

## 2020-09-15 PROCEDURE — 4004F PT TOBACCO SCREEN RCVD TLK: CPT | Performed by: INTERNAL MEDICINE

## 2020-09-15 PROCEDURE — 3017F COLORECTAL CA SCREEN DOC REV: CPT | Performed by: INTERNAL MEDICINE

## 2020-09-15 PROCEDURE — 3288F FALL RISK ASSESSMENT DOCD: CPT | Performed by: INTERNAL MEDICINE

## 2020-09-15 PROCEDURE — G8926 SPIRO NO PERF OR DOC: HCPCS | Performed by: INTERNAL MEDICINE

## 2020-09-15 PROCEDURE — 99214 OFFICE O/P EST MOD 30 MIN: CPT | Performed by: INTERNAL MEDICINE

## 2020-09-15 PROCEDURE — 4040F PNEUMOC VAC/ADMIN/RCVD: CPT | Performed by: INTERNAL MEDICINE

## 2020-09-15 PROCEDURE — 3023F SPIROM DOC REV: CPT | Performed by: INTERNAL MEDICINE

## 2020-09-15 RX ORDER — ATORVASTATIN CALCIUM 20 MG/1
20 TABLET, FILM COATED ORAL DAILY
Qty: 30 TABLET | Refills: 5 | Status: SHIPPED
Start: 2020-09-15 | End: 2021-03-26 | Stop reason: HOSPADM

## 2020-09-15 RX ORDER — LOSARTAN POTASSIUM 100 MG/1
100 TABLET ORAL DAILY
Qty: 90 TABLET | Refills: 1 | Status: SHIPPED | OUTPATIENT
Start: 2020-09-15 | End: 2021-03-29

## 2020-09-15 RX ORDER — VARENICLINE TARTRATE
KIT
Qty: 1 BOX | Refills: 0 | Status: SHIPPED
Start: 2020-09-15 | End: 2020-12-28

## 2020-09-15 ASSESSMENT — ENCOUNTER SYMPTOMS
NAUSEA: 0
VOMITING: 0
ABDOMINAL PAIN: 0
VOICE CHANGE: 0
WHEEZING: 0
SHORTNESS OF BREATH: 0
TROUBLE SWALLOWING: 0

## 2020-09-15 ASSESSMENT — PATIENT HEALTH QUESTIONNAIRE - PHQ9
SUM OF ALL RESPONSES TO PHQ QUESTIONS 1-9: 0
SUM OF ALL RESPONSES TO PHQ9 QUESTIONS 1 & 2: 0
SUM OF ALL RESPONSES TO PHQ QUESTIONS 1-9: 0
1. LITTLE INTEREST OR PLEASURE IN DOING THINGS: 0
2. FEELING DOWN, DEPRESSED OR HOPELESS: 0
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
## 2020-12-28 ENCOUNTER — OFFICE VISIT (OUTPATIENT)
Dept: INTERNAL MEDICINE CLINIC | Age: 65
End: 2020-12-28
Payer: MEDICARE

## 2020-12-28 VITALS
SYSTOLIC BLOOD PRESSURE: 128 MMHG | HEIGHT: 65 IN | BODY MASS INDEX: 22.99 KG/M2 | WEIGHT: 138 LBS | TEMPERATURE: 98.5 F | HEART RATE: 84 BPM | DIASTOLIC BLOOD PRESSURE: 88 MMHG

## 2020-12-28 PROCEDURE — 3023F SPIROM DOC REV: CPT | Performed by: INTERNAL MEDICINE

## 2020-12-28 PROCEDURE — G8926 SPIRO NO PERF OR DOC: HCPCS | Performed by: INTERNAL MEDICINE

## 2020-12-28 PROCEDURE — G0009 ADMIN PNEUMOCOCCAL VACCINE: HCPCS | Performed by: INTERNAL MEDICINE

## 2020-12-28 PROCEDURE — 4040F PNEUMOC VAC/ADMIN/RCVD: CPT | Performed by: INTERNAL MEDICINE

## 2020-12-28 PROCEDURE — 1123F ACP DISCUSS/DSCN MKR DOCD: CPT | Performed by: INTERNAL MEDICINE

## 2020-12-28 PROCEDURE — 4004F PT TOBACCO SCREEN RCVD TLK: CPT | Performed by: INTERNAL MEDICINE

## 2020-12-28 PROCEDURE — G8420 CALC BMI NORM PARAMETERS: HCPCS | Performed by: INTERNAL MEDICINE

## 2020-12-28 PROCEDURE — G8484 FLU IMMUNIZE NO ADMIN: HCPCS | Performed by: INTERNAL MEDICINE

## 2020-12-28 PROCEDURE — 3017F COLORECTAL CA SCREEN DOC REV: CPT | Performed by: INTERNAL MEDICINE

## 2020-12-28 PROCEDURE — G8427 DOCREV CUR MEDS BY ELIG CLIN: HCPCS | Performed by: INTERNAL MEDICINE

## 2020-12-28 PROCEDURE — 90732 PPSV23 VACC 2 YRS+ SUBQ/IM: CPT | Performed by: INTERNAL MEDICINE

## 2020-12-28 PROCEDURE — 99214 OFFICE O/P EST MOD 30 MIN: CPT | Performed by: INTERNAL MEDICINE

## 2020-12-28 ASSESSMENT — ENCOUNTER SYMPTOMS
ABDOMINAL PAIN: 0
WHEEZING: 0
TROUBLE SWALLOWING: 0
SHORTNESS OF BREATH: 0
NAUSEA: 0
PHOTOPHOBIA: 0
VOICE CHANGE: 0
VOMITING: 0

## 2020-12-28 ASSESSMENT — PATIENT HEALTH QUESTIONNAIRE - PHQ9
SUM OF ALL RESPONSES TO PHQ QUESTIONS 1-9: 0
SUM OF ALL RESPONSES TO PHQ9 QUESTIONS 1 & 2: 0
2. FEELING DOWN, DEPRESSED OR HOPELESS: 0
SUM OF ALL RESPONSES TO PHQ QUESTIONS 1-9: 0
1. LITTLE INTEREST OR PLEASURE IN DOING THINGS: 0
SUM OF ALL RESPONSES TO PHQ QUESTIONS 1-9: 0
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
## 2020-12-30 ENCOUNTER — VIRTUAL VISIT (OUTPATIENT)
Dept: INTERNAL MEDICINE CLINIC | Age: 65
End: 2020-12-30
Payer: MEDICARE

## 2020-12-30 PROCEDURE — G8484 FLU IMMUNIZE NO ADMIN: HCPCS | Performed by: INTERNAL MEDICINE

## 2020-12-30 PROCEDURE — G0438 PPPS, INITIAL VISIT: HCPCS | Performed by: INTERNAL MEDICINE

## 2020-12-30 PROCEDURE — 3017F COLORECTAL CA SCREEN DOC REV: CPT | Performed by: INTERNAL MEDICINE

## 2020-12-30 PROCEDURE — 1123F ACP DISCUSS/DSCN MKR DOCD: CPT | Performed by: INTERNAL MEDICINE

## 2020-12-30 PROCEDURE — 4040F PNEUMOC VAC/ADMIN/RCVD: CPT | Performed by: INTERNAL MEDICINE

## 2020-12-30 ASSESSMENT — PATIENT HEALTH QUESTIONNAIRE - PHQ9
1. LITTLE INTEREST OR PLEASURE IN DOING THINGS: 0
SUM OF ALL RESPONSES TO PHQ9 QUESTIONS 1 & 2: 0
SUM OF ALL RESPONSES TO PHQ QUESTIONS 1-9: 0
2. FEELING DOWN, DEPRESSED OR HOPELESS: 0

## 2020-12-30 ASSESSMENT — LIFESTYLE VARIABLES
HOW OFTEN DURING THE LAST YEAR HAVE YOU FAILED TO DO WHAT WAS NORMALLY EXPECTED FROM YOU BECAUSE OF DRINKING: 0
HOW OFTEN DURING THE LAST YEAR HAVE YOU HAD A FEELING OF GUILT OR REMORSE AFTER DRINKING: 0
HAVE YOU OR SOMEONE ELSE BEEN INJURED AS A RESULT OF YOUR DRINKING: 0
AUDIT-C TOTAL SCORE: 10
AUDIT TOTAL SCORE: 10
HOW OFTEN DO YOU HAVE SIX OR MORE DRINKS ON ONE OCCASION: 4
HAS A RELATIVE, FRIEND, DOCTOR, OR ANOTHER HEALTH PROFESSIONAL EXPRESSED CONCERN ABOUT YOUR DRINKING OR SUGGESTED YOU CUT DOWN: 0
HOW OFTEN DURING THE LAST YEAR HAVE YOU FOUND THAT YOU WERE NOT ABLE TO STOP DRINKING ONCE YOU HAD STARTED: 0
HOW MANY STANDARD DRINKS CONTAINING ALCOHOL DO YOU HAVE ON A TYPICAL DAY: 2
HOW OFTEN DURING THE LAST YEAR HAVE YOU NEEDED AN ALCOHOLIC DRINK FIRST THING IN THE MORNING TO GET YOURSELF GOING AFTER A NIGHT OF HEAVY DRINKING: 0
HOW OFTEN DO YOU HAVE A DRINK CONTAINING ALCOHOL: 4
HOW OFTEN DURING THE LAST YEAR HAVE YOU BEEN UNABLE TO REMEMBER WHAT HAPPENED THE NIGHT BEFORE BECAUSE YOU HAD BEEN DRINKING: 0

## 2020-12-30 NOTE — PROGRESS NOTES
Medicare Annual Wellness Visit  Name: Georgia Hazard Date: 2020   MRN: [id number] Sex: Male   Age: 76 y.o. Ethnicity: Non-/Non    : 1955 Race: Enid Thurman is here for Medicare AWV    Screenings for behavioral, psychosocial and functional/safety risks, and cognitive dysfunction are all negative except as indicated below. These results, as well as other patient data from the [address] form, are documented in Flowsheets linked to this Encounter. Allergies   Allergen Reactions    Propoxyphene Nausea Only         Prior to Visit Medications    Medication Sig Taking? Authorizing Provider   amLODIPine (NORVASC) 5 MG tablet TAKE ONE TABLET BY MOUTH DAILY  ALEJA June MD   atorvastatin (LIPITOR) 20 MG tablet Take 1 tablet by mouth daily  Moises Zurita MD   losartan (COZAAR) 100 MG tablet Take 1 tablet by mouth daily  ALEJA Mcdaniel MD   cyclobenzaprine (FLEXERIL) 5 MG tablet Take 5 mg by mouth 3 times daily as needed for Muscle spasms  Historical Provider, MD   gabapentin (NEURONTIN) 300 MG capsule Take 300 mg by mouth 3 times daily.   Historical Provider, MD   albuterol sulfate HFA (PROAIR HFA) 108 (90 Base) MCG/ACT inhaler INHALE TWO PUFFS BY MOUTH EVERY 6 HOURS AS NEEDED FOR WHEEZING  ALEJA June MD   hydrALAZINE (APRESOLINE) 10 MG tablet TAKE ONE TABLET BY MOUTH THREE TIMES A DAY  ALEJA June MD   aspirin 325 MG EC tablet Take 1 tablet by mouth daily  Moises Zurita MD   pantoprazole (PROTONIX) 20 MG tablet Take 1 tablet by mouth every morning (before breakfast)  Moises Zurita MD   folic acid (FOLVITE) 1 MG tablet Take 1 tablet by mouth daily  ALEJA Mcdaniel MD   Elastic Bandages & Supports (LUMBAR BACK BRACE/SUPPORT PAD) MISC 1 each by Does not apply route daily as needed (use as much as possible)  Moises Zurita MD  Sacroiliac dysfunction 7/23/2014    Sacroiliac inflammation ([facility] 6/6/2013    Sciatica 11/20/2013    Seizure ([address] 5/4/2015    Seizure disorder 4/30/2013    SI (sacroiliac) pain 12/13/2012    Syncope     Tobacco abuse disorder 2/21/2012    Trauma and stressor-related disorder 7/10/2017    Undescended left testicle 10/6/2015    Unspecified cerebral artery occlusion with cerebral infarction     Weight loss, abnormal        Past Surgical History:   Procedure Laterality Date    ANKLE SURGERY      left    BACK SURGERY      X2 RODS AND SCREWS    COLONOSCOPY  05/26/2020    Multiple polyps, diverticulosis, internal hemorrhoids-Next colonoscopy in 2023.  Dr Carl Austin Bilateral     CARTILAGE REPLACED IN WRIST    HERNIA REPAIR Left 05/14/2015    Inguinal, with mesh    HERNIA REPAIR Left 9/18/2019    OPEN LEFT INGUINAL HERNIA REPAIR WITH MESH performed by Randall Berg MD at [address] Left 10/25/2016    inguinal hernia repair with mesh    OTHER SURGICAL HISTORY  11/10/2016    INCISION AND DRAINAGE OF LEFT GROIN HEMATOMA    SPINE SURGERY      lower back    UPPER GASTROINTESTINAL ENDOSCOPY  05/26/2020    7 cm sliding hernia         Family History   Problem Relation Age of Onset    Heart Disease Father     High Blood Pressure Other     Heart Disease Other     Cancer Other     Stroke Other     Cancer Mother         lung--smoker    Substance Abuse Brother         heroin - led to unintentional overdose and death    Substance Abuse Son         heroin       CareTeam (Including outside providers/suppliers regularly involved in providing care):   Patient Care Team:  Ladan Kelley MD as PCP - General (Internal Medicine)  Jarrett Habermann, MD as PCP - Internal Medicine (Internal Medicine)  Ladan Kelley MD [facility] PCP - [facility]  Beckie Michaels RN as Registered Nurse (General Surgery)  Randall Berg MD as Consulting Physician (General Surgery) Meka Acosta MD (Internal Medicine)  Chandra Briones MD as Surgeon (General Surgery)    Wt Readings from Last 3 Encounters:   12/28/20 138 lb (62.6 kg)   09/15/20 139 lb (63 kg)   07/29/20 138 lb (62.6 kg)     There were no vitals filed for this visit. There is no height or weight on file to calculate BMI. Based upon direct observation of the patient, evaluation of cognition reveals remote memory intact, recent memory impaired. Patient's complete Health Risk Assessment and screening values have been reviewed and are found in Flowsheets. The following problems were reviewed today and where indicated follow up appointments were made and/or referrals ordered. Positive Risk Factor Screenings with Interventions:         Substance History:  Social History     Tobacco History     Smoking Status  Current Every Day Smoker Smoking Start Date  1/1/1969 Smoking Frequency  1 pack/day for 0.5 years (0.5 pk yrs) Smoking Tobacco Type  Cigarettes    Smokeless Tobacco Use  Former User    Tobacco Comment  using nicoderm          Alcohol History     Alcohol Use Status  Yes Drinks/Week  21 Cans of beer per week Amount  21.0 standard drinks of alcohol/wk          Drug Use     Drug Use Status  No          Sexual Activity     Sexually Active  Not Currently Partners  Female               Alcohol Screening: Audit-C Score: 10  Total Score: 10    A score of 8 or more is associated with harmful or hazardous drinking. A score of 13 or more in women, and 15 or more in men, is likely to indicate alcohol dependence. Substance Abuse Interventions:  · Alcohol misuse/dependence:  patient is not ready to change his/her alcohol consumption behavior at this time    General Health and ACP:  General  In general, how would you say your health is?: Good  In the past 7 days, have you experienced any of the following?  New or Increased Pain, New or Increased Fatigue, Loneliness, Social Isolation, Stress or Anger?: None of These Do you get the social and emotional support that you need?: Yes  Do you have a Living Will?: Yes  Advance Directives     Power of  Living Will ACP-Advance Directive ACP-Power of     Not on File Not on File Not on File Not on File      General Health Risk Interventions:  · No Living Will: Advance Care Planning addressed with patient today    Health Habits/Nutrition:  Health Habits/Nutrition  Do you exercise for at least 20 minutes 2-3 times per week?: (!) No  Have you lost any weight without trying in the past 3 months?: No  Do you eat fewer than 2 meals per day?: No  Have you seen a dentist within the past year?: (!) No     Health Habits/Nutrition Interventions:  · Inadequate physical activity:  patient is not ready to increase his/her physical activity level at this time  · Dental exam overdue:  patient declines dental evaluation; dentures       Personalized Preventive Plan   Current Health Maintenance Status  Immunization History   Administered Date(s) Administered    Influenza Virus Vaccine 11/11/2014, 10/06/2015    Influenza, Quadv, IM, PF (6 mo and older Fluzone, Flulaval, Fluarix, and 3 yrs and older Afluria) 10/25/2017    Influenza, Quadv, adjuvanted, 65 yrs +, IM, PF (Fluad) 09/15/2020    Pneumococcal Conjugate 13-valent ([id number]) 10/21/2019    Pneumococcal Polysaccharide ([id number]) 10/06/2015, 12/28/2020    Tdap (Boostrix, Adacel) 07/29/2015        Health Maintenance   Topic Date Due    Shingles Vaccine (1 of 2) 08/27/2004    Annual Wellness Visit (AWV)  08/28/2019    Lipid screen  10/28/2020    Potassium monitoring  06/12/2021    Creatinine monitoring  06/12/2021    Colon cancer screen colonoscopy  05/26/2023    DTaP/Tdap/Td vaccine (2 - Td) 07/29/2025    Flu vaccine  Completed    Pneumococcal 65+ years Vaccine  Completed    AAA screen  Completed    Hepatitis C screen  Completed    Hepatitis A vaccine  Aged Out    Hepatitis B vaccine  Aged Out  Hib vaccine  Aged Out    Meningococcal (ACWY) vaccine  Aged Out     Recommendations for Instacart Due: see orders and patient instructions/AVS.  . Recommended screening schedule for the next 5-10 years is provided to the patient in written form: see Patient Instructions/AVS.    Danielle PÉREZ RN, 12/30/2020, performed the documented evaluation under the direct supervision of the attending physician. Riri Vance is a 76 y.o. male being evaluated by a Virtual Visit (phone) encounter to address concerns as mentioned above. A caregiver was present when appropriate. Due to this being a TeleHealth encounter (During CXKGT-81 public health emergency), evaluation of the following organ systems was limited: Vitals/Constitutional/EENT/Resp/CV/GI//MS/Neuro/Skin/Heme-Lymph-Imm. Pursuant to the emergency declaration under the [address] [place] [address], [address] [place] [address] and the [employer] Act, this Virtual Visit was conducted with patient's (and/or legal guardian's) consent, to reduce the patient's risk of exposure to COVID-19 and provide necessary medical care. The patient (and/or legal guardian) has also been advised to contact this office for worsening conditions or problems, and seek emergency medical treatment and/or call 911 if deemed necessary. Patient identification was verified at the start of the visit: Yes    Total time spent for this encounter: 15 minutes    Services were provided through a video synchronous discussion virtually to substitute for in-person clinic visit. Patient and provider were located at their individual homes. --Tim Palmer MD on 12/30/2020 at 6:17 PM    An electronic signature was used to authenticate this note. This encounter was performed under myTim MDs, direct supervision, 12/30/2020.

## 2021-01-13 DIAGNOSIS — E78.5 DYSLIPIDEMIA: ICD-10-CM

## 2021-01-13 DIAGNOSIS — I10 ESSENTIAL HYPERTENSION: ICD-10-CM

## 2021-01-13 DIAGNOSIS — Z12.5 SCREENING FOR PROSTATE CANCER: ICD-10-CM

## 2021-01-13 LAB
ALBUMIN SERPL-MCNC: 5 G/DL (ref 3.4–5)
ALP BLD-CCNC: 78 U/L (ref 40–129)
ALT SERPL-CCNC: 43 U/L (ref 10–40)
ANION GAP SERPL CALCULATED.3IONS-SCNC: 12 MMOL/L (ref 3–16)
AST SERPL-CCNC: 44 U/L (ref 15–37)
BILIRUB SERPL-MCNC: 1 MG/DL (ref 0–1)
BILIRUBIN DIRECT: 0.3 MG/DL (ref 0–0.3)
BILIRUBIN, INDIRECT: 0.7 MG/DL (ref 0–1)
BUN BLDV-MCNC: 8 MG/DL (ref 7–20)
CALCIUM SERPL-MCNC: 10 MG/DL (ref 8.3–10.6)
CHLORIDE BLD-SCNC: 97 MMOL/L (ref 99–110)
CHOLESTEROL, FASTING: 179 MG/DL (ref 0–199)
CO2: 27 MMOL/L (ref 21–32)
CREAT SERPL-MCNC: 0.8 MG/DL (ref 0.8–1.3)
GFR AFRICAN AMERICAN: >60
GFR NON-AFRICAN AMERICAN: >60
GLUCOSE BLD-MCNC: 109 MG/DL (ref 70–99)
HDLC SERPL-MCNC: 119 MG/DL (ref 40–60)
LDL CHOLESTEROL CALCULATED: 50 MG/DL
POTASSIUM SERPL-SCNC: 4.6 MMOL/L (ref 3.5–5.1)
PROSTATE SPECIFIC ANTIGEN: 0.64 NG/ML (ref 0–4)
SODIUM BLD-SCNC: 136 MMOL/L (ref 136–145)
TOTAL PROTEIN: 7 G/DL (ref 6.4–8.2)
TRIGLYCERIDE, FASTING: 51 MG/DL (ref 0–150)
VLDLC SERPL CALC-MCNC: 10 MG/DL

## 2021-01-20 ENCOUNTER — NURSE TRIAGE (OUTPATIENT)
Dept: OTHER | Facility: CLINIC | Age: 66
End: 2021-01-20

## 2021-01-20 ENCOUNTER — TELEPHONE (OUTPATIENT)
Dept: INTERNAL MEDICINE CLINIC | Age: 66
End: 2021-01-20

## 2021-01-20 NOTE — TELEPHONE ENCOUNTER
Please see triage note. Spoke with triage nurse Marin Irvin. Patient needs appt for worsening cough, wheezing, and SOB. Patient was not on the line.

## 2021-01-20 NOTE — TELEPHONE ENCOUNTER
POD 1 Janelle Britt  Headaches, started one month ago  Shortness of breath, COPD  Cough  Symptoms have gotten worse the last two months  No home oxygen  Using inhaler without improvement      Reason for Disposition   [1] Increasing difficulty breathing AND [2] always has some difficulty breathing    Answer Assessment - Initial Assessment Questions  1. ONSET: \"When did the cough begin? \"       Has COPD and states that the cough has gotten worse the past two months  2. SEVERITY: \"How bad is the cough today? \"       moderate  3. RESPIRATORY DISTRESS: \"Describe your breathing. \"       Increased shortness of breath  4. FEVER: \"Do you have a fever? \" If so, ask: \"What is your temperature, how was it measured, and when did it start? \"      No fever  5. SPUTUM: \"Describe the color of your sputum\" (e.g., clear, white, yellow, green), \"Has there been any change recently? \"      clear  6. HEMOPTYSIS: \"Are you coughing up any blood? \" If so ask: \"How much, flecks, streaks, tablespoons, etc.? \"      No blood  7. CARDIAC HISTORY: \"Do you have any history of heart disease? \" (e.g., heart attack, congestive heart failure)       No cardiac history  8. LUNG HISTORY: \"Do you have any history of lung disease? \"  (e.g., pulmonary embolus, asthma, emphysema/COPD)      COPD  9. OTHER SYMPTOMS: \"Do you have any other symptoms? (e.g., runny nose, wheezing, chest pain)      wheezing  10. PREGNANCY: \"Is there any chance you are pregnant? \" \"When was your last menstrual period? \"        n/a  11. TRAVEL: \"Have you traveled out of the country in the last month? \" (e.g., travel history, exposures)        no    Protocols used: COUGH - CHRONIC-ADULT-AH    Caller triaged, care advice provided, caller verbalized understanding. Caller placed on hold while triage nurse contacts PCP office for further assistance. Triage nurse spoke with [place], [place]. Caller disconnected the call, Pako states that she will call the patient back at this time.      Please do not

## 2021-01-20 NOTE — TELEPHONE ENCOUNTER
During his last visit about 3 weeks ago, he did not have worsening cough shortness of breath or wheezing. I recommend virtual visit.   He should get a COVID-19 test

## 2021-01-20 NOTE — TELEPHONE ENCOUNTER
[place] [facility]--visit made for tomorrow--phone only

## 2021-01-21 ENCOUNTER — VIRTUAL VISIT (OUTPATIENT)
Dept: INTERNAL MEDICINE CLINIC | Age: 66
End: 2021-01-21
Payer: MEDICARE

## 2021-01-21 DIAGNOSIS — F17.219 CIGARETTE NICOTINE DEPENDENCE WITH NICOTINE-INDUCED DISORDER: ICD-10-CM

## 2021-01-21 DIAGNOSIS — J43.9 PULMONARY EMPHYSEMA, UNSPECIFIED EMPHYSEMA TYPE (HCC): ICD-10-CM

## 2021-01-21 DIAGNOSIS — J43.1 PANLOBULAR EMPHYSEMA (HCC): Chronic | ICD-10-CM

## 2021-01-21 DIAGNOSIS — Z20.822 SUSPECTED COVID-19 VIRUS INFECTION: ICD-10-CM

## 2021-01-21 DIAGNOSIS — J40 BRONCHITIS: Primary | ICD-10-CM

## 2021-01-21 PROCEDURE — 99442 PR PHYS/QHP TELEPHONE EVALUATION 11-20 MIN: CPT | Performed by: INTERNAL MEDICINE

## 2021-01-21 RX ORDER — BUDESONIDE AND FORMOTEROL FUMARATE DIHYDRATE 160; 4.5 UG/1; UG/1
2 AEROSOL RESPIRATORY (INHALATION) 2 TIMES DAILY
Qty: 3 INHALER | Refills: 3 | Status: SHIPPED
Start: 2021-01-21 | End: 2021-01-26 | Stop reason: RX

## 2021-01-21 RX ORDER — PREDNISONE 20 MG/1
20 TABLET ORAL 2 TIMES DAILY
Qty: 10 TABLET | Refills: 0 | Status: SHIPPED | OUTPATIENT
Start: 2021-01-21 | End: 2021-01-26

## 2021-01-21 RX ORDER — DOXYCYCLINE HYCLATE 100 MG
100 TABLET ORAL 2 TIMES DAILY
Qty: 20 TABLET | Refills: 0 | Status: SHIPPED | OUTPATIENT
Start: 2021-01-21 | End: 2021-01-31

## 2021-01-21 ASSESSMENT — ENCOUNTER SYMPTOMS
HEMOPTYSIS: 0
COUGH: 1
SHORTNESS OF BREATH: 1
SORE THROAT: 0

## 2021-01-21 NOTE — PROGRESS NOTES
Kaur Young is a 76 y.o. male evaluated via telephone on 1/21/2021. Consent:  He and/or health care decision maker is aware that that he may receive a bill for this telephone service, depending on his insurance coverage, and has provided verbal consent to proceed: Yes      Documentation:  Cough  This is a chronic problem. Episode onset: worsening for 3 weeks. The problem has been gradually worsening. The cough is productive of sputum. Associated symptoms include headaches and shortness of breath. Pertinent negatives include no chest pain, chills, ear congestion, hemoptysis, sore throat, sweats or weight loss. Associated symptoms comments: Had nausea and diarrhea for 3 days which has been resolved. . Risk factors for lung disease include smoking/tobacco exposure. He has tried a beta-agonist inhaler (not taking symbicort at present) for the symptoms. The treatment provided mild relief. His past medical history is significant for bronchitis and COPD. Patient had Covid 19 test done in [facility] this morning  +ve smoker  Patient denies any exertional chest pain, palpitations, syncope, orthopnea, edema or paroxysmal nocturnal dyspnea.     Patient does not appears to be in any distress over the phone  Allergies   Allergen Reactions    Propoxyphene Nausea Only     Past Medical History:   Diagnosis Date    Acute encephalopathy 12/11/2017    Alcohol dependence ([address] 5/3/2623    Alcoholic liver disease ([address] 12/11/2017    Anemia 12/11/2017    Anxiety     Arthritis of carpometacarpal (CMC) joint of left thumb 9/14/2017    Arthritis of left hip 6/22/2017    Arthritis of left knee 6/22/2017    Chest tightness     Continuous visual hallucinations 12/11/2017    DDD (degenerative disc disease), lumbosacral 12/15/2014    Delirium due to known physiological condition     Delirium tremens ([address] 12/11/2017    Episode of syncope 10/9/2013    Erectile dysfunction  Generalized osteoarthritis of multiple sites     Hematoma of groin     Hiatal hernia     PER CXR    Hiatal hernia with GERD 3/17/2020    Hypertension     Hyponatremia 12/11/2017    Intractable migraine without aura     Localization-related (focal) (partial) epilepsy and epileptic syndromes with complex partial seizures, without mention of intractable epilepsy     LAST SEIZURE 4/2015    Lung nodule < 6cm on CT 4/15/2016    Migraine without aura, with intractable migraine, so stated, without mention of status migrainosus     Migraine without status migrainosus, not intractable 10/9/2013    Movement disorder     Myalgia     Nicotine dependence, cigarettes, uncomplicated 1/90/6344    Pancreatitis     Panlobular emphysema ([address] 3/29/2016    Partial epilepsy with impairment of consciousness ([address] 09/16/2019    PT STATES LAST SEIZURE WAS ABOUT 3 YEARS AGO    Partial epilepsy with impairment of consciousness, intractable ([address] 9/9/2013    Primary osteoarthritis of left hip 9/14/2017    Prostatic hypertrophy, benign     Recurrent inguinal hernia 4/21/2015    Recurrent left inguinal hernia     Restless leg syndrome 10/6/2015    S/P lumbar fusion 7/23/2014    Sacroiliac dysfunction 7/23/2014    Sacroiliac inflammation ([address] 6/6/2013    Sciatica 11/20/2013    Seizure ([address] 5/4/2015    Seizure disorder 4/30/2013    SI (sacroiliac) pain 12/13/2012    Syncope     Tobacco abuse disorder 2/21/2012    Trauma and stressor-related disorder 7/10/2017    Undescended left testicle 10/6/2015    Unspecified cerebral artery occlusion with cerebral infarction     Weight loss, abnormal        Assessment/Plan:   Perla Hicks was seen today for concern for covid-19, cough and nicotine dependence. Diagnoses and all orders for this visit:    Bronchitis  -     predniSONE (DELTASONE) 20 MG tablet;  Take 1 tablet by mouth 2 times daily for 5 days -     doxycycline hyclate (VIBRA-TABS) 100 MG tablet; Take 1 tablet by mouth 2 times daily for 10 days    Panlobular emphysema ([address]  restart-     budesonide-formoterol (SYMBICORT) 160-4.5 MCG/ACT AERO; Inhale 2 puffs into the lungs 2 times daily    Pulmonary emphysema, unspecified emphysema type (HCC)  -     budesonide-formoterol (SYMBICORT) 160-4.5 MCG/ACT AERO; Inhale 2 puffs into the lungs 2 times daily    Cigarette nicotine dependence with nicotine-induced disorder  counselled  Suspected COVID-19 virus infection  Pending result  He is let me know. F/u visit in a week              I affirm this is a Patient Initiated Episode with a Patient who has not had a related appointment within my department in the past 7 days or scheduled within the next 24 hours.     Patient identification was verified at the start of the visit: Yes    Total Time: minutes: 11-20 minutes    Note: not billable if this call serves to triage the patient into an appointment for the relevant concern      Geoffrey Christopher

## 2021-01-25 ENCOUNTER — TELEPHONE (OUTPATIENT)
Dept: INTERNAL MEDICINE CLINIC | Age: 66
End: 2021-01-25

## 2021-01-26 ENCOUNTER — TELEPHONE (OUTPATIENT)
Dept: INTERNAL MEDICINE CLINIC | Age: 66
End: 2021-01-26

## 2021-01-26 DIAGNOSIS — J43.9 PULMONARY EMPHYSEMA, UNSPECIFIED EMPHYSEMA TYPE (HCC): Primary | ICD-10-CM

## 2021-02-22 ENCOUNTER — NURSE TRIAGE (OUTPATIENT)
Dept: OTHER | Facility: CLINIC | Age: 66
End: 2021-02-22

## 2021-02-22 ENCOUNTER — TELEPHONE (OUTPATIENT)
Dept: INTERNAL MEDICINE CLINIC | Age: 66
End: 2021-02-22

## 2021-02-22 NOTE — TELEPHONE ENCOUNTER
Pt transferred from call center and nurse triage. Please see notes. Pain in bilateral lower legs when trying to sleep. No heat, no edema, has been ongoing for several months. Offered appt with NP sooner. Pt declined and preferred Dr. Elisa Hilliard.  First appt available this Friday 2/26. Pt scheduled.       Please contact pt if you would like to see him sooner

## 2021-02-22 NOTE — TELEPHONE ENCOUNTER
Patient called [place] at [facility])  with red flag complaint. Brief description of triage: bilateral lower leg cramping x 3-4 months, leaves legs sore. Triage indicates for patient to be seen today or tomorrow. Care advice provided, patient verbalizes understanding; denies any other questions or concerns; instructed to call back for any new or worsening symptoms. Writer provided warm transfer to [facility] at [facility] [place] for appointment scheduling. Attention Provider: Thank you for allowing me to participate in the care of your patient. The patient was connected to triage in response to information provided to the [facility]. Please do not respond through this encounter as the response is not directed to a shared pool. Reason for Disposition   Patient wants to be seen    Answer Assessment - Initial Assessment Questions  1. ONSET: \"When did the pain start? \"     4-5 months  2. LOCATION: \"Where is the pain located? \"      Bilateral lower leg cramping  3. PAIN: \"How bad is the pain? \"    (Scale 1-10; or mild, moderate, severe)    -  MILD (1-3): doesn't interfere with normal activities     -  MODERATE (4-7): interferes with normal activities (e.g., work or school) or awakens from sleep, limping     -  SEVERE (8-10): excruciating pain, unable to do any normal activities, unable to walk   sore during the day from cramping. 4. WORK OR EXERCISE: \"Has there been any recent work or exercise that involved this part of the body?\"       5. CAUSE: \"What do you think is causing the leg pain? \"  Unsure, reports back/disc injurys. 6. OTHER SYMPTOMS: \"Do you have any other symptoms? \" (e.g., chest pain, back pain, breathing difficulty, swelling, rash, fever, numbness, weakness)    7. PREGNANCY: \"Is there any chance you are pregnant? \" \"When was your last menstrual period? \"   NA    Protocols used: LEG PAIN-ADULT-[place]

## 2021-02-26 ENCOUNTER — OFFICE VISIT (OUTPATIENT)
Dept: INTERNAL MEDICINE CLINIC | Age: 66
End: 2021-02-26
Payer: MEDICARE

## 2021-02-26 VITALS
WEIGHT: 137 LBS | DIASTOLIC BLOOD PRESSURE: 82 MMHG | TEMPERATURE: 97.9 F | HEIGHT: 65 IN | SYSTOLIC BLOOD PRESSURE: 128 MMHG | BODY MASS INDEX: 22.82 KG/M2 | HEART RATE: 108 BPM

## 2021-02-26 DIAGNOSIS — G89.29 CHRONIC LEFT-SIDED LOW BACK PAIN WITH LEFT-SIDED SCIATICA: ICD-10-CM

## 2021-02-26 DIAGNOSIS — R25.2 LEG CRAMP: Primary | ICD-10-CM

## 2021-02-26 DIAGNOSIS — K21.9 HIATAL HERNIA WITH GERD: ICD-10-CM

## 2021-02-26 DIAGNOSIS — M54.42 CHRONIC LEFT-SIDED LOW BACK PAIN WITH LEFT-SIDED SCIATICA: ICD-10-CM

## 2021-02-26 DIAGNOSIS — K44.9 HIATAL HERNIA WITH GERD: ICD-10-CM

## 2021-02-26 DIAGNOSIS — R25.2 LEG CRAMP: ICD-10-CM

## 2021-02-26 LAB
ANION GAP SERPL CALCULATED.3IONS-SCNC: 14 MMOL/L (ref 3–16)
BUN BLDV-MCNC: 5 MG/DL (ref 7–20)
CALCIUM SERPL-MCNC: 9.9 MG/DL (ref 8.3–10.6)
CHLORIDE BLD-SCNC: 96 MMOL/L (ref 99–110)
CO2: 24 MMOL/L (ref 21–32)
CREAT SERPL-MCNC: 0.7 MG/DL (ref 0.8–1.3)
GFR AFRICAN AMERICAN: >60
GFR NON-AFRICAN AMERICAN: >60
GLUCOSE BLD-MCNC: 99 MG/DL (ref 70–99)
MAGNESIUM: 2.3 MG/DL (ref 1.8–2.4)
POTASSIUM SERPL-SCNC: 5.2 MMOL/L (ref 3.5–5.1)
SODIUM BLD-SCNC: 134 MMOL/L (ref 136–145)
TOTAL CK: 131 U/L (ref 39–308)

## 2021-02-26 PROCEDURE — 4004F PT TOBACCO SCREEN RCVD TLK: CPT | Performed by: INTERNAL MEDICINE

## 2021-02-26 PROCEDURE — G8427 DOCREV CUR MEDS BY ELIG CLIN: HCPCS | Performed by: INTERNAL MEDICINE

## 2021-02-26 PROCEDURE — 99213 OFFICE O/P EST LOW 20 MIN: CPT | Performed by: INTERNAL MEDICINE

## 2021-02-26 PROCEDURE — 3017F COLORECTAL CA SCREEN DOC REV: CPT | Performed by: INTERNAL MEDICINE

## 2021-02-26 PROCEDURE — G8420 CALC BMI NORM PARAMETERS: HCPCS | Performed by: INTERNAL MEDICINE

## 2021-02-26 PROCEDURE — G8484 FLU IMMUNIZE NO ADMIN: HCPCS | Performed by: INTERNAL MEDICINE

## 2021-02-26 PROCEDURE — 4040F PNEUMOC VAC/ADMIN/RCVD: CPT | Performed by: INTERNAL MEDICINE

## 2021-02-26 PROCEDURE — 1123F ACP DISCUSS/DSCN MKR DOCD: CPT | Performed by: INTERNAL MEDICINE

## 2021-02-26 RX ORDER — PANTOPRAZOLE SODIUM 20 MG/1
20 TABLET, DELAYED RELEASE ORAL
Qty: 90 TABLET | Refills: 1 | Status: SHIPPED | OUTPATIENT
Start: 2021-02-26 | End: 2021-09-08 | Stop reason: ALTCHOICE

## 2021-02-26 RX ORDER — GABAPENTIN 300 MG/1
300 CAPSULE ORAL NIGHTLY
Qty: 30 CAPSULE | Refills: 0 | Status: SHIPPED | OUTPATIENT
Start: 2021-02-26 | End: 2021-03-26 | Stop reason: DRUGHIGH

## 2021-02-26 RX ORDER — TIZANIDINE 2 MG/1
2 TABLET ORAL EVERY 8 HOURS PRN
Qty: 30 TABLET | Refills: 0 | Status: SHIPPED | OUTPATIENT
Start: 2021-02-26 | End: 2021-04-07 | Stop reason: SDUPTHER

## 2021-02-26 ASSESSMENT — ENCOUNTER SYMPTOMS
WHEEZING: 0
SHORTNESS OF BREATH: 0
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
## 2021-03-26 ENCOUNTER — OFFICE VISIT (OUTPATIENT)
Dept: INTERNAL MEDICINE CLINIC | Age: 66
End: 2021-03-26
Payer: MEDICARE

## 2021-03-26 VITALS
OXYGEN SATURATION: 98 % | WEIGHT: 138 LBS | HEART RATE: 74 BPM | BODY MASS INDEX: 22.96 KG/M2 | DIASTOLIC BLOOD PRESSURE: 84 MMHG | SYSTOLIC BLOOD PRESSURE: 130 MMHG | TEMPERATURE: 97.4 F

## 2021-03-26 DIAGNOSIS — G89.29 CHRONIC LEFT-SIDED LOW BACK PAIN WITH LEFT-SIDED SCIATICA: ICD-10-CM

## 2021-03-26 DIAGNOSIS — M54.42 CHRONIC LEFT-SIDED LOW BACK PAIN WITH LEFT-SIDED SCIATICA: ICD-10-CM

## 2021-03-26 DIAGNOSIS — R25.2 LEG CRAMP: ICD-10-CM

## 2021-03-26 PROCEDURE — G8420 CALC BMI NORM PARAMETERS: HCPCS | Performed by: INTERNAL MEDICINE

## 2021-03-26 PROCEDURE — 3017F COLORECTAL CA SCREEN DOC REV: CPT | Performed by: INTERNAL MEDICINE

## 2021-03-26 PROCEDURE — G8427 DOCREV CUR MEDS BY ELIG CLIN: HCPCS | Performed by: INTERNAL MEDICINE

## 2021-03-26 PROCEDURE — 4040F PNEUMOC VAC/ADMIN/RCVD: CPT | Performed by: INTERNAL MEDICINE

## 2021-03-26 PROCEDURE — 1123F ACP DISCUSS/DSCN MKR DOCD: CPT | Performed by: INTERNAL MEDICINE

## 2021-03-26 PROCEDURE — 4004F PT TOBACCO SCREEN RCVD TLK: CPT | Performed by: INTERNAL MEDICINE

## 2021-03-26 PROCEDURE — 99213 OFFICE O/P EST LOW 20 MIN: CPT | Performed by: INTERNAL MEDICINE

## 2021-03-26 PROCEDURE — G8484 FLU IMMUNIZE NO ADMIN: HCPCS | Performed by: INTERNAL MEDICINE

## 2021-03-26 RX ORDER — GABAPENTIN 300 MG/1
600 CAPSULE ORAL NIGHTLY
Qty: 60 CAPSULE | Refills: 2 | Status: SHIPPED | OUTPATIENT
Start: 2021-03-26 | End: 2021-03-26 | Stop reason: SDUPTHER

## 2021-03-26 RX ORDER — GABAPENTIN 300 MG/1
600 CAPSULE ORAL NIGHTLY
Qty: 60 CAPSULE | Refills: 2 | Status: SHIPPED | OUTPATIENT
Start: 2021-03-26 | End: 2021-06-14

## 2021-03-26 RX ORDER — ROPINIROLE 0.25 MG/1
TABLET, FILM COATED ORAL
Qty: 90 TABLET | Refills: 3 | Status: SHIPPED | OUTPATIENT
Start: 2021-03-26 | End: 2021-06-14

## 2021-03-26 ASSESSMENT — ENCOUNTER SYMPTOMS
SHORTNESS OF BREATH: 0
WHEEZING: 0
VOICE CHANGE: 0

## 2021-03-26 NOTE — PROGRESS NOTES
Isaac Edmond  1955  male  76 y.o. SUBJECTIVE:       Chief Complaint   Patient presents with    Leg Pain     leg cramps have been worse       HPI:  Follow-up visit. Patient continues to complain of bilateral leg cramps mostly at night. He does not get any leg pain on exertion or activities. No intermittent claudication. Current medication gabapentin and Zanaflex has not been helping. He reduced drinking alcohol significantly. He report drinking once a week only.       Past Medical History:   Diagnosis Date    Acute encephalopathy 12/11/2017    Alcohol dependence ([address] 7/1/8818    Alcoholic liver disease ([address] 12/11/2017    Anemia 12/11/2017    Anxiety     Arthritis of carpometacarpal (CMC) joint of left thumb 9/14/2017    Arthritis of left hip 6/22/2017    Arthritis of left knee 6/22/2017    Chest tightness     Continuous visual hallucinations 12/11/2017    DDD (degenerative disc disease), lumbosacral 12/15/2014    Delirium due to known physiological condition     Delirium tremens ([address] 12/11/2017    Episode of syncope 10/9/2013    Erectile dysfunction     Generalized osteoarthritis of multiple sites     Hematoma of groin     Hiatal hernia     PER CXR    Hiatal hernia with GERD 3/17/2020    Hypertension     Hyponatremia 12/11/2017    Intractable migraine without aura     Localization-related (focal) (partial) epilepsy and epileptic syndromes with complex partial seizures, without mention of intractable epilepsy     LAST SEIZURE 4/2015    Lung nodule < 6cm on CT 4/15/2016    Migraine without aura, with intractable migraine, so stated, without mention of status migrainosus     Migraine without status migrainosus, not intractable 10/9/2013    Movement disorder     Myalgia     Nicotine dependence, cigarettes, uncomplicated 3/87/5762    Pancreatitis     Panlobular emphysema ([address] 3/29/2016    Partial epilepsy with impairment of consciousness ([address] 09/16/2019    PT STATES in early adulthood     Family History   Problem Relation Age of Onset    Heart Disease Father     High Blood Pressure Other     Heart Disease Other     Cancer Other     Stroke Other     Cancer Mother         lung--smoker    Substance Abuse Brother         heroin - led to unintentional overdose and death    Substance Abuse Son         heroin       Review of Systems   HENT: Negative for voice change. Respiratory: Negative for shortness of breath and wheezing. Cardiovascular: Negative for chest pain, palpitations and leg swelling. Neurological: Negative for dizziness and light-headedness. OBJECTIVE:  Pulse Readings from Last 4 Encounters:   03/26/21 74   02/26/21 108   12/28/20 84   09/15/20 90     Wt Readings from Last 4 Encounters:   03/26/21 138 lb (62.6 kg)   02/26/21 137 lb (62.1 kg)   12/28/20 138 lb (62.6 kg)   09/15/20 139 lb (63 kg)     BP Readings from Last 4 Encounters:   03/26/21 130/84   02/26/21 128/82   12/28/20 128/88   09/15/20 (!) 160/94     Physical Exam  Vitals signs and nursing note reviewed. Eyes:      Conjunctiva/sclera: Conjunctivae normal.   Cardiovascular:      Rate and Rhythm: Normal rate and regular rhythm. Pulses: Normal pulses. Heart sounds: Normal heart sounds. Pulmonary:      Effort: Pulmonary effort is normal.      Breath sounds: Normal breath sounds. Abdominal:      General: Bowel sounds are normal.   Musculoskeletal:      Right lower leg: No edema. Left lower leg: No edema.          CBC:   Lab Results   Component Value Date    WBC 6.6 06/12/2020    HGB 16.4 06/12/2020    HCT 49.5 06/12/2020     06/12/2020     CMP:  Lab Results   Component Value Date     02/26/2021    K 5.2 02/26/2021    CL 96 02/26/2021    CO2 24 02/26/2021    ANIONGAP 14 02/26/2021    GLUCOSE 99 02/26/2021    BUN 5 02/26/2021    CREATININE 0.7 02/26/2021    GFRAA >60 02/26/2021    GFRAA >60 10/08/2012    CALCIUM 9.9 02/26/2021    PROT 7.0 01/13/2021    PROT 7.0 10/08/2012    LABALBU 5.0 01/13/2021    AGRATIO 2.4 06/12/2020    BILITOT 1.0 01/13/2021    ALKPHOS 78 01/13/2021    ALT 43 01/13/2021    AST 44 01/13/2021    GLOB 2.1 06/12/2020     URINALYSIS:  Lab Results   Component Value Date    GLUCOSEU Negative 12/11/2017    KETUA 15 12/11/2017    SPECGRAV 1.017 12/11/2017    BLOODU Negative 12/11/2017    PHUR 6.0 12/11/2017    PHUR 6.0 12/11/2017    PROTEINU TRACE 12/11/2017    NITRU Negative 12/11/2017    LEUKOCYTESUR Negative 12/11/2017    LABMICR YES 12/11/2017    URINETYPE Not Specified 12/11/2017     HBA1C:   Lab Results   Component Value Date    LABA1C 5.4 10/08/2012    .3 10/08/2012     MICRO/ALB:   Lab Results   Component Value Date    LABMICR YES 12/11/2017    LABCREA 101.7 12/11/2017     LIPID:  Lab Results   Component Value Date    CHOL 200 02/28/2018    TRIG 128 02/28/2018     01/13/2021    HDL 68 06/06/2011    LDLCALC 50 01/13/2021    LABVLDL 10 01/13/2021     TSH:   Lab Results   Component Value Date    TSHREFLEX 1.09 10/28/2019     PSA:   Lab Results   Component Value Date    PSA 0.64 01/13/2021        ASSESSMENT/PLAN:  Assessment/Plan:  Reji Martínez was seen today for leg pain. Diagnoses and all orders for this visit:    Leg cramp  add-     rOPINIRole (REQUIP) 0.25 MG tablet; 1-2 tab po qhs  -     gabapentin (NEURONTIN) 300 MG capsule; Take 2 capsules by mouth nightly for 60 days. We will discontinue Zanaflex. Patient to give us feedback in 2 weeks about the response to new treatment. He will no longer take Lipitor. No orders of the defined types were placed in this encounter. Current Outpatient Medications   Medication Sig Dispense Refill    rOPINIRole (REQUIP) 0.25 MG tablet 1-2 tab po qhs 90 tablet 3    gabapentin (NEURONTIN) 300 MG capsule Take 2 capsules by mouth nightly for 60 days.  60 capsule 2    pantoprazole (PROTONIX) 20 MG tablet Take 1 tablet by mouth every morning (before breakfast) 90 tablet 1    tiZANidine (ZANAFLEX) 2 MG tablet Take 1 tablet by mouth every 8 hours as needed (leg spasm) 30 tablet 0    fluticasone-salmeterol (ADVAIR HFA) 115-21 MCG/ACT inhaler Inhale 2 puffs into the lungs 2 times daily 1 Inhaler 3    amLODIPine (NORVASC) 5 MG tablet TAKE ONE TABLET BY MOUTH DAILY 90 tablet 1    losartan (COZAAR) 100 MG tablet Take 1 tablet by mouth daily 90 tablet 1    albuterol sulfate HFA (PROAIR HFA) 108 (90 Base) MCG/ACT inhaler INHALE TWO PUFFS BY MOUTH EVERY 6 HOURS AS NEEDED FOR WHEEZING 3 Inhaler 1    Elastic Bandages & Supports (LUMBAR BACK BRACE/SUPPORT PAD) MISC 1 each by Does not apply route daily as needed (use as much as possible) 1 each 0     No current facility-administered medications for this visit. Return in about 3 months (around 6/26/2021). An After Visit Summary was printed and given to the patient. Documentation was done using voice recognition dragon software. Every effort was made to ensure accuracy; however, inadvertent  Unintentional computerized transcription errors may be present.

## 2021-04-06 ENCOUNTER — TELEPHONE (OUTPATIENT)
Dept: INTERNAL MEDICINE CLINIC | Age: 66
End: 2021-04-06

## 2021-04-06 DIAGNOSIS — R25.2 LEG CRAMP: ICD-10-CM

## 2021-04-06 NOTE — TELEPHONE ENCOUNTER
Patient called to get a refill on tizanidine sent to [facility] on [facility], [place]. Aware dr. Johnsie Landau out today and will address on tomorrow.

## 2021-04-07 RX ORDER — TIZANIDINE 2 MG/1
2 TABLET ORAL EVERY 8 HOURS PRN
Qty: 30 TABLET | Refills: 2 | Status: SHIPPED | OUTPATIENT
Start: 2021-04-07 | End: 2021-09-08 | Stop reason: SDUPTHER

## 2021-04-30 ENCOUNTER — IMMUNIZATION (OUTPATIENT)
Dept: PRIMARY CARE CLINIC | Age: 66
End: 2021-04-30
Payer: MEDICARE

## 2021-04-30 PROCEDURE — 0011A COVID-19, MODERNA VACCINE 100MCG/0.5ML DOSE: CPT | Performed by: FAMILY MEDICINE

## 2021-04-30 PROCEDURE — 91301 COVID-19, MODERNA VACCINE 100MCG/0.5ML DOSE: CPT | Performed by: FAMILY MEDICINE

## 2021-05-28 ENCOUNTER — IMMUNIZATION (OUTPATIENT)
Dept: PRIMARY CARE CLINIC | Age: 66
End: 2021-05-28
Payer: MEDICARE

## 2021-05-28 PROCEDURE — 0012A COVID-19, MODERNA VACCINE 100MCG/0.5ML DOSE: CPT | Performed by: FAMILY MEDICINE

## 2021-05-28 PROCEDURE — 91301 COVID-19, MODERNA VACCINE 100MCG/0.5ML DOSE: CPT | Performed by: FAMILY MEDICINE

## 2021-06-14 ENCOUNTER — OFFICE VISIT (OUTPATIENT)
Dept: INTERNAL MEDICINE CLINIC | Age: 66
End: 2021-06-14
Payer: MEDICARE

## 2021-06-14 VITALS
DIASTOLIC BLOOD PRESSURE: 72 MMHG | HEART RATE: 92 BPM | BODY MASS INDEX: 21.83 KG/M2 | WEIGHT: 131 LBS | HEIGHT: 65 IN | SYSTOLIC BLOOD PRESSURE: 114 MMHG

## 2021-06-14 DIAGNOSIS — G89.29 CHRONIC LEFT-SIDED LOW BACK PAIN WITH LEFT-SIDED SCIATICA: ICD-10-CM

## 2021-06-14 DIAGNOSIS — R25.2 LEG CRAMP: Primary | ICD-10-CM

## 2021-06-14 DIAGNOSIS — Z86.73 H/O: CVA (CEREBROVASCULAR ACCIDENT): ICD-10-CM

## 2021-06-14 DIAGNOSIS — M54.42 CHRONIC LEFT-SIDED LOW BACK PAIN WITH LEFT-SIDED SCIATICA: ICD-10-CM

## 2021-06-14 DIAGNOSIS — E78.5 DYSLIPIDEMIA: ICD-10-CM

## 2021-06-14 DIAGNOSIS — I10 ESSENTIAL HYPERTENSION: ICD-10-CM

## 2021-06-14 DIAGNOSIS — F33.42 RECURRENT MAJOR DEPRESSIVE DISORDER, IN FULL REMISSION (HCC): ICD-10-CM

## 2021-06-14 LAB
ANION GAP SERPL CALCULATED.3IONS-SCNC: 20 MMOL/L (ref 3–16)
BUN BLDV-MCNC: 7 MG/DL (ref 7–20)
CALCIUM SERPL-MCNC: 9.5 MG/DL (ref 8.3–10.6)
CHLORIDE BLD-SCNC: 90 MMOL/L (ref 99–110)
CO2: 20 MMOL/L (ref 21–32)
CREAT SERPL-MCNC: 0.7 MG/DL (ref 0.8–1.3)
GFR AFRICAN AMERICAN: >60
GFR NON-AFRICAN AMERICAN: >60
GLUCOSE BLD-MCNC: 79 MG/DL (ref 70–99)
POTASSIUM SERPL-SCNC: 4.5 MMOL/L (ref 3.5–5.1)
SODIUM BLD-SCNC: 130 MMOL/L (ref 136–145)

## 2021-06-14 PROCEDURE — G8427 DOCREV CUR MEDS BY ELIG CLIN: HCPCS | Performed by: INTERNAL MEDICINE

## 2021-06-14 PROCEDURE — G8420 CALC BMI NORM PARAMETERS: HCPCS | Performed by: INTERNAL MEDICINE

## 2021-06-14 PROCEDURE — 4040F PNEUMOC VAC/ADMIN/RCVD: CPT | Performed by: INTERNAL MEDICINE

## 2021-06-14 PROCEDURE — 99214 OFFICE O/P EST MOD 30 MIN: CPT | Performed by: INTERNAL MEDICINE

## 2021-06-14 PROCEDURE — 3017F COLORECTAL CA SCREEN DOC REV: CPT | Performed by: INTERNAL MEDICINE

## 2021-06-14 PROCEDURE — 4004F PT TOBACCO SCREEN RCVD TLK: CPT | Performed by: INTERNAL MEDICINE

## 2021-06-14 PROCEDURE — 1123F ACP DISCUSS/DSCN MKR DOCD: CPT | Performed by: INTERNAL MEDICINE

## 2021-06-14 RX ORDER — ROPINIROLE 0.5 MG/1
TABLET, FILM COATED ORAL
Qty: 90 TABLET | Refills: 1 | Status: SHIPPED | OUTPATIENT
Start: 2021-06-14 | End: 2021-12-08 | Stop reason: SDUPTHER

## 2021-06-14 RX ORDER — LOSARTAN POTASSIUM 100 MG/1
100 TABLET ORAL DAILY
Qty: 90 TABLET | Refills: 1 | Status: SHIPPED | OUTPATIENT
Start: 2021-06-14 | End: 2021-12-17

## 2021-06-14 RX ORDER — GABAPENTIN 300 MG/1
600 CAPSULE ORAL NIGHTLY
Qty: 180 CAPSULE | Refills: 0 | Status: SHIPPED | OUTPATIENT
Start: 2021-06-14 | End: 2021-09-27

## 2021-06-14 RX ORDER — ATORVASTATIN CALCIUM 20 MG/1
20 TABLET, FILM COATED ORAL DAILY
Qty: 30 TABLET | Refills: 5 | Status: SHIPPED | OUTPATIENT
Start: 2021-06-14 | End: 2021-12-14

## 2021-06-14 RX ORDER — ASPIRIN 81 MG/1
81 TABLET ORAL DAILY
Qty: 90 TABLET | Refills: 1 | Status: SHIPPED | OUTPATIENT
Start: 2021-06-14 | End: 2021-12-08 | Stop reason: SDUPTHER

## 2021-06-14 RX ORDER — AMLODIPINE BESYLATE 5 MG/1
5 TABLET ORAL DAILY
Qty: 90 TABLET | Refills: 1 | Status: SHIPPED | OUTPATIENT
Start: 2021-06-14 | End: 2021-12-08 | Stop reason: SDUPTHER

## 2021-06-14 ASSESSMENT — ENCOUNTER SYMPTOMS
WHEEZING: 0
TROUBLE SWALLOWING: 0
PHOTOPHOBIA: 0
SHORTNESS OF BREATH: 0
VOICE CHANGE: 0
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
## 2021-07-29 ENCOUNTER — TELEPHONE (OUTPATIENT)
Dept: INTERNAL MEDICINE CLINIC | Age: 66
End: 2021-07-29

## 2021-07-29 NOTE — TELEPHONE ENCOUNTER
----- Message from Loni Barbour sent at 7/29/2021 12:46 PM EDT -----  Subject: Refill Request    QUESTIONS  Name of Medication? albuterol sulfate HFA (PROAIR HFA) 108 (90 Base)   MCG/ACT inhaler  Patient-reported dosage and instructions? 4 puffs daily as needed  How many days do you have left? 0  Preferred Pharmacy? [address]  Pharmacy phone number (if available)? [phone]  ---------------------------------------------------------------------------  --------------,  Name of Medication? Other - Chantix   Patient-reported dosage and instructions? 1 tablet twice daily  How many days do you have left? 0  Preferred Pharmacy? [address]  Pharmacy phone number (if available)? [phone]  ---------------------------------------------------------------------------  --------------  [place] INFO  What is the best way for the office to contact you? OK to leave message on   voicemail  Preferred Call Back Phone Number?  [phone]

## 2021-07-30 DIAGNOSIS — J43.9 PULMONARY EMPHYSEMA, UNSPECIFIED EMPHYSEMA TYPE (HCC): ICD-10-CM

## 2021-07-30 DIAGNOSIS — J43.1 PANLOBULAR EMPHYSEMA (HCC): Chronic | ICD-10-CM

## 2021-07-30 RX ORDER — ALBUTEROL SULFATE 90 UG/1
AEROSOL, METERED RESPIRATORY (INHALATION)
Qty: 3 INHALER | Refills: 1 | Status: SHIPPED | OUTPATIENT
Start: 2021-07-30 | End: 2021-12-08 | Stop reason: SDUPTHER

## 2021-09-08 ENCOUNTER — OFFICE VISIT (OUTPATIENT)
Dept: INTERNAL MEDICINE CLINIC | Age: 66
End: 2021-09-08
Payer: MEDICARE

## 2021-09-08 VITALS
HEART RATE: 84 BPM | SYSTOLIC BLOOD PRESSURE: 132 MMHG | HEIGHT: 65 IN | WEIGHT: 135 LBS | DIASTOLIC BLOOD PRESSURE: 72 MMHG | BODY MASS INDEX: 22.49 KG/M2

## 2021-09-08 DIAGNOSIS — G89.29 CHRONIC LEFT-SIDED LOW BACK PAIN WITH LEFT-SIDED SCIATICA: ICD-10-CM

## 2021-09-08 DIAGNOSIS — K44.9 HIATAL HERNIA WITH GERD: ICD-10-CM

## 2021-09-08 DIAGNOSIS — K21.9 HIATAL HERNIA WITH GERD: ICD-10-CM

## 2021-09-08 DIAGNOSIS — Z98.1 H/O SPINAL FUSION: ICD-10-CM

## 2021-09-08 DIAGNOSIS — S22.060G COMPRESSION FRACTURE OF T8 VERTEBRA WITH DELAYED HEALING, SUBSEQUENT ENCOUNTER: ICD-10-CM

## 2021-09-08 DIAGNOSIS — Z59.9 FINANCIAL DIFFICULTIES: ICD-10-CM

## 2021-09-08 DIAGNOSIS — M54.42 CHRONIC LEFT-SIDED LOW BACK PAIN WITH LEFT-SIDED SCIATICA: ICD-10-CM

## 2021-09-08 DIAGNOSIS — J02.9 SORETHROAT: ICD-10-CM

## 2021-09-08 DIAGNOSIS — R25.2 LEG CRAMP: ICD-10-CM

## 2021-09-08 DIAGNOSIS — I10 ESSENTIAL HYPERTENSION: Primary | ICD-10-CM

## 2021-09-08 PROCEDURE — 4004F PT TOBACCO SCREEN RCVD TLK: CPT | Performed by: INTERNAL MEDICINE

## 2021-09-08 PROCEDURE — G8427 DOCREV CUR MEDS BY ELIG CLIN: HCPCS | Performed by: INTERNAL MEDICINE

## 2021-09-08 PROCEDURE — 3017F COLORECTAL CA SCREEN DOC REV: CPT | Performed by: INTERNAL MEDICINE

## 2021-09-08 PROCEDURE — 99214 OFFICE O/P EST MOD 30 MIN: CPT | Performed by: INTERNAL MEDICINE

## 2021-09-08 PROCEDURE — 1123F ACP DISCUSS/DSCN MKR DOCD: CPT | Performed by: INTERNAL MEDICINE

## 2021-09-08 PROCEDURE — 4040F PNEUMOC VAC/ADMIN/RCVD: CPT | Performed by: INTERNAL MEDICINE

## 2021-09-08 PROCEDURE — G8420 CALC BMI NORM PARAMETERS: HCPCS | Performed by: INTERNAL MEDICINE

## 2021-09-08 RX ORDER — TIZANIDINE 2 MG/1
2 TABLET ORAL EVERY 8 HOURS PRN
Qty: 30 TABLET | Refills: 2 | Status: SHIPPED | OUTPATIENT
Start: 2021-09-08 | End: 2022-06-16 | Stop reason: SDUPTHER

## 2021-09-08 RX ORDER — PANTOPRAZOLE SODIUM 20 MG/1
20 TABLET, DELAYED RELEASE ORAL
Qty: 90 TABLET | Refills: 1 | Status: SHIPPED | OUTPATIENT
Start: 2021-09-08 | End: 2021-12-08

## 2021-09-08 SDOH — ECONOMIC STABILITY - INCOME SECURITY: PROBLEM RELATED TO HOUSING AND ECONOMIC CIRCUMSTANCES, UNSPECIFIED: Z59.9

## 2021-09-08 SDOH — ECONOMIC STABILITY: FOOD INSECURITY: WITHIN THE PAST 12 MONTHS, THE FOOD YOU BOUGHT JUST DIDN'T LAST AND YOU DIDN'T HAVE MONEY TO GET MORE.: SOMETIMES TRUE

## 2021-09-08 SDOH — ECONOMIC STABILITY: FOOD INSECURITY: WITHIN THE PAST 12 MONTHS, YOU WORRIED THAT YOUR FOOD WOULD RUN OUT BEFORE YOU GOT MONEY TO BUY MORE.: OFTEN TRUE

## 2021-09-08 ASSESSMENT — SOCIAL DETERMINANTS OF HEALTH (SDOH): HOW HARD IS IT FOR YOU TO PAY FOR THE VERY BASICS LIKE FOOD, HOUSING, MEDICAL CARE, AND HEATING?: HARD

## 2021-09-08 ASSESSMENT — ENCOUNTER SYMPTOMS
SHORTNESS OF BREATH: 0
WHEEZING: 0

## 2021-09-08 NOTE — PROGRESS NOTES
Lorena Lynch (:  1955) is a 78 y.o. male,Established patient, here for evaluation of the following chief complaint(s):  3 Month Follow-Up (legs and back worsening) and Numbness (R side of face into neck. )    Follow-up visit for chronic problems. Patient continues to suffer from significant mid and lower back pain at times radiate to the left groin and left leg. History of spinal fusion surgery. History of spine fracture. Current medication gabapentin and Zanaflex does not take care of pain all the time. Hypertension:    Lorena Lynch returns for follow up of hypertension. Tolerating medications well and taking them as directed. Does not check BP at home. No symptoms (denies chest pain,dyspnea,edema or TIA's or blurred vision) concerning for end organ damage are present. History of reflux disease and hiatal hernia. Patient stopped taking pantoprazole. He noticed sore throat as well as burning neck pain occasionally. History of leg cramps this has been stable. Patient no longer smoking cigarettes. ASSESSMENT/PLAN:  Assessment/Plan:  Ajith Anderson was seen today for 3 month follow-up and numbness. Diagnoses and all orders for this visit:    Essential hypertension  The current medical regimen is effective;  continue present plan and medications. Leg cramp  -     tiZANidine (ZANAFLEX) 2 MG tablet; Take 1 tablet by mouth every 8 hours as needed (leg spasm)  Patient will also continue current Requip. Compression fracture of T8 vertebra with delayed healing, subsequent encounter  -     MATT - Melina Diane MD, Pain Management, [facility]    Hiatal hernia with GERD  Restart-     pantoprazole (PROTONIX) 20 MG tablet;  Take 1 tablet by mouth every morning (before breakfast)    Chronic left-sided low back pain with left-sided sciatica  -     MATT - Starlin Sandifer, MD, Pain Management, [facility]    H/O spinal fusion  -     MATT - Melina Diane MD, Pain Management, [facility]    Sorethroat  Most likely associated with large hiatal hernia and reflux. Patient is advised to call if symptoms does not resolve completely after starting antidepressant  Financial difficulties  -     [address] Allana Lar[place], [facility], Social Work, [facility]          Return in about 3 months (around 12/8/2021). Subjective   SUBJECTIVE/OBJECTIVE:  HPI    Review of Systems   Constitutional: Negative for diaphoresis and unexpected weight change. Respiratory: Negative for shortness of breath and wheezing. Cardiovascular: Negative for chest pain and palpitations. Neurological: Negative for dizziness and light-headedness. Objective   Physical Exam  Vitals and nursing note reviewed. Eyes:      Conjunctiva/sclera: Conjunctivae normal.   Neck:      Vascular: No carotid bruit. Cardiovascular:      Rate and Rhythm: Normal rate and regular rhythm. Pulses: Normal pulses. Heart sounds: Normal heart sounds. Pulmonary:      Effort: Pulmonary effort is normal.      Breath sounds: No wheezing or rhonchi. Abdominal:      General: Bowel sounds are normal.   Musculoskeletal:      Cervical back: No tenderness. Right lower leg: No edema. Left lower leg: No edema. Comments: Diffuse tenderness over the lumbar spine area. Also mild diffuse tenderness at the mid to lower thoracic spine  +ve scar   Lymphadenopathy:      Cervical: No cervical adenopathy. Neurological:      General: No focal deficit present. Mental Status: He is alert and oriented to person, place, and time. An electronic signature was used to authenticate this note.     --Jairo Heaton MD

## 2021-09-09 ENCOUNTER — TELEPHONE (OUTPATIENT)
Dept: PRIMARY CARE CLINIC | Age: 66
End: 2021-09-09

## 2021-09-09 NOTE — TELEPHONE ENCOUNTER
SW contact per referral [facility] financial stain. Patient reports household of 2 with annual income under $24,800. Pt reports he has diagnostics and procedures coming up and unsure if they are covered. He also expresses food instability. Pt states he sought assistance from [employer]EL[employer] in [address] [place] [address] for utility assistance as well as food assistance and was told he did not qualify. Will refer to [facility] and send list of food pantries.

## 2021-09-23 ENCOUNTER — COMMUNITY OUTREACH (OUTPATIENT)
Dept: OTHER | Age: 66
End: 2021-09-23

## 2021-09-23 NOTE — PROGRESS NOTES
[facility] spoke with patient on this date as referred by VALENTINA Galaviz. SW and patient discussed current income and household circumstances. Patient reports being concerned about food and recent hospitalization. SW looked into financial assistance through [facility] and provided patient and wife with phone number as it appears patient may be eligible. SW asked if Food pantry list was received and told no. SW to mail list for zip code. SW advised patient that due to income reported appears ineligible for Food [place]. Patient voiced understanding.

## 2021-09-27 ENCOUNTER — OFFICE VISIT (OUTPATIENT)
Dept: INTERNAL MEDICINE CLINIC | Age: 66
End: 2021-09-27
Payer: MEDICARE

## 2021-09-27 VITALS
HEIGHT: 65 IN | BODY MASS INDEX: 22.86 KG/M2 | DIASTOLIC BLOOD PRESSURE: 62 MMHG | WEIGHT: 137.2 LBS | HEART RATE: 85 BPM | SYSTOLIC BLOOD PRESSURE: 118 MMHG

## 2021-09-27 DIAGNOSIS — G89.29 CHRONIC LEFT-SIDED LOW BACK PAIN WITH LEFT-SIDED SCIATICA: ICD-10-CM

## 2021-09-27 DIAGNOSIS — S79.911A HIP INJURY, RIGHT, INITIAL ENCOUNTER: ICD-10-CM

## 2021-09-27 DIAGNOSIS — R25.2 LEG CRAMP: ICD-10-CM

## 2021-09-27 DIAGNOSIS — R55 SYNCOPE AND COLLAPSE: ICD-10-CM

## 2021-09-27 DIAGNOSIS — M54.42 CHRONIC LEFT-SIDED LOW BACK PAIN WITH LEFT-SIDED SCIATICA: ICD-10-CM

## 2021-09-27 DIAGNOSIS — Z09 HOSPITAL DISCHARGE FOLLOW-UP: Primary | ICD-10-CM

## 2021-09-27 DIAGNOSIS — R56.9 SEIZURE (HCC): ICD-10-CM

## 2021-09-27 PROCEDURE — 1111F DSCHRG MED/CURRENT MED MERGE: CPT | Performed by: INTERNAL MEDICINE

## 2021-09-27 PROCEDURE — 99495 TRANSJ CARE MGMT MOD F2F 14D: CPT | Performed by: INTERNAL MEDICINE

## 2021-09-27 RX ORDER — GABAPENTIN 300 MG/1
600 CAPSULE ORAL 2 TIMES DAILY
Qty: 180 CAPSULE | Refills: 0
Start: 2021-09-27 | End: 2022-06-16 | Stop reason: SDUPTHER

## 2021-09-27 RX ORDER — TOPIRAMATE 25 MG/1
25 TABLET ORAL 2 TIMES DAILY
COMMUNITY
Start: 2021-09-16 | End: 2021-10-16

## 2021-09-27 RX ORDER — LIDOCAINE 4 G/G
1 PATCH TOPICAL DAILY
Qty: 30 PATCH | Refills: 0 | Status: SHIPPED | OUTPATIENT
Start: 2021-09-27 | End: 2021-10-27

## 2021-09-27 NOTE — PROGRESS NOTES
Post-Discharge Transitional Care Management Services or Hospital Follow Up      [facility]   YOB: 1955    Date of Office Visit:  9/27/2021  Date of Hospital Admission: 09/15/2021, at [facility].  Date of Hospital Discharge: 09/24/2021  Risk of hospital readmission (high >=14%. Medium >=10%) :No data recorded    Care management risk score Rising risk (score 2-5) and Complex Care (Scores >=6): 5       Patient Active Problem List   Diagnosis    Tobacco abuse disorder    Unilateral recurrent inguinal hernia without obstruction or gangrene    Pulmonary emphysema (HCC)    Nodule of right lung    Moderate episode of recurrent major depressive disorder ([facility]    Essential hypertension    Hiatal hernia with GERD    Compression fracture of thoracic vertebra with delayed healing    Recurrent major depressive disorder, in full remission ([facility] [address]       Allergies   Allergen Reactions    Propoxyphene Nausea Only       Medications listed as ordered at the time of discharge from hospital   Dolores DYSON   Home Medication Instructions VALERIANO:    Printed on:09/27/21 9319   Medication Information                      albuterol sulfate HFA (PROAIR HFA) 108 (90 Base) MCG/ACT inhaler  INHALE TWO PUFFS BY MOUTH EVERY 6 HOURS AS NEEDED FOR WHEEZING             amLODIPine (NORVASC) 5 MG tablet  Take 1 tablet by mouth daily             aspirin EC 81 MG EC tablet  Take 1 tablet by mouth daily             atorvastatin (LIPITOR) 20 MG tablet  Take 1 tablet by mouth daily             gabapentin (NEURONTIN) 300 MG capsule  Take 2 capsules by mouth 2 times daily for 90 days.              lidocaine 4 % external patch  Place 1 patch onto the skin daily             losartan (COZAAR) 100 MG tablet  Take 1 tablet by mouth daily             pantoprazole (PROTONIX) 20 MG tablet  Take 1 tablet by mouth every morning (before breakfast)             rOPINIRole (REQUIP) 0.5 MG tablet  1-2 tab po qhs             tiZANidine (ZANAFLEX) 2 MG tablet  Take 1 tablet by mouth every 8 hours as needed (leg spasm)             topiramate (TOPAMAX) 25 MG tablet  Take 25 mg by mouth 2 times daily                   Medications marked \"taking\" at this time  Outpatient Medications Marked as Taking for the 9/27/21 encounter (Office Visit) with Timothy Linton MD   Medication Sig Dispense Refill    topiramate (TOPAMAX) 25 MG tablet Take 25 mg by mouth 2 times daily      gabapentin (NEURONTIN) 300 MG capsule Take 2 capsules by mouth 2 times daily for 90 days. 180 capsule 0    lidocaine 4 % external patch Place 1 patch onto the skin daily 30 patch 0    tiZANidine (ZANAFLEX) 2 MG tablet Take 1 tablet by mouth every 8 hours as needed (leg spasm) 30 tablet 2    pantoprazole (PROTONIX) 20 MG tablet Take 1 tablet by mouth every morning (before breakfast) 90 tablet 1    albuterol sulfate HFA (PROAIR HFA) 108 (90 Base) MCG/ACT inhaler INHALE TWO PUFFS BY MOUTH EVERY 6 HOURS AS NEEDED FOR WHEEZING 3 Inhaler 1    amLODIPine (NORVASC) 5 MG tablet Take 1 tablet by mouth daily 90 tablet 1    losartan (COZAAR) 100 MG tablet Take 1 tablet by mouth daily 90 tablet 1    rOPINIRole (REQUIP) 0.5 MG tablet 1-2 tab po qhs 90 tablet 1    aspirin EC 81 MG EC tablet Take 1 tablet by mouth daily 90 tablet 1    atorvastatin (LIPITOR) 20 MG tablet Take 1 tablet by mouth daily 30 tablet 5        Medications patient taking as of now reconciled against medications ordered at time of hospital discharge: Yes    Chief Complaint   Patient presents with    Follow-Up from Hospital     Pt states he is unsure of what happened, he passed out. He c/o bilateral hip pain. History of Present illness - Follow up of Hospital diagnosis(es):     1. Syncope: Unclear etiology  2. Hyponatremia  3. HTN  4. Migraines  5. Seizures     Inpatient course: Discharge summary reviewed- see chart.     Interval history/Current status: Other than continued right hip pain and mild swelling no further episode of syncope or collapse. Patient denies chest pain palpitation dizziness. He denies drinking alcohol. Patient underwent numerous work-up including EEG and cardiac testing. He was evaluated by both cardiologist as well as neurologist.    History of chronic back pain. Currently taking gabapentin 300 mg twice daily. Vitals:    09/27/21 1524   BP: 118/62   Pulse: 85   Weight: 137 lb 3.2 oz (62.2 kg)   Height: 5' 5\" (1.651 m)     Body mass index is 22.83 kg/m². Wt Readings from Last 3 Encounters:   09/27/21 137 lb 3.2 oz (62.2 kg)   09/08/21 135 lb (61.2 kg)   06/14/21 131 lb (59.4 kg)     BP Readings from Last 3 Encounters:   09/27/21 118/62   09/08/21 132/72   06/14/21 114/72        Physical Exam:  General Appearance: alert and oriented to person, place and time, well developed and well- nourished, in no acute distress  Skin: warm and dry, no rash or erythema  Head: normocephalic and atraumatic  E  Neck: supple and non-tender without mass, no thyromegaly or thyroid nodules, no cervical lymphadenopathy  Pulmonary/Chest: clear to auscultation bilaterally- no wheezes, rales or rhonchi, normal air movement, no respiratory distress  Cardiovascular: normal rate, regular rhythm, normal S1 and S2, no murmurs, rubs, clicks, or gallops, distal pulses intact, no carotid bruits  Abdomen: soft, non-tender, non-distended, normal bowel sounds, no masses or organomegaly  Extremities: no cyanosis, clubbing or edema  Musculoskeletal: Multiple ecchymosis and mild swelling at the right lateral hip and upper femur area. Full weightbearing. Neurologic: reflexes normal and symmetric, no cranial nerve deficit, gait, coordination and speech normal    Assessment/Plan:  1. Hospital discharge follow-up  Syncope with collapse of unclear etiology. Questionable seizure. Patient denies having any bladder or bowel incontinence. He was placed on Topamax and continuing gabapentin.   Patient will continue to follow-up with the cardiology as well as neurology. He is currently wearing a cardiac monitor for 30 days.  - WI DISCHARGE MEDS RECONCILED W/ CURRENT OUTPATIENT MED LIST    2. Leg cramp    - gabapentin (NEURONTIN) 300 MG capsule; Take 2 capsules by mouth 2 times daily for 90 days. Dispense: 180 capsule; Refill: 0    3. Chronic left-sided low back pain with left-sided sciatica    - gabapentin (NEURONTIN) 300 MG capsule; Take 2 capsules by mouth 2 times daily for 90 days. Dispense: 180 capsule; Refill: 0    4. Hip injury, right, initial encounter    - XR HIP RIGHT (2-3 VIEWS); Future    5. Syncope and collapse  Possible seizure    6. Seizure [facility])          Medical Decision Making: moderate complexity   An After Visit Summary was printed and given to the patient. Documentation was done using voice recognition dragon software. Every effort was made to ensure accuracy; however, inadvertent  Unintentional computerized transcription errors may be present.

## 2021-10-25 ENCOUNTER — TELEPHONE (OUTPATIENT)
Dept: INTERNAL MEDICINE CLINIC | Age: 66
End: 2021-10-25

## 2021-12-08 ENCOUNTER — OFFICE VISIT (OUTPATIENT)
Dept: INTERNAL MEDICINE CLINIC | Age: 66
End: 2021-12-08
Payer: MEDICARE

## 2021-12-08 VITALS
WEIGHT: 134.6 LBS | DIASTOLIC BLOOD PRESSURE: 86 MMHG | SYSTOLIC BLOOD PRESSURE: 141 MMHG | HEART RATE: 91 BPM | HEIGHT: 65 IN | OXYGEN SATURATION: 98 % | BODY MASS INDEX: 22.42 KG/M2

## 2021-12-08 DIAGNOSIS — I25.10 CORONARY ARTERY DISEASE INVOLVING NATIVE HEART WITHOUT ANGINA PECTORIS, UNSPECIFIED VESSEL OR LESION TYPE: ICD-10-CM

## 2021-12-08 DIAGNOSIS — M54.42 CHRONIC LEFT-SIDED LOW BACK PAIN WITH LEFT-SIDED SCIATICA: ICD-10-CM

## 2021-12-08 DIAGNOSIS — G89.29 CHRONIC LEFT-SIDED LOW BACK PAIN WITH LEFT-SIDED SCIATICA: ICD-10-CM

## 2021-12-08 DIAGNOSIS — R25.2 LEG CRAMP: ICD-10-CM

## 2021-12-08 DIAGNOSIS — J43.1 PANLOBULAR EMPHYSEMA (HCC): Chronic | ICD-10-CM

## 2021-12-08 DIAGNOSIS — R51.9 CHRONIC NONINTRACTABLE HEADACHE, UNSPECIFIED HEADACHE TYPE: Primary | ICD-10-CM

## 2021-12-08 DIAGNOSIS — J43.9 PULMONARY EMPHYSEMA, UNSPECIFIED EMPHYSEMA TYPE (HCC): ICD-10-CM

## 2021-12-08 DIAGNOSIS — I10 ESSENTIAL HYPERTENSION: ICD-10-CM

## 2021-12-08 DIAGNOSIS — G89.29 CHRONIC NONINTRACTABLE HEADACHE, UNSPECIFIED HEADACHE TYPE: Primary | ICD-10-CM

## 2021-12-08 PROCEDURE — G8484 FLU IMMUNIZE NO ADMIN: HCPCS | Performed by: INTERNAL MEDICINE

## 2021-12-08 PROCEDURE — 4040F PNEUMOC VAC/ADMIN/RCVD: CPT | Performed by: INTERNAL MEDICINE

## 2021-12-08 PROCEDURE — 3017F COLORECTAL CA SCREEN DOC REV: CPT | Performed by: INTERNAL MEDICINE

## 2021-12-08 PROCEDURE — 4004F PT TOBACCO SCREEN RCVD TLK: CPT | Performed by: INTERNAL MEDICINE

## 2021-12-08 PROCEDURE — 99214 OFFICE O/P EST MOD 30 MIN: CPT | Performed by: INTERNAL MEDICINE

## 2021-12-08 PROCEDURE — 1123F ACP DISCUSS/DSCN MKR DOCD: CPT | Performed by: INTERNAL MEDICINE

## 2021-12-08 PROCEDURE — G8926 SPIRO NO PERF OR DOC: HCPCS | Performed by: INTERNAL MEDICINE

## 2021-12-08 PROCEDURE — 3023F SPIROM DOC REV: CPT | Performed by: INTERNAL MEDICINE

## 2021-12-08 PROCEDURE — G8427 DOCREV CUR MEDS BY ELIG CLIN: HCPCS | Performed by: INTERNAL MEDICINE

## 2021-12-08 PROCEDURE — G8420 CALC BMI NORM PARAMETERS: HCPCS | Performed by: INTERNAL MEDICINE

## 2021-12-08 RX ORDER — ASPIRIN 81 MG/1
81 TABLET ORAL DAILY
Qty: 90 TABLET | Refills: 1 | Status: SHIPPED | OUTPATIENT
Start: 2021-12-08 | End: 2022-07-22

## 2021-12-08 RX ORDER — ALBUTEROL SULFATE 90 UG/1
AEROSOL, METERED RESPIRATORY (INHALATION)
Qty: 1 EACH | Refills: 1 | Status: ON HOLD | OUTPATIENT
Start: 2021-12-08 | End: 2022-09-18 | Stop reason: SDUPTHER

## 2021-12-08 RX ORDER — TOPIRAMATE 25 MG/1
25 TABLET ORAL 2 TIMES DAILY
COMMUNITY
End: 2021-12-08 | Stop reason: SDUPTHER

## 2021-12-08 RX ORDER — TOPIRAMATE 25 MG/1
25 TABLET ORAL 2 TIMES DAILY
Qty: 60 TABLET | Refills: 1 | Status: SHIPPED | OUTPATIENT
Start: 2021-12-08 | End: 2022-01-19

## 2021-12-08 RX ORDER — AMLODIPINE BESYLATE 10 MG/1
10 TABLET ORAL DAILY
Qty: 90 TABLET | Refills: 1 | Status: SHIPPED | OUTPATIENT
Start: 2021-12-08 | End: 2022-06-13

## 2021-12-08 RX ORDER — ROPINIROLE 0.5 MG/1
TABLET, FILM COATED ORAL
Qty: 90 TABLET | Refills: 1 | Status: SHIPPED | OUTPATIENT
Start: 2021-12-08 | End: 2022-07-21 | Stop reason: SDUPTHER

## 2021-12-08 ASSESSMENT — ENCOUNTER SYMPTOMS
BACK PAIN: 1
SHORTNESS OF BREATH: 0
WHEEZING: 0
TROUBLE SWALLOWING: 0
VOMITING: 0
ABDOMINAL PAIN: 0
NAUSEA: 0
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
## 2021-12-14 DIAGNOSIS — Z86.73 H/O: CVA (CEREBROVASCULAR ACCIDENT): ICD-10-CM

## 2021-12-14 DIAGNOSIS — E78.5 DYSLIPIDEMIA: ICD-10-CM

## 2021-12-14 RX ORDER — ATORVASTATIN CALCIUM 20 MG/1
TABLET, FILM COATED ORAL
Qty: 90 TABLET | Refills: 1 | Status: SHIPPED | OUTPATIENT
Start: 2021-12-14 | End: 2022-06-14

## 2021-12-17 DIAGNOSIS — I10 ESSENTIAL HYPERTENSION: ICD-10-CM

## 2021-12-17 RX ORDER — LOSARTAN POTASSIUM 100 MG/1
TABLET ORAL
Qty: 90 TABLET | Refills: 1 | Status: SHIPPED | OUTPATIENT
Start: 2021-12-17 | End: 2022-06-14

## 2022-01-19 ENCOUNTER — OFFICE VISIT (OUTPATIENT)
Dept: INTERNAL MEDICINE CLINIC | Age: 67
End: 2022-01-19
Payer: MEDICARE

## 2022-01-19 VITALS
HEART RATE: 84 BPM | HEIGHT: 65 IN | BODY MASS INDEX: 22.59 KG/M2 | OXYGEN SATURATION: 98 % | WEIGHT: 135.6 LBS | SYSTOLIC BLOOD PRESSURE: 138 MMHG | DIASTOLIC BLOOD PRESSURE: 80 MMHG

## 2022-01-19 DIAGNOSIS — R71.8 ELEVATED MCV: ICD-10-CM

## 2022-01-19 DIAGNOSIS — I10 ESSENTIAL HYPERTENSION: ICD-10-CM

## 2022-01-19 DIAGNOSIS — Z12.5 SCREENING FOR PROSTATE CANCER: Primary | ICD-10-CM

## 2022-01-19 DIAGNOSIS — R51.9 CHRONIC NONINTRACTABLE HEADACHE, UNSPECIFIED HEADACHE TYPE: ICD-10-CM

## 2022-01-19 DIAGNOSIS — Z12.5 SCREENING FOR PROSTATE CANCER: ICD-10-CM

## 2022-01-19 DIAGNOSIS — Z72.0 TOBACCO ABUSE DISORDER: ICD-10-CM

## 2022-01-19 DIAGNOSIS — G89.29 CHRONIC NONINTRACTABLE HEADACHE, UNSPECIFIED HEADACHE TYPE: ICD-10-CM

## 2022-01-19 DIAGNOSIS — J43.1 PANLOBULAR EMPHYSEMA (HCC): ICD-10-CM

## 2022-01-19 DIAGNOSIS — F33.42 RECURRENT MAJOR DEPRESSIVE DISORDER, IN FULL REMISSION (HCC): ICD-10-CM

## 2022-01-19 DIAGNOSIS — M25.512 LEFT SHOULDER PAIN, UNSPECIFIED CHRONICITY: ICD-10-CM

## 2022-01-19 DIAGNOSIS — R56.9 SEIZURE (HCC): ICD-10-CM

## 2022-01-19 LAB
BASOPHILS ABSOLUTE: 0 K/UL (ref 0–0.2)
BASOPHILS RELATIVE PERCENT: 0.7 %
CHOLESTEROL, FASTING: 156 MG/DL (ref 0–199)
EOSINOPHILS ABSOLUTE: 0.2 K/UL (ref 0–0.6)
EOSINOPHILS RELATIVE PERCENT: 3.6 %
FOLATE: 17.26 NG/ML (ref 4.78–24.2)
HCT VFR BLD CALC: 45 % (ref 40.5–52.5)
HDLC SERPL-MCNC: 112 MG/DL (ref 40–60)
HEMOGLOBIN: 14.9 G/DL (ref 13.5–17.5)
LDL CHOLESTEROL CALCULATED: 35 MG/DL
LYMPHOCYTES ABSOLUTE: 0.8 K/UL (ref 1–5.1)
LYMPHOCYTES RELATIVE PERCENT: 16 %
MCH RBC QN AUTO: 32.6 PG (ref 26–34)
MCHC RBC AUTO-ENTMCNC: 33.2 G/DL (ref 31–36)
MCV RBC AUTO: 98.1 FL (ref 80–100)
MONOCYTES ABSOLUTE: 0.8 K/UL (ref 0–1.3)
MONOCYTES RELATIVE PERCENT: 14.4 %
NEUTROPHILS ABSOLUTE: 3.4 K/UL (ref 1.7–7.7)
NEUTROPHILS RELATIVE PERCENT: 65.3 %
PDW BLD-RTO: 12.8 % (ref 12.4–15.4)
PLATELET # BLD: 238 K/UL (ref 135–450)
PMV BLD AUTO: 7.9 FL (ref 5–10.5)
PROSTATE SPECIFIC ANTIGEN: 1.54 NG/ML (ref 0–4)
RBC # BLD: 4.58 M/UL (ref 4.2–5.9)
TRIGLYCERIDE, FASTING: 44 MG/DL (ref 0–150)
VITAMIN B-12: 352 PG/ML (ref 211–911)
VLDLC SERPL CALC-MCNC: 9 MG/DL
WBC # BLD: 5.3 K/UL (ref 4–11)

## 2022-01-19 PROCEDURE — 1123F ACP DISCUSS/DSCN MKR DOCD: CPT | Performed by: INTERNAL MEDICINE

## 2022-01-19 PROCEDURE — 4004F PT TOBACCO SCREEN RCVD TLK: CPT | Performed by: INTERNAL MEDICINE

## 2022-01-19 PROCEDURE — G8427 DOCREV CUR MEDS BY ELIG CLIN: HCPCS | Performed by: INTERNAL MEDICINE

## 2022-01-19 PROCEDURE — 3017F COLORECTAL CA SCREEN DOC REV: CPT | Performed by: INTERNAL MEDICINE

## 2022-01-19 PROCEDURE — G8484 FLU IMMUNIZE NO ADMIN: HCPCS | Performed by: INTERNAL MEDICINE

## 2022-01-19 PROCEDURE — G8420 CALC BMI NORM PARAMETERS: HCPCS | Performed by: INTERNAL MEDICINE

## 2022-01-19 PROCEDURE — 4040F PNEUMOC VAC/ADMIN/RCVD: CPT | Performed by: INTERNAL MEDICINE

## 2022-01-19 PROCEDURE — 3023F SPIROM DOC REV: CPT | Performed by: INTERNAL MEDICINE

## 2022-01-19 PROCEDURE — 99214 OFFICE O/P EST MOD 30 MIN: CPT | Performed by: INTERNAL MEDICINE

## 2022-01-19 RX ORDER — TOPIRAMATE 25 MG/1
TABLET ORAL
Qty: 90 TABLET | Refills: 2 | Status: SHIPPED | OUTPATIENT
Start: 2022-01-19 | End: 2022-07-21 | Stop reason: SDUPTHER

## 2022-01-19 ASSESSMENT — ENCOUNTER SYMPTOMS
ABDOMINAL PAIN: 0
WHEEZING: 0
SHORTNESS OF BREATH: 0
PHOTOPHOBIA: 0
NAUSEA: 0
VOMITING: 0

## 2022-01-19 ASSESSMENT — PATIENT HEALTH QUESTIONNAIRE - PHQ9
SUM OF ALL RESPONSES TO PHQ9 QUESTIONS 1 & 2: 0
SUM OF ALL RESPONSES TO PHQ QUESTIONS 1-9: 0
1. LITTLE INTEREST OR PLEASURE IN DOING THINGS: 0
SUM OF ALL RESPONSES TO PHQ QUESTIONS 1-9: 0
SUM OF ALL RESPONSES TO PHQ QUESTIONS 1-9: 0
5. POOR APPETITE OR OVEREATING: 0
6. FEELING BAD ABOUT YOURSELF - OR THAT YOU ARE A FAILURE OR HAVE LET YOURSELF OR YOUR FAMILY DOWN: 0
3. TROUBLE FALLING OR STAYING ASLEEP: 0
9. THOUGHTS THAT YOU WOULD BE BETTER OFF DEAD, OR OF HURTING YOURSELF: 0
8. MOVING OR SPEAKING SO SLOWLY THAT OTHER PEOPLE COULD HAVE NOTICED. OR THE OPPOSITE, BEING SO FIGETY OR RESTLESS THAT YOU HAVE BEEN MOVING AROUND A LOT MORE THAN USUAL: 0
4. FEELING TIRED OR HAVING LITTLE ENERGY: 0
SUM OF ALL RESPONSES TO PHQ QUESTIONS 1-9: 0
10. IF YOU CHECKED OFF ANY PROBLEMS, HOW DIFFICULT HAVE THESE PROBLEMS MADE IT FOR YOU TO DO YOUR WORK, TAKE CARE OF THINGS AT HOME, OR GET ALONG WITH OTHER PEOPLE: 0
7. TROUBLE CONCENTRATING ON THINGS, SUCH AS READING THE NEWSPAPER OR WATCHING TELEVISION: 0
2. FEELING DOWN, DEPRESSED OR HOPELESS: 0
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
## 2022-03-05 DIAGNOSIS — K21.9 HIATAL HERNIA WITH GERD: ICD-10-CM

## 2022-03-05 DIAGNOSIS — K44.9 HIATAL HERNIA WITH GERD: ICD-10-CM

## 2022-03-07 RX ORDER — PANTOPRAZOLE SODIUM 20 MG/1
TABLET, DELAYED RELEASE ORAL
Qty: 90 TABLET | Refills: 1 | OUTPATIENT
Start: 2022-03-07

## 2022-03-17 ENCOUNTER — HOSPITAL ENCOUNTER (OUTPATIENT)
Dept: GENERAL RADIOLOGY | Age: 67
Discharge: HOME | End: 2022-03-17
Payer: MEDICARE

## 2022-03-17 ENCOUNTER — HOSPITAL ENCOUNTER (OUTPATIENT)
Age: 67
Discharge: HOME | End: 2022-03-17
Payer: MEDICARE

## 2022-03-17 ENCOUNTER — OFFICE VISIT (OUTPATIENT)
Dept: INTERNAL MEDICINE CLINIC | Age: 67
End: 2022-03-17
Payer: MEDICARE

## 2022-03-17 VITALS
BODY MASS INDEX: 22.39 KG/M2 | DIASTOLIC BLOOD PRESSURE: 80 MMHG | WEIGHT: 134.4 LBS | HEART RATE: 75 BPM | HEIGHT: 65 IN | SYSTOLIC BLOOD PRESSURE: 125 MMHG | OXYGEN SATURATION: 95 %

## 2022-03-17 DIAGNOSIS — M25.532 LEFT WRIST PAIN: Primary | ICD-10-CM

## 2022-03-17 DIAGNOSIS — M50.121 CERVICAL DISC DISORDER AT C4-C5 LEVEL WITH RADICULOPATHY: ICD-10-CM

## 2022-03-17 DIAGNOSIS — M25.532 LEFT WRIST PAIN: ICD-10-CM

## 2022-03-17 PROCEDURE — G8427 DOCREV CUR MEDS BY ELIG CLIN: HCPCS | Performed by: INTERNAL MEDICINE

## 2022-03-17 PROCEDURE — 4040F PNEUMOC VAC/ADMIN/RCVD: CPT | Performed by: INTERNAL MEDICINE

## 2022-03-17 PROCEDURE — G8484 FLU IMMUNIZE NO ADMIN: HCPCS | Performed by: INTERNAL MEDICINE

## 2022-03-17 PROCEDURE — 73110 X-RAY EXAM OF WRIST: CPT

## 2022-03-17 PROCEDURE — 99214 OFFICE O/P EST MOD 30 MIN: CPT | Performed by: INTERNAL MEDICINE

## 2022-03-17 PROCEDURE — 4004F PT TOBACCO SCREEN RCVD TLK: CPT | Performed by: INTERNAL MEDICINE

## 2022-03-17 PROCEDURE — 1123F ACP DISCUSS/DSCN MKR DOCD: CPT | Performed by: INTERNAL MEDICINE

## 2022-03-17 PROCEDURE — 3017F COLORECTAL CA SCREEN DOC REV: CPT | Performed by: INTERNAL MEDICINE

## 2022-03-17 PROCEDURE — G8420 CALC BMI NORM PARAMETERS: HCPCS | Performed by: INTERNAL MEDICINE

## 2022-03-17 RX ORDER — CEPHALEXIN 500 MG/1
500 CAPSULE ORAL 3 TIMES DAILY
Qty: 21 CAPSULE | Refills: 0 | Status: SHIPPED | OUTPATIENT
Start: 2022-03-17 | End: 2022-07-21

## 2022-03-17 RX ORDER — PREDNISONE 20 MG/1
60 TABLET ORAL DAILY
COMMUNITY
Start: 2022-03-16 | End: 2022-03-21

## 2022-03-17 ASSESSMENT — ENCOUNTER SYMPTOMS
TROUBLE SWALLOWING: 0
CHEST TIGHTNESS: 0
VOICE CHANGE: 0
WHEEZING: 0
SHORTNESS OF BREATH: 0
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
## 2022-03-18 ENCOUNTER — TELEPHONE (OUTPATIENT)
Dept: INTERNAL MEDICINE CLINIC | Age: 67
End: 2022-03-18

## 2022-03-18 DIAGNOSIS — M11.242 PSEUDOGOUT OF JOINT OF LEFT HAND: Primary | ICD-10-CM

## 2022-03-18 DIAGNOSIS — M19.032 OSTEOARTHRITIS OF LEFT WRIST, UNSPECIFIED OSTEOARTHRITIS TYPE: ICD-10-CM

## 2022-03-18 RX ORDER — NAPROXEN 500 MG/1
500 TABLET ORAL 2 TIMES DAILY WITH MEALS
Qty: 60 TABLET | Refills: 0 | Status: SHIPPED | OUTPATIENT
Start: 2022-03-18 | End: 2022-04-18

## 2022-03-18 NOTE — TELEPHONE ENCOUNTER
Yolanda Michele stated that she faxed the referral for [facility] yesterday. I also informed him of the new referrall for rheumatology re: lt wrist and hand.

## 2022-03-18 NOTE — TELEPHONE ENCOUNTER
----- Message from Manoj Linton sent at 3/18/2022  9:09 AM EDT -----  Subject: Message to Provider    QUESTIONS  Information for Provider? pt is calling in asking if the referral Dr. Randene Baumgarten has for pt could be faxed to [phone]? [facility]. pt would like a call when this is complete.  ---------------------------------------------------------------------------  --------------  CALL BACK INFO  What is the best way for the office to contact you? OK to leave message on   voicemail  Preferred Call Back Phone Number? [phone]  ---------------------------------------------------------------------------  --------------  SCRIPT ANSWERS  Relationship to Patient?  Self

## 2022-03-18 NOTE — RESULT ENCOUNTER NOTE
Patient  have degenerative arthritis.   Also have pseudogout in the left wrist  Rheumatology referral  Once he finished prednisone he should start naproxen 500 mg twice daily

## 2022-03-20 PROBLEM — M19.012 ARTHRITIS OF LEFT ACROMIOCLAVICULAR JOINT: Status: ACTIVE | Noted: 2022-03-20

## 2022-04-16 DIAGNOSIS — M19.032 OSTEOARTHRITIS OF LEFT WRIST, UNSPECIFIED OSTEOARTHRITIS TYPE: ICD-10-CM

## 2022-04-16 DIAGNOSIS — M11.242 PSEUDOGOUT OF JOINT OF LEFT HAND: ICD-10-CM

## 2022-04-18 RX ORDER — NAPROXEN 500 MG/1
TABLET ORAL
Qty: 60 TABLET | Refills: 2 | Status: SHIPPED | OUTPATIENT
Start: 2022-04-18 | End: 2022-07-21

## 2022-06-12 DIAGNOSIS — I10 ESSENTIAL HYPERTENSION: ICD-10-CM

## 2022-06-13 RX ORDER — AMLODIPINE BESYLATE 10 MG/1
TABLET ORAL
Qty: 90 TABLET | Refills: 0 | Status: SHIPPED | OUTPATIENT
Start: 2022-06-13 | End: 2022-09-12

## 2022-06-14 DIAGNOSIS — E78.5 DYSLIPIDEMIA: ICD-10-CM

## 2022-06-14 DIAGNOSIS — Z86.73 H/O: CVA (CEREBROVASCULAR ACCIDENT): ICD-10-CM

## 2022-06-14 DIAGNOSIS — I10 ESSENTIAL HYPERTENSION: ICD-10-CM

## 2022-06-14 RX ORDER — ATORVASTATIN CALCIUM 20 MG/1
TABLET, FILM COATED ORAL
Qty: 90 TABLET | Refills: 1 | Status: SHIPPED | OUTPATIENT
Start: 2022-06-14

## 2022-06-14 RX ORDER — LOSARTAN POTASSIUM 100 MG/1
TABLET ORAL
Qty: 90 TABLET | Refills: 1 | Status: SHIPPED | OUTPATIENT
Start: 2022-06-14

## 2022-06-16 ENCOUNTER — OFFICE VISIT (OUTPATIENT)
Dept: INTERNAL MEDICINE CLINIC | Age: 67
End: 2022-06-16
Payer: MEDICARE

## 2022-06-16 VITALS
HEIGHT: 65 IN | WEIGHT: 126.2 LBS | OXYGEN SATURATION: 97 % | SYSTOLIC BLOOD PRESSURE: 148 MMHG | HEART RATE: 105 BPM | BODY MASS INDEX: 21.02 KG/M2 | DIASTOLIC BLOOD PRESSURE: 88 MMHG

## 2022-06-16 DIAGNOSIS — I10 ESSENTIAL HYPERTENSION: ICD-10-CM

## 2022-06-16 DIAGNOSIS — M47.812 SPONDYLOSIS OF CERVICAL REGION WITHOUT MYELOPATHY OR RADICULOPATHY: ICD-10-CM

## 2022-06-16 DIAGNOSIS — M62.838 CERVICAL PARASPINAL MUSCLE SPASM: Primary | ICD-10-CM

## 2022-06-16 PROCEDURE — 99214 OFFICE O/P EST MOD 30 MIN: CPT | Performed by: INTERNAL MEDICINE

## 2022-06-16 PROCEDURE — G8420 CALC BMI NORM PARAMETERS: HCPCS | Performed by: INTERNAL MEDICINE

## 2022-06-16 PROCEDURE — 1123F ACP DISCUSS/DSCN MKR DOCD: CPT | Performed by: INTERNAL MEDICINE

## 2022-06-16 PROCEDURE — 3017F COLORECTAL CA SCREEN DOC REV: CPT | Performed by: INTERNAL MEDICINE

## 2022-06-16 PROCEDURE — 4004F PT TOBACCO SCREEN RCVD TLK: CPT | Performed by: INTERNAL MEDICINE

## 2022-06-16 PROCEDURE — 96372 THER/PROPH/DIAG INJ SC/IM: CPT | Performed by: INTERNAL MEDICINE

## 2022-06-16 PROCEDURE — G8427 DOCREV CUR MEDS BY ELIG CLIN: HCPCS | Performed by: INTERNAL MEDICINE

## 2022-06-16 RX ORDER — TIZANIDINE 2 MG/1
2 TABLET ORAL EVERY 8 HOURS PRN
Qty: 90 TABLET | Refills: 0 | Status: SHIPPED | OUTPATIENT
Start: 2022-06-16

## 2022-06-16 RX ORDER — PREDNISONE 20 MG/1
20 TABLET ORAL DAILY
Qty: 5 TABLET | Refills: 0 | Status: SHIPPED | OUTPATIENT
Start: 2022-06-16 | End: 2022-06-21

## 2022-06-16 RX ORDER — TRIAMCINOLONE ACETONIDE 40 MG/ML
40 INJECTION, SUSPENSION INTRA-ARTICULAR; INTRAMUSCULAR ONCE
Status: COMPLETED | OUTPATIENT
Start: 2022-06-16 | End: 2022-06-16

## 2022-06-16 RX ORDER — GABAPENTIN 300 MG/1
600 CAPSULE ORAL 3 TIMES DAILY
Qty: 90 CAPSULE | Refills: 0 | Status: SHIPPED | OUTPATIENT
Start: 2022-06-16 | End: 2022-07-21

## 2022-06-16 RX ADMIN — TRIAMCINOLONE ACETONIDE 40 MG: 40 INJECTION, SUSPENSION INTRA-ARTICULAR; INTRAMUSCULAR at 12:12

## 2022-06-16 ASSESSMENT — ENCOUNTER SYMPTOMS
TROUBLE SWALLOWING: 0
PHOTOPHOBIA: 0
VOICE CHANGE: 0
WHEEZING: 0
SHORTNESS OF BREATH: 0

## 2022-06-16 NOTE — PROGRESS NOTES
Wojciech Edmonds (:  1955) is a 78 y.o. male,Established patient, here for evaluation of the following chief complaint(s):  Hypertension and Migraine (x 3 days)         ASSESSMENT/PLAN:  1. Cervical paraspinal muscle spasm  -     triamcinolone acetonide (KENALOG-40) injection 40 mg; 40 mg, IntraMUSCular, ONCE, 1 dose, On u 22 at 1230  -     tiZANidine (ZANAFLEX) 2 MG tablet; Take 1 tablet by mouth every 8 hours as needed (leg spasm), Disp-90 tablet, R-0Normal  -     gabapentin (NEURONTIN) 300 MG capsule; Take 2 capsules by mouth 3 times daily for 30 days. , Disp-90 capsule, R-0Normal  -     predniSONE (DELTASONE) 20 MG tablet; Take 1 tablet by mouth daily for 5 days, Disp-5 tablet, R-0Normal  2. Spondylosis of cervical region without myelopathy or radiculopathy  -     triamcinolone acetonide (KENALOG-40) injection 40 mg; 40 mg, IntraMUSCular, ONCE, 1 dose, On u 22 at 1230  -     tiZANidine (ZANAFLEX) 2 MG tablet; Take 1 tablet by mouth every 8 hours as needed (leg spasm), Disp-90 tablet, R-0Normal  -     gabapentin (NEURONTIN) 300 MG capsule; Take 2 capsules by mouth 3 times daily for 30 days. , Disp-90 capsule, R-0Normal  -     predniSONE (DELTASONE) 20 MG tablet; Take 1 tablet by mouth daily for 5 days, Disp-5 tablet, R-0Normal  3. Essential hypertension  Slightly elevated blood pressure. Currently patient is in pain. He will continue current losartan and amlodipine and low-dose metoprolol    Return in about 2 weeks (around 2022). Subjective   SUBJECTIVE/OBJECTIVE:  HPI  History of chronic neck pain with cervical degenerative disc disease. History of multiple epidural injection. Patient report over the last 3 days having increasing pain at the neck. Also getting headache all over. He denies any other neurologic symptoms. Over-the-counter Tylenol has not been helping.   In the past he used to take gabapentin muscle relaxant  Patient denies any bladder or bowel cranial nerve deficit. Motor: No weakness. Coordination: Coordination normal.      Gait: Gait normal.      Deep Tendon Reflexes: Reflexes normal.   Psychiatric:         Mood and Affect: Mood normal.         Behavior: Behavior normal.          An After Visit Summary was printed and given to the patient. Documentation was done using voice recognition dragon software. Every effort was made to ensure accuracy; however, inadvertent  Unintentional computerized transcription errors may be present. An electronic signature was used to authenticate this note.     --Marcia Whitney MD

## 2022-07-21 ENCOUNTER — TELEPHONE (OUTPATIENT)
Dept: INTERNAL MEDICINE CLINIC | Age: 67
End: 2022-07-21

## 2022-07-21 ENCOUNTER — OFFICE VISIT (OUTPATIENT)
Dept: INTERNAL MEDICINE CLINIC | Age: 67
End: 2022-07-21
Payer: MEDICARE

## 2022-07-21 VITALS
HEIGHT: 65 IN | HEART RATE: 98 BPM | WEIGHT: 126.6 LBS | BODY MASS INDEX: 21.09 KG/M2 | OXYGEN SATURATION: 94 % | SYSTOLIC BLOOD PRESSURE: 150 MMHG | DIASTOLIC BLOOD PRESSURE: 84 MMHG

## 2022-07-21 DIAGNOSIS — R58 ECCHYMOSIS: ICD-10-CM

## 2022-07-21 DIAGNOSIS — M62.838 CERVICAL PARASPINAL MUSCLE SPASM: ICD-10-CM

## 2022-07-21 DIAGNOSIS — R25.2 LEG CRAMP: ICD-10-CM

## 2022-07-21 DIAGNOSIS — M47.812 SPONDYLOSIS OF CERVICAL REGION WITHOUT MYELOPATHY OR RADICULOPATHY: ICD-10-CM

## 2022-07-21 DIAGNOSIS — I10 ESSENTIAL HYPERTENSION: ICD-10-CM

## 2022-07-21 DIAGNOSIS — R51.9 CHRONIC NONINTRACTABLE HEADACHE, UNSPECIFIED HEADACHE TYPE: ICD-10-CM

## 2022-07-21 DIAGNOSIS — I10 ESSENTIAL HYPERTENSION: Primary | ICD-10-CM

## 2022-07-21 DIAGNOSIS — M54.42 CHRONIC LEFT-SIDED LOW BACK PAIN WITH LEFT-SIDED SCIATICA: ICD-10-CM

## 2022-07-21 DIAGNOSIS — R74.8 ELEVATED LIVER ENZYMES: ICD-10-CM

## 2022-07-21 DIAGNOSIS — G89.29 CHRONIC LEFT-SIDED LOW BACK PAIN WITH LEFT-SIDED SCIATICA: ICD-10-CM

## 2022-07-21 DIAGNOSIS — G89.29 CHRONIC NONINTRACTABLE HEADACHE, UNSPECIFIED HEADACHE TYPE: ICD-10-CM

## 2022-07-21 LAB
ALBUMIN SERPL-MCNC: 4.8 G/DL (ref 3.4–5)
ALP BLD-CCNC: 63 U/L (ref 40–129)
ALT SERPL-CCNC: 19 U/L (ref 10–40)
AST SERPL-CCNC: 27 U/L (ref 15–37)
BASOPHILS ABSOLUTE: 0.1 K/UL (ref 0–0.2)
BASOPHILS RELATIVE PERCENT: 1.3 %
BILIRUB SERPL-MCNC: 0.4 MG/DL (ref 0–1)
BILIRUBIN DIRECT: <0.2 MG/DL (ref 0–0.3)
BILIRUBIN, INDIRECT: NORMAL MG/DL (ref 0–1)
EOSINOPHILS ABSOLUTE: 0 K/UL (ref 0–0.6)
EOSINOPHILS RELATIVE PERCENT: 0.7 %
HCT VFR BLD CALC: 41.4 % (ref 40.5–52.5)
HEMOGLOBIN: 14.3 G/DL (ref 13.5–17.5)
INR BLD: 0.79 (ref 0.87–1.14)
LYMPHOCYTES ABSOLUTE: 0.8 K/UL (ref 1–5.1)
LYMPHOCYTES RELATIVE PERCENT: 12.4 %
MCH RBC QN AUTO: 34.9 PG (ref 26–34)
MCHC RBC AUTO-ENTMCNC: 34.5 G/DL (ref 31–36)
MCV RBC AUTO: 101.2 FL (ref 80–100)
MONOCYTES ABSOLUTE: 0.6 K/UL (ref 0–1.3)
MONOCYTES RELATIVE PERCENT: 10.4 %
NEUTROPHILS ABSOLUTE: 4.6 K/UL (ref 1.7–7.7)
NEUTROPHILS RELATIVE PERCENT: 75.2 %
PDW BLD-RTO: 13.6 % (ref 12.4–15.4)
PLATELET # BLD: 236 K/UL (ref 135–450)
PMV BLD AUTO: 8 FL (ref 5–10.5)
PROTHROMBIN TIME: 10.9 SEC (ref 11.7–14.5)
RBC # BLD: 4.09 M/UL (ref 4.2–5.9)
TOTAL PROTEIN: 6.5 G/DL (ref 6.4–8.2)
WBC # BLD: 6.2 K/UL (ref 4–11)

## 2022-07-21 PROCEDURE — 1123F ACP DISCUSS/DSCN MKR DOCD: CPT | Performed by: INTERNAL MEDICINE

## 2022-07-21 PROCEDURE — 3017F COLORECTAL CA SCREEN DOC REV: CPT | Performed by: INTERNAL MEDICINE

## 2022-07-21 PROCEDURE — G8420 CALC BMI NORM PARAMETERS: HCPCS | Performed by: INTERNAL MEDICINE

## 2022-07-21 PROCEDURE — G8427 DOCREV CUR MEDS BY ELIG CLIN: HCPCS | Performed by: INTERNAL MEDICINE

## 2022-07-21 PROCEDURE — 99214 OFFICE O/P EST MOD 30 MIN: CPT | Performed by: INTERNAL MEDICINE

## 2022-07-21 PROCEDURE — 4004F PT TOBACCO SCREEN RCVD TLK: CPT | Performed by: INTERNAL MEDICINE

## 2022-07-21 RX ORDER — TOPIRAMATE 25 MG/1
TABLET ORAL
Qty: 90 TABLET | Refills: 2 | Status: SHIPPED | OUTPATIENT
Start: 2022-07-21

## 2022-07-21 RX ORDER — ROPINIROLE 0.5 MG/1
TABLET, FILM COATED ORAL
Qty: 90 TABLET | Refills: 1 | Status: SHIPPED | OUTPATIENT
Start: 2022-07-21

## 2022-07-21 RX ORDER — PREDNISONE 20 MG/1
20 TABLET ORAL DAILY
Status: ON HOLD | COMMUNITY
End: 2022-09-18 | Stop reason: HOSPADM

## 2022-07-21 RX ORDER — METOPROLOL TARTRATE 50 MG/1
50 TABLET, FILM COATED ORAL 2 TIMES DAILY
Qty: 60 TABLET | Refills: 2 | Status: SHIPPED | OUTPATIENT
Start: 2022-07-21

## 2022-07-21 RX ORDER — GABAPENTIN 300 MG/1
600 CAPSULE ORAL 3 TIMES DAILY
Qty: 90 CAPSULE | Refills: 2 | Status: SHIPPED | OUTPATIENT
Start: 2022-07-21 | End: 2022-08-20

## 2022-07-21 RX ORDER — METOPROLOL TARTRATE 50 MG/1
50 TABLET, FILM COATED ORAL 2 TIMES DAILY
Qty: 60 TABLET | Refills: 2 | Status: SHIPPED | OUTPATIENT
Start: 2022-07-21 | End: 2022-07-21 | Stop reason: SDUPTHER

## 2022-07-21 ASSESSMENT — ENCOUNTER SYMPTOMS
VOMITING: 0
TROUBLE SWALLOWING: 0
VOICE CHANGE: 0
NAUSEA: 0
ABDOMINAL PAIN: 0
WHEEZING: 0
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
## 2022-07-21 NOTE — TELEPHONE ENCOUNTER
Dinorah calling about the Metoprolol---it has has wrong directions on it--and 2 sets--please call Dinorah to fix. Thanks.

## 2022-07-22 DIAGNOSIS — I25.10 CORONARY ARTERY DISEASE INVOLVING NATIVE HEART WITHOUT ANGINA PECTORIS, UNSPECIFIED VESSEL OR LESION TYPE: ICD-10-CM

## 2022-07-22 RX ORDER — ASPIRIN 81 MG/1
TABLET, COATED ORAL
Qty: 90 TABLET | Refills: 1 | Status: SHIPPED | OUTPATIENT
Start: 2022-07-22 | End: 2022-07-25

## 2022-07-25 DIAGNOSIS — I25.10 CORONARY ARTERY DISEASE INVOLVING NATIVE HEART WITHOUT ANGINA PECTORIS, UNSPECIFIED VESSEL OR LESION TYPE: ICD-10-CM

## 2022-07-25 RX ORDER — ASPIRIN 81 MG/1
TABLET, COATED ORAL
Qty: 90 TABLET | Refills: 1 | Status: SHIPPED | OUTPATIENT
Start: 2022-07-25

## 2022-08-29 NOTE — PATIENT INSTRUCTIONS
Personalized Preventive Plan for Mora Cortés - 12/30/2020  Medicare offers a range of preventive health benefits. Some of the tests and screenings are paid in full while other may be subject to a deductible, co-insurance, and/or copay. Some of these benefits include a comprehensive review of your medical history including lifestyle, illnesses that may run in your family, and various assessments and screenings as appropriate. After reviewing your medical record and screening and assessments performed today your provider may have ordered immunizations, labs, imaging, and/or referrals for you. A list of these orders (if applicable) as well as your Preventive Care list are included within your After Visit Summary for your review. Other Preventive Recommendations:    · A preventive eye exam performed by an eye specialist is recommended every 1-2 years to screen for glaucoma; cataracts, macular degeneration, and other eye disorders. · A preventive dental visit is recommended every 6 months. · Try to get at least 150 minutes of exercise per week or 10,000 steps per day on a pedometer . · Order or download the FREE \"Exercise & Physical Activity: Your Everyday Guide\" from The [employer]. Call 3-119.763.6203 or search The [employer] online. · You need 5704-1265 mg of calcium and 5140-7049 IU of vitamin D per day. It is possible to meet your calcium requirement with diet alone, but a vitamin D supplement is usually necessary to meet this goal.  · When exposed to the sun, use a sunscreen that protects against both UVA and UVB radiation with an SPF of 30 or greater. Reapply every 2 to 3 hours or after sweating, drying off with a towel, or swimming. · Always wear a seat belt when traveling in a car. Always wear a helmet when riding a bicycle or motorcycle. Alcohol Abuse and Alcoholism   (Alcohol Dependence;  Alcohol Use Disorder)       Definition The brain, nervous system, heart, liver, stomach, gastrointestinal tract, and pancreas can all be damaged by alcoholism. Some of the Organs Damaged in Alcohol Abuse        2011 [facility].   Diagnosis   Doctors ask a series of questions to assess possible alcohol-related problems, including:   Have you tried to reduce your drinking? Have you felt bad about drinking? Have you been annoyed by another person's criticism of your drinking? Do you drink in the morning to steady your nerves or cure a hangover? Do you have problems with a job, your family, or the law? Do you drive under the influence of alcohol? Blood tests may be done to:   Look at the size of your red blood cells and to check for a substance called carbohydrate-deficient transferrin   Check for alcohol-related liver disease and other health problems   Treatment   Treatment for alcohol abuse or dependence is aimed at teaching patients how to manage the disease. Most professionals believe that this means giving up alcohol completely and permanently. The first and most important step is recognizing a problem exists. Successful treatment depends on your desire to change. Your doctor can help you withdraw from alcohol safely. This could require hospitalization in a detoxification center. They will carefully monitor you for side effects. You may need medication while you are undergoing detoxification. Treatments include:   Medications    Drugs can help relieve some of the symptoms of withdrawal and help prevent relapse. The doctor may prescribe medication to reduce cravings for alcohol.    Medications used to treat alcoholism and to try to prevent drinking include:   Naltrexone (ReVia, Vivitrol)blocks the high that makes you crave alcohol   Disulfiram (Antabuse)makes you very sick if you drink alcohol   Acamprosate (Campral)reduces your craving for alcohol A study showed that an anticonvulsant drug, topiramate (Topamax), may reduce alcohol dependence. Education and Counseling    Therapy helps you to recognize alcohol's dangers. Education raises awareness of underlying issues and lifestyles that promote drinking. In therapy, you work to improve coping skills and learn other ways of dealing with stress or pain. Mentoring and Community Help    Alcoholics Anonymous (AA) helps many people to stop drinking and stay sober. Members meet regularly and support each other. Your family members may also benefit from attending meetings of [employer]. Living with an alcoholic can be a painful, stressful situation. Here are some general statistics on treatment outcomes of individuals one year after attempting to stop drinkin/3 remained abstinent   1/3 resumed drinking but at a lower level   1/3 relapsed completely   If you are diagnosed with alcohol abuse or alcoholism, follow your doctor's instructions . Prevention   Realizing that alcohol causes problems helps some people avoid it. Suggestions to decrease the risk of alcohol abuse and dependence include:   Socialize without alcohol. Avoid going to bars. Do not keep alcohol in your home. Avoid situations and people that encourage drinking. Make new nondrinking friends. Do fun things that do not involve alcohol. Avoid reaching for a drink when stressed or upset. Limit your alcohol intake to a moderate level. Moderate is two or fewer drinks per day for men and one or fewer for women and older adults   A 12-ounce bottle of beer, a five-ounce glass of wine, or 1.5 ounces of liquor is considered one drink   If you are a parent, having a good relationship with your children may reduce their risk of alcohol abuse. Most professionals who treat alcohol abuse and dependence believe that complete abstinence is the only effective prevention.      Last Reviewed: 2010 Mihaela Sanchez MD, PhD, MPH Updated: 9/20/2010   ·     Keep Your Memory Vielka Johnson       Many factors can affect your ability to remembera hectic lifestyle, aging, stress, chronic disease, and certain medicines. But, there are steps you can take to sharpen your mind and help preserve your memory. Challenge Your Brain   Regularly challenging your mind may help keeps it in top shape. Good mental exercises include:   Crossword puzzlesUse a dictionary if you need it; you will learn more that way. Brainteasers Try some! Crafts, such as wood working and sewing   Hobbies, such as gardening and building model airplanes   SocializingVisit old friends or join groups to meet new ones. Reading   Learning a new language   Taking a class, whether it be art history or timothy chi   TravelingExperience the food, history, and culture of your destination   Learning to use a computer   Going to museums, the theater, or thought-provoking movies   Changing things in your daily life, such as reversing your pattern in the grocery store or brushing your teeth using your nondominant hand   Use Memory Aids   There is no need to remember every detail on your own. These memory aids can help:   Calendars and day planners   Electronic organizers to store all sorts of helpful informationThese devices can \"beep\" to remind you of appointments. A book of days to record birthdays, anniversaries, and other occasions that occur on the same date every year   Detailed \"to-do\" lists and strategically placed sticky notes   Quick \"study\" sessionsBefore a gathering, review who will be there so their names will be fresh in your mind. Establish routinesFor example, keep your keys, wallet, and umbrella in the same place all the time or take medicine with your 8:00 AM glass of juice   Live a Healthy Life   Many actions that will keep your body strong will do the same for your mind.  For example:   Talk to Your Doctor About Herbs and Supplements no Malnutrition and vitamin deficiencies can impair your mental function. For example, vitamin B12 deficiency can cause a range of symptoms, including confusion. But, what if your nutritional needs are being met? Can herbs and supplements still offer a benefit? Researchers have investigated a range of natural remedies, such as ginkgo , ginseng , and the supplement phosphatidylserine (PS). So far, though, the evidence is inconsistent as to whether these products can improve memory or thinking. If you are interested in taking herbs and supplements, talk to your doctor first because they may interact with other medicines that you are taking. Exercise Regularly    Among the many benefits of regular exercise are increased blood flow to the brain and decreased risk of certain diseases that can interfere with memory function. One study found that even moderate exercise has a beneficial effect. Examples of \"moderate\" exercise include:   Playing 18 holes of golf once a week, without a cart   Playing tennis twice a week   Walking one mile per day   Manage Stress    It can be tough to remember what is important when your mind is cluttered. Make time for relaxation. Choose activities that calm you down, and make it routine. Manage Chronic Conditions    Side effects of high blood pressure , diabetes, and heart disease can interfere with mental function. Many of the lifestyle steps discussed here can help manage these conditions. Strive to eat a healthy diet, exercise regularly, get stress under control, and follow your doctor's advice for your condition. Minimize Medications    Talk to your doctor about the medicines that you take. Some may be unnecessary. Also, healthy lifestyle habits may lower the need for certain drugs.      Last Reviewed: April 2010 Rut Chairez MD   Updated: 4/13/2010   ·     Keeping Home a [facility] To avoid dizziness, get up slowly from a lying down position. Sit up first, dangling your legs for a minute or two before rising to a standing position. Overall Home Safety Check   According to the Consumer Product Safety Commision's \"Older Consumer Home Safety Checklist,\" it is important to check for potential hazards in each room. And remember, proper lighting is an essential factor in home safety. If you cannot see clearly, you are more likely to fall. Important questions to ask yourself include:   Are lamp, electric, extension, and telephone cords placed out of the flow of traffic and maintained in good condition? Have frayed cords been replaced? Are all small rugs and runners slip resistant? If not, you can secure them to the floor with a special double-sided carpet tape. Are smoke detectors properly locatedone on every floor of your home and one outside of every sleeping area? Are they in good working order? Are batteries replaced at least once a year? Do you have a well-maintained carbon monoxide detector outside every sleeping are in your home? Does your furniture layout leave plenty of space to maneuver between and around chairs, tables, beds, and sofas? Are hallways, stairs and passages between rooms well lit? Can you reach a lamp without getting out of bed? Are floor surfaces well maintained? Shag rugs, high-pile carpeting, tile floors, and polished wood floors can be particularly slippery. Stairs should always have handrails and be carpeted or fitted with a non-skid tread. Is your telephone easily reachable. Is the cord safely tucked away? Room by Room   According to the Association of Aging, bathrooms and abigail are the two most potentially hazardous rooms in your home. In the Kitchen    Be sure your stove is in proper working order and always make sure burners and the oven are off before you go out or go to sleep. Keep pots on the back burners, turn handles away from the front of the stove, and keep stove clean and free of grease build-up. Kitchen ventilation systems and range exhausts should be working properly. Keep flammable objects such as towels and pot holders away from the cooking area except when in use. Make sure kitchen curtains are tied back. Move cords and appliances away from the sink and hot surfaces. If extension cords are needed, install wiring guides so they do not hang over the sink, range, or working areas. Look for coffee pots, kettles and toaster ovens with automatic shut-offs. Keep a mop handy in the kitchen so you can wipe up spills instantly. You should also have a small fire extinguisher. Arrange your kitchen with frequently used items on lower shelves to avoid the need to stand on a stepstool to reach them. Make sure countertops are well-lit to avoid injuries while cutting and preparing food. In the Bathroom    Use a non-slip mat or decals in the tub and shower, since wet, soapy tile or porcelain surfaces are extremely slippery. Make sure bathroom rugs are non-skid or tape them firmly to the floor. Bathtubs should have at least one, preferably two, grab bars, firmly attached to structural supports in the wall. (Do not use built-in soap holders or glass shower doors as grab bars.)    Tub seats fitted with non-slip material on the legs allow you to wash sitting down. For people with limited mobility, bathtub transfer benches allow you to slide safely into the tub. Raised toilet seats and toilet safety rails are helpful for those with knee or hip problems. In the [facility]    Make sure you use a nightlight and that the area around your bed is clear of potential obstacles. Be careful with electric blankets and never go to sleep with a heating pad, which can cause serious burns even if on a low setting. Use fire-resistant mattress covers and pillows, and NEVER smoke in bed. Keep a phone next to the bed that is programmed to dial 911 at the push of a button. If you have a chronic condition, you may want to sign on with an automatic call-in service. Typically the system includes a small pendant that connects directly to an emergency medical voice-response system. You should also make arrangements to stay in contact with someonefriend, neighbor, family memberon a regular schedule. Fire Prevention   According to the [employer] (Smoke Alarms for Every) [address] [place] [address], senior citizens are one of the two highest risk groups for death and serious injuries due to residential fires. When cooking, wear short-sleeved items, never a bulky long-sleeved robe. The [facility]'s Safety Checklist for Older Consumers emphasizes the importance of checking basements, garages, workshops and storage areas for fire hazards, such as volatile liquids, piles of old rags or clothing and overloaded circuits. Never smoke in bed or when lying down on a couch or recliner chair. Small portable electric or kerosene heaters are responsible for many home fires and should be used cautiously if at all. If you do use one, be sure to keep them away from flammable materials. In case of fire, make sure you have a pre-established emergency exit plan. Have a professional check your fireplace and other fuel-burning appliances yearly.     [employer] Baby boomers entering the barber years will continue to see the development of new products to help older adults live safely and independently in spite of age-related changes. Making Life More Livable  , by [employer], lists over 1,000 products for \"living well in the mature years,\" such as bathing and mobility aids, household security devices, ergonomically designed knives and peelers, and faucet valves and knobs for temperature control. Medical supply stores and organizations are good sources of information about products that improve your quality of life and insure your safety. Last Reviewed: November 2009 Emile Alba MD   Updated: 3/7/2011     ·   Personalized Preventive Plan for Dolan Goltz - 12/30/2020  Medicare offers a range of preventive health benefits. Some of the tests and screenings are paid in full while other may be subject to a deductible, co-insurance, and/or copay. Some of these benefits include a comprehensive review of your medical history including lifestyle, illnesses that may run in your family, and various assessments and screenings as appropriate. After reviewing your medical record and screening and assessments performed today your provider may have ordered immunizations, labs, imaging, and/or referrals for you. A list of these orders (if applicable) as well as your Preventive Care list are included within your After Visit Summary for your review. Other Preventive Recommendations:    A preventive eye exam performed by an eye specialist is recommended every 1-2 years to screen for glaucoma; cataracts, macular degeneration, and other eye disorders. A preventive dental visit is recommended every 6 months. Try to get at least 150 minutes of exercise per week or 10,000 steps per day on a pedometer . Order or download the FREE \"Exercise & Physical Activity: Your Everyday Guide\" from The "[employer]. Call 8-693.847.4943 or search The "[employer] Data on Aging online. You need 9953-2253 mg of calcium and 6016-6812 IU of vitamin D per day. It is possible to meet your calcium requirement with diet alone, but a vitamin D supplement is usually necessary to meet this goal.  When exposed to the sun, use a sunscreen that protects against both UVA and UVB radiation with an SPF of 30 or greater. Reapply every 2 to 3 hours or after sweating, drying off with a towel, or swimming. Always wear a seat belt when traveling in a car. Always wear a helmet when riding a bicycle or motorcycle.

## 2022-09-12 DIAGNOSIS — I10 ESSENTIAL HYPERTENSION: ICD-10-CM

## 2022-09-12 RX ORDER — AMLODIPINE BESYLATE 10 MG/1
TABLET ORAL
Qty: 90 TABLET | Refills: 1 | Status: SHIPPED | OUTPATIENT
Start: 2022-09-12

## 2022-09-15 ENCOUNTER — APPOINTMENT (OUTPATIENT)
Dept: CT IMAGING | Age: 67
DRG: 565 | End: 2022-09-15
Payer: MEDICARE

## 2022-09-15 ENCOUNTER — APPOINTMENT (OUTPATIENT)
Dept: GENERAL RADIOLOGY | Age: 67
DRG: 565 | End: 2022-09-15
Payer: MEDICARE

## 2022-09-15 ENCOUNTER — HOSPITAL ENCOUNTER (INPATIENT)
Age: 67
LOS: 2 days | Discharge: HOME | DRG: 565 | End: 2022-09-18
Attending: EMERGENCY MEDICINE | Admitting: INTERNAL MEDICINE
Payer: MEDICARE

## 2022-09-15 DIAGNOSIS — J43.9 PULMONARY EMPHYSEMA, UNSPECIFIED EMPHYSEMA TYPE (HCC): ICD-10-CM

## 2022-09-15 DIAGNOSIS — R33.9 URINARY RETENTION: ICD-10-CM

## 2022-09-15 DIAGNOSIS — J43.1 PANLOBULAR EMPHYSEMA (HCC): Chronic | ICD-10-CM

## 2022-09-15 DIAGNOSIS — S22.080G COMPRESSION FRACTURE OF T12 VERTEBRA WITH DELAYED HEALING, SUBSEQUENT ENCOUNTER: ICD-10-CM

## 2022-09-15 DIAGNOSIS — S22.080A COMPRESSION FRACTURE OF T12 VERTEBRA, INITIAL ENCOUNTER (HCC): ICD-10-CM
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
## 2022-09-15 DIAGNOSIS — E87.1 HYPONATREMIA: ICD-10-CM

## 2022-09-15 LAB
A/G RATIO: 1.8 (ref 1.1–2.2)
ALBUMIN SERPL-MCNC: 4.7 G/DL (ref 3.4–5)
ALP BLD-CCNC: 91 U/L (ref 40–129)
ALT SERPL-CCNC: 52 U/L (ref 10–40)
ANION GAP SERPL CALCULATED.3IONS-SCNC: 16 MMOL/L (ref 3–16)
APTT: 28.4 SEC (ref 23–34.3)
AST SERPL-CCNC: 55 U/L (ref 15–37)
BACTERIA: NORMAL /HPF
BASOPHILS ABSOLUTE: 0.1 K/UL (ref 0–0.2)
BASOPHILS RELATIVE PERCENT: 0.6 %
BILIRUB SERPL-MCNC: 0.9 MG/DL (ref 0–1)
BILIRUBIN URINE: NEGATIVE
BLOOD, URINE: NEGATIVE
BUN BLDV-MCNC: 5 MG/DL (ref 7–20)
CALCIUM SERPL-MCNC: 9.8 MG/DL (ref 8.3–10.6)
CHLORIDE BLD-SCNC: 92 MMOL/L (ref 99–110)
CLARITY: ABNORMAL
CO2: 17 MMOL/L (ref 21–32)
COLOR: YELLOW
CREAT SERPL-MCNC: 0.6 MG/DL (ref 0.8–1.3)
EOSINOPHILS ABSOLUTE: 0 K/UL (ref 0–0.6)
EOSINOPHILS RELATIVE PERCENT: 0.1 %
EPITHELIAL CELLS, UA: 1 /HPF (ref 0–5)
GFR AFRICAN AMERICAN: >60
GFR NON-AFRICAN AMERICAN: >60
GLUCOSE BLD-MCNC: 117 MG/DL (ref 70–99)
GLUCOSE URINE: NEGATIVE MG/DL
HCT VFR BLD CALC: 41.7 % (ref 40.5–52.5)
HEMOGLOBIN: 14.2 G/DL (ref 13.5–17.5)
HYALINE CASTS: 2 /LPF (ref 0–8)
INR BLD: 0.93 (ref 0.87–1.14)
KETONES, URINE: ABNORMAL MG/DL
LEUKOCYTE ESTERASE, URINE: NEGATIVE
LIPASE: 32 U/L (ref 13–60)
LYMPHOCYTES ABSOLUTE: 0.7 K/UL (ref 1–5.1)
LYMPHOCYTES RELATIVE PERCENT: 5.6 %
MCH RBC QN AUTO: 33.8 PG (ref 26–34)
MCHC RBC AUTO-ENTMCNC: 34.1 G/DL (ref 31–36)
MCV RBC AUTO: 99.2 FL (ref 80–100)
MICROSCOPIC EXAMINATION: YES
MONOCYTES ABSOLUTE: 0.9 K/UL (ref 0–1.3)
MONOCYTES RELATIVE PERCENT: 7.1 %
NEUTROPHILS ABSOLUTE: 11 K/UL (ref 1.7–7.7)
NEUTROPHILS RELATIVE PERCENT: 86.6 %
NITRITE, URINE: NEGATIVE
PDW BLD-RTO: 12.2 % (ref 12.4–15.4)
PH UA: 5.5 (ref 5–8)
PLATELET # BLD: 217 K/UL (ref 135–450)
PMV BLD AUTO: 7.8 FL (ref 5–10.5)
POTASSIUM SERPL-SCNC: 4.8 MMOL/L (ref 3.5–5.1)
PROTEIN UA: ABNORMAL MG/DL
PROTHROMBIN TIME: 12.4 SEC (ref 11.7–14.5)
RBC # BLD: 4.21 M/UL (ref 4.2–5.9)
RBC UA: 0 /HPF (ref 0–4)
SODIUM BLD-SCNC: 125 MMOL/L (ref 136–145)
SPECIFIC GRAVITY UA: 1.01 (ref 1–1.03)
TOTAL PROTEIN: 7.3 G/DL (ref 6.4–8.2)
URINE REFLEX TO CULTURE: ABNORMAL
URINE TYPE: ABNORMAL
UROBILINOGEN, URINE: 1 E.U./DL
WBC # BLD: 12.7 K/UL (ref 4–11)
WBC UA: 2 /HPF (ref 0–5)

## 2022-09-15 PROCEDURE — 85025 COMPLETE CBC W/AUTO DIFF WBC: CPT

## 2022-09-15 PROCEDURE — 84145 PROCALCITONIN (PCT): CPT

## 2022-09-15 PROCEDURE — 99285 EMERGENCY DEPT VISIT HI MDM: CPT

## 2022-09-15 PROCEDURE — 72125 CT NECK SPINE W/O DYE: CPT

## 2022-09-15 PROCEDURE — 70450 CT HEAD/BRAIN W/O DYE: CPT

## 2022-09-15 PROCEDURE — 96375 TX/PRO/DX INJ NEW DRUG ADDON: CPT

## 2022-09-15 PROCEDURE — 84100 ASSAY OF PHOSPHORUS: CPT

## 2022-09-15 PROCEDURE — 83690 ASSAY OF LIPASE: CPT

## 2022-09-15 PROCEDURE — 51702 INSERT TEMP BLADDER CATH: CPT

## 2022-09-15 PROCEDURE — 6360000002 HC RX W HCPCS: Performed by: PHYSICIAN ASSISTANT

## 2022-09-15 PROCEDURE — 81001 URINALYSIS AUTO W/SCOPE: CPT

## 2022-09-15 PROCEDURE — 96374 THER/PROPH/DIAG INJ IV PUSH: CPT

## 2022-09-15 PROCEDURE — 3209999900 CT LUMBAR SPINE TRAUMA RECONSTRUCTION

## 2022-09-15 PROCEDURE — 85610 PROTHROMBIN TIME: CPT

## 2022-09-15 PROCEDURE — 73030 X-RAY EXAM OF SHOULDER: CPT

## 2022-09-15 PROCEDURE — 74177 CT ABD & PELVIS W/CONTRAST: CPT

## 2022-09-15 PROCEDURE — 84300 ASSAY OF URINE SODIUM: CPT

## 2022-09-15 PROCEDURE — 6360000004 HC RX CONTRAST MEDICATION: Performed by: PHYSICIAN ASSISTANT

## 2022-09-15 PROCEDURE — 80053 COMPREHEN METABOLIC PANEL: CPT

## 2022-09-15 PROCEDURE — 83935 ASSAY OF URINE OSMOLALITY: CPT

## 2022-09-15 PROCEDURE — 83735 ASSAY OF MAGNESIUM: CPT

## 2022-09-15 PROCEDURE — 51798 US URINE CAPACITY MEASURE: CPT

## 2022-09-15 PROCEDURE — 85730 THROMBOPLASTIN TIME PARTIAL: CPT

## 2022-09-15 RX ORDER — ONDANSETRON 2 MG/ML
4 INJECTION INTRAMUSCULAR; INTRAVENOUS ONCE
Status: COMPLETED | OUTPATIENT
Start: 2022-09-15 | End: 2022-09-16

## 2022-09-15 RX ORDER — ONDANSETRON 2 MG/ML
4 INJECTION INTRAMUSCULAR; INTRAVENOUS EVERY 6 HOURS PRN
Status: DISCONTINUED | OUTPATIENT
Start: 2022-09-15 | End: 2022-09-18 | Stop reason: HOSPADM

## 2022-09-15 RX ORDER — MORPHINE SULFATE 4 MG/ML
4 INJECTION, SOLUTION INTRAMUSCULAR; INTRAVENOUS EVERY 30 MIN PRN
Status: COMPLETED | OUTPATIENT
Start: 2022-09-15 | End: 2022-09-16

## 2022-09-15 RX ADMIN — MORPHINE SULFATE 4 MG: 4 INJECTION, SOLUTION INTRAMUSCULAR; INTRAVENOUS at 20:41

## 2022-09-15 RX ADMIN — ONDANSETRON 4 MG: 2 INJECTION INTRAMUSCULAR; INTRAVENOUS at 20:40

## 2022-09-15 RX ADMIN — IOPAMIDOL 75 ML: 755 INJECTION, SOLUTION INTRAVENOUS at 22:01

## 2022-09-15 ASSESSMENT — ENCOUNTER SYMPTOMS
RHINORRHEA: 0
NAUSEA: 0
DIARRHEA: 0
COUGH: 0
SHORTNESS OF BREATH: 0
ABDOMINAL PAIN: 0
VOMITING: 0
BACK PAIN: 1

## 2022-09-15 ASSESSMENT — PAIN SCALES - GENERAL
PAINLEVEL_OUTOF10: 10
PAINLEVEL_OUTOF10: 10

## 2022-09-15 ASSESSMENT — PAIN DESCRIPTION - LOCATION: LOCATION: BACK;ARM

## 2022-09-15 ASSESSMENT — PAIN - FUNCTIONAL ASSESSMENT: PAIN_FUNCTIONAL_ASSESSMENT: 0-10

## 2022-09-15 ASSESSMENT — PAIN DESCRIPTION - DESCRIPTORS: DESCRIPTORS: ACHING

## 2022-09-15 ASSESSMENT — PAIN DESCRIPTION - FREQUENCY: FREQUENCY: CONTINUOUS

## 2022-09-15 ASSESSMENT — PAIN DESCRIPTION - ONSET: ONSET: GRADUAL

## 2022-09-16 ENCOUNTER — APPOINTMENT (OUTPATIENT)
Dept: GENERAL RADIOLOGY | Age: 67
DRG: 565 | End: 2022-09-16
Payer: MEDICARE

## 2022-09-16 PROBLEM — E87.1 ACUTE HYPONATREMIA: Status: ACTIVE | Noted: 2022-09-16

## 2022-09-16 LAB
ACETAMINOPHEN LEVEL: <5 UG/ML (ref 10–30)
ANION GAP SERPL CALCULATED.3IONS-SCNC: 12 MMOL/L (ref 3–16)
ANION GAP SERPL CALCULATED.3IONS-SCNC: 12 MMOL/L (ref 3–16)
ANION GAP SERPL CALCULATED.3IONS-SCNC: 13 MMOL/L (ref 3–16)
BASE EXCESS VENOUS: -1 MMOL/L (ref -3–3)
BASOPHILS ABSOLUTE: 0 K/UL (ref 0–0.2)
BASOPHILS RELATIVE PERCENT: 0.4 %
BUN BLDV-MCNC: 5 MG/DL (ref 7–20)
BUN BLDV-MCNC: 5 MG/DL (ref 7–20)
BUN BLDV-MCNC: 6 MG/DL (ref 7–20)
CALCIUM SERPL-MCNC: 9.1 MG/DL (ref 8.3–10.6)
CALCIUM SERPL-MCNC: 9.3 MG/DL (ref 8.3–10.6)
CALCIUM SERPL-MCNC: 9.5 MG/DL (ref 8.3–10.6)
CARBOXYHEMOGLOBIN: 5.2 % (ref 0–1.5)
CHLORIDE BLD-SCNC: 94 MMOL/L (ref 99–110)
CO2: 21 MMOL/L (ref 21–32)
CO2: 22 MMOL/L (ref 21–32)
CO2: 22 MMOL/L (ref 21–32)
CREAT SERPL-MCNC: 0.5 MG/DL (ref 0.8–1.3)
CREAT SERPL-MCNC: 0.5 MG/DL (ref 0.8–1.3)
CREAT SERPL-MCNC: 0.6 MG/DL (ref 0.8–1.3)
EKG ATRIAL RATE: 82 BPM
EKG DIAGNOSIS: NORMAL
EKG P AXIS: 49 DEGREES
EKG P-R INTERVAL: 148 MS
EKG Q-T INTERVAL: 402 MS
EKG QRS DURATION: 76 MS
EKG QTC CALCULATION (BAZETT): 469 MS
EKG R AXIS: 16 DEGREES
EKG T AXIS: 50 DEGREES
EKG VENTRICULAR RATE: 82 BPM
EOSINOPHILS ABSOLUTE: 0 K/UL (ref 0–0.6)
EOSINOPHILS RELATIVE PERCENT: 0.4 %
ETHANOL: NORMAL MG/DL (ref 0–0.08)
GFR AFRICAN AMERICAN: >60
GFR NON-AFRICAN AMERICAN: >60
GLUCOSE BLD-MCNC: 109 MG/DL (ref 70–99)
GLUCOSE BLD-MCNC: 125 MG/DL (ref 70–99)
GLUCOSE BLD-MCNC: 98 MG/DL (ref 70–99)
HCO3 VENOUS: 22.6 MMOL/L (ref 23–29)
HCT VFR BLD CALC: 39.5 % (ref 40.5–52.5)
HEMOGLOBIN: 13.4 G/DL (ref 13.5–17.5)
LACTIC ACID: 0.6 MMOL/L (ref 0.4–2)
LYMPHOCYTES ABSOLUTE: 0.8 K/UL (ref 1–5.1)
LYMPHOCYTES RELATIVE PERCENT: 9.3 %
MAGNESIUM: 2.1 MG/DL (ref 1.8–2.4)
MCH RBC QN AUTO: 34 PG (ref 26–34)
MCHC RBC AUTO-ENTMCNC: 34.1 G/DL (ref 31–36)
MCV RBC AUTO: 99.7 FL (ref 80–100)
METHEMOGLOBIN VENOUS: 0.1 %
MONOCYTES ABSOLUTE: 0.9 K/UL (ref 0–1.3)
MONOCYTES RELATIVE PERCENT: 11.2 %
NEUTROPHILS ABSOLUTE: 6.6 K/UL (ref 1.7–7.7)
NEUTROPHILS RELATIVE PERCENT: 78.7 %
O2 CONTENT, VEN: 19 VOL %
O2 SAT, VEN: 100 %
O2 THERAPY: ABNORMAL
OSMOLALITY URINE: 345 MOSM/KG (ref 390–1070)
OSMOLALITY: 271 MOSM/KG (ref 280–301)
PCO2, VEN: 33.8 MMHG (ref 40–50)
PDW BLD-RTO: 12.1 % (ref 12.4–15.4)
PH VENOUS: 7.43 (ref 7.35–7.45)
PHOSPHORUS: 3.8 MG/DL (ref 2.5–4.9)
PLATELET # BLD: 193 K/UL (ref 135–450)
PMV BLD AUTO: 7.5 FL (ref 5–10.5)
PO2, VEN: 191 MMHG (ref 25–40)
POTASSIUM REFLEX MAGNESIUM: 3.9 MMOL/L (ref 3.5–5.1)
POTASSIUM REFLEX MAGNESIUM: 4 MMOL/L (ref 3.5–5.1)
POTASSIUM REFLEX MAGNESIUM: 4.9 MMOL/L (ref 3.5–5.1)
PROCALCITONIN: 0.21 NG/ML (ref 0–0.15)
PROCALCITONIN: 0.25 NG/ML (ref 0–0.15)
RBC # BLD: 3.96 M/UL (ref 4.2–5.9)
SALICYLATE, SERUM: <0.3 MG/DL (ref 15–30)
SODIUM BLD-SCNC: 128 MMOL/L (ref 136–145)
SODIUM URINE: <20 MMOL/L
TCO2 CALC VENOUS: 53 MMOL/L
TROPONIN: <0.01 NG/ML
TROPONIN: <0.01 NG/ML
WBC # BLD: 8.4 K/UL (ref 4–11)

## 2022-09-16 PROCEDURE — 83605 ASSAY OF LACTIC ACID: CPT

## 2022-09-16 PROCEDURE — 72072 X-RAY EXAM THORAC SPINE 3VWS: CPT

## 2022-09-16 PROCEDURE — 6360000002 HC RX W HCPCS: Performed by: INTERNAL MEDICINE

## 2022-09-16 PROCEDURE — 1200000000 HC SEMI PRIVATE

## 2022-09-16 PROCEDURE — 80048 BASIC METABOLIC PNL TOTAL CA: CPT

## 2022-09-16 PROCEDURE — 2580000003 HC RX 258: Performed by: INTERNAL MEDICINE

## 2022-09-16 PROCEDURE — 6370000000 HC RX 637 (ALT 250 FOR IP): Performed by: INTERNAL MEDICINE

## 2022-09-16 PROCEDURE — 82077 ASSAY SPEC XCP UR&BREATH IA: CPT

## 2022-09-16 PROCEDURE — 6360000002 HC RX W HCPCS: Performed by: PHYSICIAN ASSISTANT

## 2022-09-16 PROCEDURE — 80179 DRUG ASSAY SALICYLATE: CPT

## 2022-09-16 PROCEDURE — 71045 X-RAY EXAM CHEST 1 VIEW: CPT

## 2022-09-16 PROCEDURE — 80143 DRUG ASSAY ACETAMINOPHEN: CPT

## 2022-09-16 PROCEDURE — 94640 AIRWAY INHALATION TREATMENT: CPT

## 2022-09-16 PROCEDURE — 93010 ELECTROCARDIOGRAM REPORT: CPT | Performed by: INTERNAL MEDICINE

## 2022-09-16 PROCEDURE — 82803 BLOOD GASES ANY COMBINATION: CPT

## 2022-09-16 PROCEDURE — 36415 COLL VENOUS BLD VENIPUNCTURE: CPT

## 2022-09-16 PROCEDURE — 83930 ASSAY OF BLOOD OSMOLALITY: CPT

## 2022-09-16 PROCEDURE — 85025 COMPLETE CBC W/AUTO DIFF WBC: CPT

## 2022-09-16 PROCEDURE — 94761 N-INVAS EAR/PLS OXIMETRY MLT: CPT

## 2022-09-16 PROCEDURE — 87040 BLOOD CULTURE FOR BACTERIA: CPT

## 2022-09-16 PROCEDURE — 6360000002 HC RX W HCPCS: Performed by: NURSE PRACTITIONER

## 2022-09-16 PROCEDURE — 84484 ASSAY OF TROPONIN QUANT: CPT

## 2022-09-16 PROCEDURE — 80201 ASSAY OF TOPIRAMATE: CPT

## 2022-09-16 PROCEDURE — 6360000002 HC RX W HCPCS: Performed by: EMERGENCY MEDICINE

## 2022-09-16 PROCEDURE — 93005 ELECTROCARDIOGRAM TRACING: CPT | Performed by: INTERNAL MEDICINE

## 2022-09-16 PROCEDURE — 84145 PROCALCITONIN (PCT): CPT

## 2022-09-16 RX ORDER — TIZANIDINE 4 MG/1
2 TABLET ORAL EVERY 8 HOURS PRN
Status: DISCONTINUED | OUTPATIENT
Start: 2022-09-16 | End: 2022-09-18 | Stop reason: HOSPADM

## 2022-09-16 RX ORDER — DIAZEPAM 5 MG/1
5 TABLET ORAL
Status: DISCONTINUED | OUTPATIENT
Start: 2022-09-16 | End: 2022-09-18 | Stop reason: HOSPADM

## 2022-09-16 RX ORDER — ENOXAPARIN SODIUM 100 MG/ML
40 INJECTION SUBCUTANEOUS DAILY
Status: DISCONTINUED | OUTPATIENT
Start: 2022-09-17 | End: 2022-09-18 | Stop reason: HOSPADM

## 2022-09-16 RX ORDER — ASPIRIN 81 MG/1
81 TABLET ORAL DAILY
Status: DISCONTINUED | OUTPATIENT
Start: 2022-09-17 | End: 2022-09-18 | Stop reason: HOSPADM

## 2022-09-16 RX ORDER — SODIUM CHLORIDE 9 MG/ML
INJECTION, SOLUTION INTRAVENOUS PRN
Status: DISCONTINUED | OUTPATIENT
Start: 2022-09-16 | End: 2022-09-18 | Stop reason: HOSPADM

## 2022-09-16 RX ORDER — HYDROMORPHONE HYDROCHLORIDE 1 MG/ML
1 INJECTION, SOLUTION INTRAMUSCULAR; INTRAVENOUS; SUBCUTANEOUS EVERY 4 HOURS PRN
Status: DISCONTINUED | OUTPATIENT
Start: 2022-09-16 | End: 2022-09-18 | Stop reason: HOSPADM

## 2022-09-16 RX ORDER — DIAZEPAM 5 MG/1
15 TABLET ORAL
Status: DISCONTINUED | OUTPATIENT
Start: 2022-09-16 | End: 2022-09-18 | Stop reason: HOSPADM

## 2022-09-16 RX ORDER — THIAMINE HYDROCHLORIDE 100 MG/ML
100 INJECTION, SOLUTION INTRAMUSCULAR; INTRAVENOUS ONCE
Status: COMPLETED | OUTPATIENT
Start: 2022-09-16 | End: 2022-09-16

## 2022-09-16 RX ORDER — LORAZEPAM 1 MG/1
2 TABLET ORAL
Status: DISCONTINUED | OUTPATIENT
Start: 2022-09-16 | End: 2022-09-16 | Stop reason: RX

## 2022-09-16 RX ORDER — ALBUTEROL SULFATE 90 UG/1
1 AEROSOL, METERED RESPIRATORY (INHALATION) EVERY 4 HOURS PRN
Status: DISCONTINUED | OUTPATIENT
Start: 2022-09-16 | End: 2022-09-18 | Stop reason: HOSPADM

## 2022-09-16 RX ORDER — LORAZEPAM 1 MG/1
4 TABLET ORAL
Status: DISCONTINUED | OUTPATIENT
Start: 2022-09-16 | End: 2022-09-16 | Stop reason: RX

## 2022-09-16 RX ORDER — LIDOCAINE 4 G/G
1 PATCH TOPICAL DAILY
Status: DISCONTINUED | OUTPATIENT
Start: 2022-09-16 | End: 2022-09-18 | Stop reason: HOSPADM

## 2022-09-16 RX ORDER — DIAZEPAM 5 MG/1
10 TABLET ORAL
Status: DISCONTINUED | OUTPATIENT
Start: 2022-09-16 | End: 2022-09-18 | Stop reason: HOSPADM

## 2022-09-16 RX ORDER — AMLODIPINE BESYLATE 5 MG/1
10 TABLET ORAL DAILY
Status: DISCONTINUED | OUTPATIENT
Start: 2022-09-16 | End: 2022-09-18 | Stop reason: HOSPADM

## 2022-09-16 RX ORDER — OXYCODONE HYDROCHLORIDE 5 MG/1
5 TABLET ORAL EVERY 4 HOURS PRN
Status: COMPLETED | OUTPATIENT
Start: 2022-09-16 | End: 2022-09-18

## 2022-09-16 RX ORDER — DIAZEPAM 5 MG/1
20 TABLET ORAL
Status: DISCONTINUED | OUTPATIENT
Start: 2022-09-16 | End: 2022-09-18 | Stop reason: HOSPADM

## 2022-09-16 RX ORDER — LOSARTAN POTASSIUM 100 MG/1
100 TABLET ORAL DAILY
Status: DISCONTINUED | OUTPATIENT
Start: 2022-09-16 | End: 2022-09-18 | Stop reason: HOSPADM

## 2022-09-16 RX ORDER — ACETAMINOPHEN 325 MG/1
650 TABLET ORAL EVERY 6 HOURS PRN
Status: DISCONTINUED | OUTPATIENT
Start: 2022-09-16 | End: 2022-09-18 | Stop reason: HOSPADM

## 2022-09-16 RX ORDER — LORAZEPAM 2 MG/ML
1 INJECTION INTRAMUSCULAR
Status: DISCONTINUED | OUTPATIENT
Start: 2022-09-16 | End: 2022-09-16 | Stop reason: RX

## 2022-09-16 RX ORDER — M-VIT,TX,IRON,MINS/CALC/FOLIC 27MG-0.4MG
1 TABLET ORAL DAILY
Status: DISCONTINUED | OUTPATIENT
Start: 2022-09-16 | End: 2022-09-18 | Stop reason: HOSPADM

## 2022-09-16 RX ORDER — SODIUM CHLORIDE 0.9 % (FLUSH) 0.9 %
10 SYRINGE (ML) INJECTION PRN
Status: DISCONTINUED | OUTPATIENT
Start: 2022-09-16 | End: 2022-09-18 | Stop reason: HOSPADM

## 2022-09-16 RX ORDER — DIAZEPAM 5 MG/ML
10 INJECTION, SOLUTION INTRAMUSCULAR; INTRAVENOUS
Status: DISCONTINUED | OUTPATIENT
Start: 2022-09-16 | End: 2022-09-18 | Stop reason: HOSPADM

## 2022-09-16 RX ORDER — POTASSIUM CHLORIDE 7.45 MG/ML
10 INJECTION INTRAVENOUS PRN
Status: DISCONTINUED | OUTPATIENT
Start: 2022-09-16 | End: 2022-09-18 | Stop reason: HOSPADM

## 2022-09-16 RX ORDER — LORAZEPAM 2 MG/ML
4 INJECTION INTRAMUSCULAR
Status: DISCONTINUED | OUTPATIENT
Start: 2022-09-16 | End: 2022-09-16 | Stop reason: RX

## 2022-09-16 RX ORDER — DIAZEPAM 5 MG/ML
5 INJECTION, SOLUTION INTRAMUSCULAR; INTRAVENOUS
Status: DISCONTINUED | OUTPATIENT
Start: 2022-09-16 | End: 2022-09-18 | Stop reason: HOSPADM

## 2022-09-16 RX ORDER — LORAZEPAM 1 MG/1
1 TABLET ORAL
Status: DISCONTINUED | OUTPATIENT
Start: 2022-09-16 | End: 2022-09-16 | Stop reason: RX

## 2022-09-16 RX ORDER — METOPROLOL TARTRATE 50 MG/1
50 TABLET, FILM COATED ORAL 2 TIMES DAILY
Status: DISCONTINUED | OUTPATIENT
Start: 2022-09-16 | End: 2022-09-18 | Stop reason: HOSPADM

## 2022-09-16 RX ORDER — HYDROMORPHONE HYDROCHLORIDE 1 MG/ML
1 INJECTION, SOLUTION INTRAMUSCULAR; INTRAVENOUS; SUBCUTANEOUS ONCE
Status: COMPLETED | OUTPATIENT
Start: 2022-09-16 | End: 2022-09-16

## 2022-09-16 RX ORDER — DIAZEPAM 5 MG/ML
15 INJECTION, SOLUTION INTRAMUSCULAR; INTRAVENOUS
Status: DISCONTINUED | OUTPATIENT
Start: 2022-09-16 | End: 2022-09-18 | Stop reason: HOSPADM

## 2022-09-16 RX ORDER — SODIUM CHLORIDE, SODIUM LACTATE, POTASSIUM CHLORIDE, CALCIUM CHLORIDE 600; 310; 30; 20 MG/100ML; MG/100ML; MG/100ML; MG/100ML
INJECTION, SOLUTION INTRAVENOUS CONTINUOUS
Status: DISCONTINUED | OUTPATIENT
Start: 2022-09-16 | End: 2022-09-17

## 2022-09-16 RX ORDER — OXYCODONE HYDROCHLORIDE 5 MG/1
10 TABLET ORAL EVERY 4 HOURS PRN
Status: COMPLETED | OUTPATIENT
Start: 2022-09-16 | End: 2022-09-17

## 2022-09-16 RX ORDER — TOPIRAMATE 25 MG/1
25 TABLET ORAL DAILY
Status: DISCONTINUED | OUTPATIENT
Start: 2022-09-16 | End: 2022-09-18 | Stop reason: HOSPADM

## 2022-09-16 RX ORDER — TOPIRAMATE 25 MG/1
50 TABLET ORAL NIGHTLY
Status: DISCONTINUED | OUTPATIENT
Start: 2022-09-16 | End: 2022-09-18 | Stop reason: HOSPADM

## 2022-09-16 RX ORDER — NICOTINE 21 MG/24HR
1 PATCH, TRANSDERMAL 24 HOURS TRANSDERMAL DAILY
Status: DISCONTINUED | OUTPATIENT
Start: 2022-09-16 | End: 2022-09-18 | Stop reason: HOSPADM

## 2022-09-16 RX ORDER — DIAZEPAM 5 MG/ML
20 INJECTION, SOLUTION INTRAMUSCULAR; INTRAVENOUS
Status: DISCONTINUED | OUTPATIENT
Start: 2022-09-16 | End: 2022-09-18 | Stop reason: HOSPADM

## 2022-09-16 RX ORDER — LORAZEPAM 1 MG/1
3 TABLET ORAL
Status: DISCONTINUED | OUTPATIENT
Start: 2022-09-16 | End: 2022-09-16 | Stop reason: RX

## 2022-09-16 RX ORDER — LORAZEPAM 2 MG/ML
3 INJECTION INTRAMUSCULAR
Status: DISCONTINUED | OUTPATIENT
Start: 2022-09-16 | End: 2022-09-16 | Stop reason: RX

## 2022-09-16 RX ORDER — LORAZEPAM 2 MG/ML
2 INJECTION INTRAMUSCULAR
Status: DISCONTINUED | OUTPATIENT
Start: 2022-09-16 | End: 2022-09-16 | Stop reason: RX

## 2022-09-16 RX ORDER — ENOXAPARIN SODIUM 100 MG/ML
40 INJECTION SUBCUTANEOUS DAILY
Status: DISCONTINUED | OUTPATIENT
Start: 2022-09-16 | End: 2022-09-16

## 2022-09-16 RX ORDER — MAGNESIUM SULFATE IN WATER 40 MG/ML
2000 INJECTION, SOLUTION INTRAVENOUS PRN
Status: DISCONTINUED | OUTPATIENT
Start: 2022-09-16 | End: 2022-09-18 | Stop reason: HOSPADM

## 2022-09-16 RX ORDER — ATORVASTATIN CALCIUM 20 MG/1
20 TABLET, FILM COATED ORAL DAILY
Status: DISCONTINUED | OUTPATIENT
Start: 2022-09-16 | End: 2022-09-18 | Stop reason: HOSPADM

## 2022-09-16 RX ORDER — ACETAMINOPHEN 650 MG/1
650 SUPPOSITORY RECTAL EVERY 6 HOURS PRN
Status: DISCONTINUED | OUTPATIENT
Start: 2022-09-16 | End: 2022-09-18 | Stop reason: HOSPADM

## 2022-09-16 RX ORDER — SODIUM CHLORIDE 0.9 % (FLUSH) 0.9 %
10 SYRINGE (ML) INJECTION EVERY 12 HOURS SCHEDULED
Status: DISCONTINUED | OUTPATIENT
Start: 2022-09-16 | End: 2022-09-18 | Stop reason: HOSPADM

## 2022-09-16 RX ORDER — GAUZE BANDAGE 2" X 2"
100 BANDAGE TOPICAL DAILY
Status: DISCONTINUED | OUTPATIENT
Start: 2022-09-16 | End: 2022-09-18 | Stop reason: HOSPADM

## 2022-09-16 RX ADMIN — SODIUM CHLORIDE, POTASSIUM CHLORIDE, SODIUM LACTATE AND CALCIUM CHLORIDE: 600; 310; 30; 20 INJECTION, SOLUTION INTRAVENOUS at 20:53

## 2022-09-16 RX ADMIN — SODIUM CHLORIDE, POTASSIUM CHLORIDE, SODIUM LACTATE AND CALCIUM CHLORIDE: 600; 310; 30; 20 INJECTION, SOLUTION INTRAVENOUS at 04:25

## 2022-09-16 RX ADMIN — OXYCODONE 10 MG: 5 TABLET ORAL at 16:17

## 2022-09-16 RX ADMIN — MULTIPLE VITAMINS W/ MINERALS TAB 1 TABLET: TAB at 09:28

## 2022-09-16 RX ADMIN — THIAMINE HCL TAB 100 MG 100 MG: 100 TAB at 09:28

## 2022-09-16 RX ADMIN — TOPIRAMATE 50 MG: 25 TABLET, FILM COATED ORAL at 20:49

## 2022-09-16 RX ADMIN — TIZANIDINE 2 MG: 4 TABLET ORAL at 18:33

## 2022-09-16 RX ADMIN — SODIUM CHLORIDE, PRESERVATIVE FREE 10 ML: 5 INJECTION INTRAVENOUS at 17:15

## 2022-09-16 RX ADMIN — ATORVASTATIN CALCIUM 20 MG: 20 TABLET, FILM COATED ORAL at 09:28

## 2022-09-16 RX ADMIN — ONDANSETRON 4 MG: 2 INJECTION INTRAMUSCULAR; INTRAVENOUS at 01:18

## 2022-09-16 RX ADMIN — METOPROLOL TARTRATE 50 MG: 50 TABLET ORAL at 09:28

## 2022-09-16 RX ADMIN — Medication 2 PUFF: at 08:35

## 2022-09-16 RX ADMIN — OXYCODONE 10 MG: 5 TABLET ORAL at 22:57

## 2022-09-16 RX ADMIN — THIAMINE HYDROCHLORIDE 100 MG: 100 INJECTION, SOLUTION INTRAMUSCULAR; INTRAVENOUS at 01:18

## 2022-09-16 RX ADMIN — DIAZEPAM 5 MG: 5 INJECTION, SOLUTION INTRAMUSCULAR; INTRAVENOUS at 17:14

## 2022-09-16 RX ADMIN — HYDROMORPHONE HYDROCHLORIDE 1 MG: 1 INJECTION, SOLUTION INTRAMUSCULAR; INTRAVENOUS; SUBCUTANEOUS at 20:49

## 2022-09-16 RX ADMIN — METOPROLOL TARTRATE 50 MG: 50 TABLET ORAL at 20:49

## 2022-09-16 RX ADMIN — DIAZEPAM 5 MG: 5 INJECTION, SOLUTION INTRAMUSCULAR; INTRAVENOUS at 09:22

## 2022-09-16 RX ADMIN — Medication 2 PUFF: at 20:04

## 2022-09-16 RX ADMIN — TIZANIDINE 2 MG: 4 TABLET ORAL at 09:29

## 2022-09-16 RX ADMIN — AMLODIPINE BESYLATE 10 MG: 5 TABLET ORAL at 09:28

## 2022-09-16 RX ADMIN — LOSARTAN POTASSIUM 100 MG: 100 TABLET, FILM COATED ORAL at 09:28

## 2022-09-16 RX ADMIN — MORPHINE SULFATE 4 MG: 4 INJECTION, SOLUTION INTRAMUSCULAR; INTRAVENOUS at 03:30

## 2022-09-16 RX ADMIN — ONDANSETRON 4 MG: 2 INJECTION INTRAMUSCULAR; INTRAVENOUS at 06:53

## 2022-09-16 RX ADMIN — MORPHINE SULFATE 4 MG: 4 INJECTION, SOLUTION INTRAMUSCULAR; INTRAVENOUS at 01:13

## 2022-09-16 RX ADMIN — TOPIRAMATE 25 MG: 25 TABLET, FILM COATED ORAL at 09:28

## 2022-09-16 RX ADMIN — HYDROMORPHONE HYDROCHLORIDE 1 MG: 1 INJECTION, SOLUTION INTRAMUSCULAR; INTRAVENOUS; SUBCUTANEOUS at 04:18

## 2022-09-16 RX ADMIN — Medication 10 ML: at 09:30

## 2022-09-16 RX ADMIN — HYDROMORPHONE HYDROCHLORIDE 1 MG: 1 INJECTION, SOLUTION INTRAMUSCULAR; INTRAVENOUS; SUBCUTANEOUS at 14:51

## 2022-09-16 ASSESSMENT — PAIN DESCRIPTION - LOCATION
LOCATION: BACK
LOCATION: BACK;SHOULDER
LOCATION: BACK
LOCATION: BACK
LOCATION: BACK;SHOULDER
LOCATION: BACK

## 2022-09-16 ASSESSMENT — PAIN DESCRIPTION - FREQUENCY
FREQUENCY: CONTINUOUS

## 2022-09-16 ASSESSMENT — PAIN - FUNCTIONAL ASSESSMENT
PAIN_FUNCTIONAL_ASSESSMENT: PREVENTS OR INTERFERES SOME ACTIVE ACTIVITIES AND ADLS
PAIN_FUNCTIONAL_ASSESSMENT: PREVENTS OR INTERFERES WITH MANY ACTIVE NOT PASSIVE ACTIVITIES

## 2022-09-16 ASSESSMENT — ENCOUNTER SYMPTOMS
SHORTNESS OF BREATH: 0
BACK PAIN: 1
SORE THROAT: 0
WHEEZING: 0
NAUSEA: 0
EYE REDNESS: 0
EYE PAIN: 0
ABDOMINAL PAIN: 0
DIARRHEA: 0
COUGH: 0

## 2022-09-16 ASSESSMENT — PAIN DESCRIPTION - ONSET
ONSET: ON-GOING

## 2022-09-16 ASSESSMENT — PAIN SCALES - GENERAL
PAINLEVEL_OUTOF10: 8
PAINLEVEL_OUTOF10: 8
PAINLEVEL_OUTOF10: 6
PAINLEVEL_OUTOF10: 5
PAINLEVEL_OUTOF10: 8
PAINLEVEL_OUTOF10: 8
PAINLEVEL_OUTOF10: 9
PAINLEVEL_OUTOF10: 9
PAINLEVEL_OUTOF10: 10
PAINLEVEL_OUTOF10: 9
PAINLEVEL_OUTOF10: 9
PAINLEVEL_OUTOF10: 8
PAINLEVEL_OUTOF10: 9

## 2022-09-16 ASSESSMENT — PAIN DESCRIPTION - DESCRIPTORS
DESCRIPTORS: SHARP
DESCRIPTORS: ACHING
DESCRIPTORS: SHARP
DESCRIPTORS: SHARP
DESCRIPTORS: THROBBING
DESCRIPTORS: ACHING
DESCRIPTORS: SHARP
DESCRIPTORS: SHARP
DESCRIPTORS: ACHING;SHARP
DESCRIPTORS: SHARP

## 2022-09-16 ASSESSMENT — PAIN DESCRIPTION - ORIENTATION
ORIENTATION: LOWER;MID
ORIENTATION: LOWER
ORIENTATION: MID;LOWER
ORIENTATION: LOWER
ORIENTATION: LOWER
ORIENTATION: LOWER;RIGHT

## 2022-09-16 ASSESSMENT — PAIN DESCRIPTION - PAIN TYPE
TYPE: ACUTE PAIN

## 2022-09-16 ASSESSMENT — PAIN SCALES - WONG BAKER: WONGBAKER_NUMERICALRESPONSE: 2

## 2022-09-16 NOTE — ED NOTES
Mcallister catheter placed at this time, patient tolerated well.       Zana Schwartz RN  09/15/22 [id number]
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
## 2022-09-16 NOTE — PLAN OF CARE
Problem: Pain  Goal: Verbalizes/displays adequate comfort level or baseline comfort level  Outcome: Progressing  Flowsheets (Taken 9/16/2022 0628)  Verbalizes/displays adequate comfort level or baseline comfort level:   Encourage patient to monitor pain and request assistance   Assess pain using appropriate pain scale   Administer analgesics based on type and severity of pain and evaluate response

## 2022-09-16 NOTE — PROGRESS NOTES
09/16/22 0319   RT Protocol   History Pulmonary Disease 1   Respiratory pattern 0   Breath sounds 2   Cough 0   Indications for Bronchodilator Therapy On home bronchodilators   Bronchodilator Assessment Score 3

## 2022-09-16 NOTE — CARE COORDINATION
Discharge Planning Assessment  Risk of Readmission Score: 12%    RN discharge planner met with patient to discuss reason for admission, current living situation, and potential needs at the time of discharge. Demographics/Insurance verified Yes    Current type of dwelling: First floor apartment with no steps to enter. Patient from [facility]/ confirmed with: N/A    Living arrangements: Spouse    Level of function/Support: Independent    PCP: Keke Guillen MD    Last Visit to PCP: 3 months ago    DME: Tub/shower    Active with any community resources/agencies/skilled home care: N/A    Medication compliance issues: The patient states he is complinat with his medications.     Financial issues that could impact healthcare: None    Tentative discharge plan: possible surgery, TLSO brace, PT/OT pending, discharge plan TBD      Transportation at the time of discharge: TBD    ERIC Castellanos RN    [facility]  Phone: [phone]

## 2022-09-16 NOTE — CONSULTS
Nephrology Consult Note  [phone]   [facility]. [facility]           Reason for Consult:  Hyponatremia    HISTORY OF PRESENT ILLNESS:                The patient is a 75 y.o.male with significant past medical history of Hyponatremia, alcohol use, Smoking, alcoholic liver disease, CAD, Hypertension, Seizures came in with back pain after a fall.  Says he lost his balance and fell, a cabinet also fell on his chest.  CXR with no acute changes  MRI Tspine pending at this time  Labs on admission significant for Leukocytosis, hyponatremia with a sodium of 125  Hyponatremia presumed to be due to volume depletion and has been on isotonic fluids with improvement in sodium  We are consulted for hyponatremia  Pt seen and examined  Has prior h/o hyponatremia with sodium as low as 117 in 2017  Of note, on Gabapentin, Topiramate, losartan at home  Drinks 3-4 beers/day  Also admits to smoking  C/o decreased urinary out-put  Otherwise, creatinine appears normal and less than 1      Past Medical History:        Diagnosis Date    Acute encephalopathy 12/11/2017    Alcohol dependence ([address] 6/4/9088    Alcoholic liver disease ([address] 12/11/2017    Anemia 12/11/2017    Anxiety     Arthritis of carpometacarpal (CMC) joint of left thumb 9/14/2017    Arthritis of left hip 6/22/2017    Arthritis of left knee 6/22/2017    Chest tightness     Continuous visual hallucinations 12/11/2017    Coronary artery disease involving native heart without angina pectoris 12/8/2021    DDD (degenerative disc disease), lumbosacral 12/15/2014    Delirium due to known physiological condition     Delirium tremens ([address] 12/11/2017    Episode of syncope 10/9/2013    Erectile dysfunction     Generalized osteoarthritis of multiple sites     Hematoma of groin     Hiatal hernia     PER CXR    Hiatal hernia with GERD 3/17/2020    Hypertension     Hyponatremia 12/11/2017    Intractable migraine without aura     Localization-related (focal) (partial) epilepsy and epileptic syndromes with complex partial seizures, without mention of intractable epilepsy     LAST SEIZURE 4/2015    Lung nodule < 6cm on CT 4/15/2016    Migraine without aura, with intractable migraine, so stated, without mention of status migrainosus     Migraine without status migrainosus, not intractable 10/9/2013    Movement disorder     Myalgia     Nicotine dependence, cigarettes, uncomplicated 6/76/9475    Pancreatitis     Panlobular emphysema ([address] 3/29/2016    Partial epilepsy with impairment of consciousness ([address] 09/16/2019    PT STATES LAST SEIZURE WAS ABOUT 3 YEARS AGO    Partial epilepsy with impairment of consciousness, intractable ([address] 9/9/2013    Primary osteoarthritis of left hip 9/14/2017    Prostatic hypertrophy, benign     Recurrent inguinal hernia 4/21/2015    Recurrent left inguinal hernia     Restless leg syndrome 10/6/2015    S/P lumbar fusion 7/23/2014    Sacroiliac dysfunction 7/23/2014    Sacroiliac inflammation ([address] 6/6/2013    Sciatica 11/20/2013    Seizure ([address] 5/4/2015    Seizure disorder 4/30/2013    SI (sacroiliac) pain 12/13/2012    Syncope     Tobacco abuse disorder 2/21/2012    Trauma and stressor-related disorder 7/10/2017    Undescended left testicle 10/6/2015    Unspecified cerebral artery occlusion with cerebral infarction     Weight loss, abnormal        Past Surgical History:        Procedure Laterality Date    ANKLE SURGERY      left    BACK SURGERY      X2 RODS AND SCREWS    COLONOSCOPY  05/26/2020    Multiple polyps, diverticulosis, internal hemorrhoids-Next colonoscopy in 2023.  Dr Alon Rascon Bilateral     CARTILAGE REPLACED IN WRIST    HERNIA REPAIR Left 05/14/2015    Inguinal, with mesh    HERNIA REPAIR Left 9/18/2019    OPEN LEFT INGUINAL HERNIA REPAIR WITH MESH performed by Mitul Sultana MD at [facility] Left 10/25/2016    inguinal hernia repair with mesh    OTHER SURGICAL HISTORY  11/10/2016    INCISION AND DRAINAGE OF LEFT GROIN HEMATOMA    SPINE SURGERY      lower back    UPPER GASTROINTESTINAL ENDOSCOPY  05/26/2020    7 cm sliding hernia         Current Medications:    No current facility-administered medications on file prior to encounter. Current Outpatient Medications on File Prior to Encounter   Medication Sig Dispense Refill    amLODIPine (NORVASC) 10 MG tablet TAKE ONE TABLET BY MOUTH DAILY 90 tablet 1    ASPIRIN LOW DOSE 81 MG EC tablet TAKE ONE TABLET BY MOUTH DAILY 90 tablet 1    predniSONE (DELTASONE) 20 MG tablet Take 20 mg by mouth in the morning.      gabapentin (NEURONTIN) 300 MG capsule Take 2 capsules by mouth in the morning and 2 capsules at noon and 2 capsules before bedtime. Do all this for 30 days. 90 capsule 2    rOPINIRole (REQUIP) 0.5 MG tablet 1-2 tab po qhs 90 tablet 1    topiramate (TOPAMAX) 25 MG tablet 1 tab in am and 2 tablet in pm 90 tablet 2    metoprolol tartrate (LOPRESSOR) 50 MG tablet Take 1 tablet by mouth in the morning and 1 tablet before bedtime.  60 tablet 2    tiZANidine (ZANAFLEX) 2 MG tablet Take 1 tablet by mouth every 8 hours as needed (leg spasm) 90 tablet 0    losartan (COZAAR) 100 MG tablet TAKE ONE TABLET BY MOUTH DAILY 90 tablet 1    atorvastatin (LIPITOR) 20 MG tablet TAKE ONE TABLET BY MOUTH DAILY 90 tablet 1    fluticasone-salmeterol (ADVAIR DISKUS) 100-50 MCG/DOSE diskus inhaler Inhale 1 puff into the lungs every 12 hours (Patient not taking: No sig reported) 60 each 3    albuterol sulfate HFA (PROAIR HFA) 108 (90 Base) MCG/ACT inhaler INHALE TWO PUFFS BY MOUTH EVERY 6 HOURS AS NEEDED FOR WHEEZING (Patient not taking: Reported on 7/21/2022) 1 each 1       Allergies:  Propoxyphene    Social History:    Social History     Socioeconomic History    Marital status:      Spouse name: Not on file    Number of children: 4    Years of education: Not on file    Highest education level: Not on file   Occupational History    Occupation: retired/disabled     Comment: formed overdose and death    Substance Abuse Son         heroin           Review of Systems   Constitutional:  Positive for activity change, appetite change and fatigue. Negative for chills and fever. HENT:  Negative for ear discharge, ear pain and sore throat. Eyes:  Negative for pain and redness. Respiratory:  Negative for cough, shortness of breath and wheezing. Cardiovascular:  Negative for chest pain, palpitations and leg swelling. Gastrointestinal:  Negative for abdominal pain, diarrhea and nausea. Genitourinary:  Negative for difficulty urinating, dysuria, frequency, hematuria and urgency. Musculoskeletal:  Positive for arthralgias and back pain. Negative for myalgias. Skin:  Negative for pallor and rash. Neurological:  Positive for seizures. Negative for dizziness, tremors, syncope, light-headedness and headaches. Psychiatric/Behavioral:  Negative for confusion and hallucinations. PHYSICAL EXAM:      Vitals:    BP (!) 163/91   Pulse 88   Temp 97.8 °F (36.6 °C) (Oral)   Resp 16   Ht 5' 5\" (1.651 m)   Wt 135 lb (61.2 kg)   SpO2 94%   BMI 22.47 kg/m²   I/O last 3 completed shifts:  In: -   Out: 850 [Urine:850]  No intake/output data recorded. Physical Exam:  General : AAOx3, not in pain or respiratory distress, resting in bed  HEENT : normocephalic, atraumatic, mucosa moist, no palor or icterus  CVS: S1 S2 normal, regular rhythm, no murmurs or rubs. Lungs: Clear, no wheezing or crackles. Abd: Soft, bowel sounds normal, non-tender. Ext: No edema, no cyanosis  Skin: Warm. No rashes appreciated. : bladder non-distended, no tenderness over the bladder  Neuro: Alert and oriented x 3, nonfocal.  Joints: No erythema noted over joints.       DATA:    CBC with Differential:    Lab Results   Component Value Date/Time    WBC 8.4 09/16/2022 04:52 AM    RBC 3.96 09/16/2022 04:52 AM    HGB 13.4 09/16/2022 04:52 AM    HCT 39.5 09/16/2022 04:52 AM     09/16/2022 04:52 AM    MCV 99.7 09/16/2022 04:52 AM    MCH 34.0 09/16/2022 04:52 AM    MCHC 34.1 09/16/2022 04:52 AM    RDW 12.1 09/16/2022 04:52 AM    SEGSPCT 78 12/03/2021 08:44 AM    LYMPHOPCT 9.3 09/16/2022 04:52 AM    MONOPCT 11.2 09/16/2022 04:52 AM    EOSPCT 0.8 01/23/2012 11:41 AM    BASOPCT 0.4 09/16/2022 04:52 AM    MONOSABS 0.9 09/16/2022 04:52 AM    LYMPHSABS 0.8 09/16/2022 04:52 AM    EOSABS 0.0 09/16/2022 04:52 AM    BASOSABS 0.0 09/16/2022 04:52 AM    DIFFTYPE Auto-K 05/06/2013 01:30 PM     BMP:    Lab Results   Component Value Date/Time     09/16/2022 11:40 AM    K 4.9 09/16/2022 11:40 AM    CL 94 09/16/2022 11:40 AM    CO2 21 09/16/2022 11:40 AM    BUN 5 09/16/2022 11:40 AM    LABALBU 4.7 09/15/2022 08:59 PM    CREATININE 0.5 09/16/2022 11:40 AM    CALCIUM 9.5 09/16/2022 11:40 AM    GFRAA >60 09/16/2022 11:40 AM    GFRAA >60 10/08/2012 02:10 PM    LABGLOM >60 09/16/2022 11:40 AM    GLUCOSE 98 09/16/2022 11:40 AM    GLUCOSE 82 12/03/2021 08:44 AM     Ionized Calcium:  No results found for: IONCA  Magnesium:    Lab Results   Component Value Date/Time    MG 2.10 09/15/2022 08:59 PM     Phosphorus:    Lab Results   Component Value Date/Time    PHOS 3.8 09/15/2022 08:59 PM     Uric Acid:    Lab Results   Component Value Date/Time    LABURIC 3.3 12/11/2017 03:43 PM     Troponin:    Lab Results   Component Value Date/Time    TROPONINI <0.01 09/16/2022 04:52 AM     Last 3 Troponin:    Lab Results   Component Value Date/Time    TROPONINI <0.01 09/16/2022 04:52 AM    TROPONINI <0.01 09/16/2022 02:43 AM    TROPONINI <0.01 12/11/2017 09:58 AM       ASSESSMENT/PLAN:      Hyponatremia  Suspect acute on chronic  Suspect due to multiple factors including SIADH from meds, poor solute inake, volume depletion, alcohol use and smoking  Urine studies not consistent with SIADH though  -check TSH  -can continue Isotonic fluids for now  -also keep on fluid restriction of 1.5 litres/day  -counseled on alcohol and smoking cessation  -Sodium Qdaily  Alcohol use  Hypertension not very well controlled  Continue same meds for now      Thank you for allowing me to participate in the care of this patient. I will continue to follow along. Please call with questions.     Kaila Stock MD, MD.  Office Phone : [phone]  9/16/2022

## 2022-09-16 NOTE — ED PROVIDER NOTES
[address] [facility]        Pt Name: Xiao Matson  MRN: [id number]  [place] 1955  Date of evaluation: 9/15/2022  Provider: Abelino Vidales PA-C  PCP: Michele Reed MD  Note Started: 11:55 PM EDT        I have seen and evaluated this patient with my supervising physician Bry Mcneil MD.    [address] [place] [address]       Chief Complaint   Patient presents with    Fall     Pt states a cabinet fell on him a few hours ago, pain in back getting worse. HISTORY OF PRESENT ILLNESS   (Location, Timing/Onset, Context/Setting, Quality, Duration, Modifying Factors, Severity, Associated Signs and Symptoms)  Note limiting factors. Chief Complaint: Monae Romero is a 75 y.o. male who presents to the emergency department today for evaluation for a fall which occurred around 4 PM.  The patient states that he was trying to walk out a 8 foot x 4 foot solid a cabinet. The patient states that he was dragging this in the yard, he states that this got caught on something, which caused him to fall, and he states that the entire cabinet fell on him. Patient states he did hit his head there is no loss of consciousness. No vomiting. He states that he did have help getting the cabinet off, and he states that he was able to get up, but he states that he has had increasing pain across his lower back since. Patient states that his pain is sharp, constant is currently rated as a 10/10. Patient states that this pain is worse with touch, and certain movements. No radiation of the pain. Patient is complaining of some pain across his lower abdomen as well, some mild abdominal distention, and states that he has not been able to urinate since 4 PM.  Patient otherwise denies any bowel incontinence. .  Patient denies any numbness, tingling or weakness. Patient denies any neck pain. No fever or chills.   No nausea or vomiting, patient does have a history of degenerative disc disease, and has had multiple surgeries to the back, states he does have rods in his back. Nursing Notes were all reviewed and agreed with or any disagreements were addressed in the HPI. REVIEW OF SYSTEMS    (2-9 systems for level 4, 10 or more for level 5)     Review of Systems   Constitutional:  Negative for activity change, appetite change, chills and fever. HENT:  Negative for congestion and rhinorrhea. Respiratory:  Negative for cough and shortness of breath. Cardiovascular:  Negative for chest pain. Gastrointestinal:  Negative for abdominal pain, diarrhea, nausea and vomiting. Genitourinary:  Positive for difficulty urinating. Negative for dysuria and hematuria. Musculoskeletal:  Positive for back pain. Neurological:  Negative for weakness and numbness. Positives and Pertinent negatives as per HPI. Except as noted above in the ROS, all other systems were reviewed and negative.        PAST MEDICAL HISTORY     Past Medical History:   Diagnosis Date    Acute encephalopathy 12/11/2017    Alcohol dependence ([facility] 1/9/4999    Alcoholic liver disease ([facility] 12/11/2017    Anemia 12/11/2017    Anxiety     Arthritis of carpometacarpal (CMC) joint of left thumb 9/14/2017    Arthritis of left hip 6/22/2017    Arthritis of left knee 6/22/2017    Chest tightness     Continuous visual hallucinations 12/11/2017    Coronary artery disease involving native heart without angina pectoris 12/8/2021    DDD (degenerative disc disease), lumbosacral 12/15/2014    Delirium due to known physiological condition     Delirium tremens ([facility] 12/11/2017    Episode of syncope 10/9/2013    Erectile dysfunction     Generalized osteoarthritis of multiple sites     Hematoma of groin     Hiatal hernia     PER CXR    Hiatal hernia with GERD 3/17/2020    Hypertension     Hyponatremia 12/11/2017    Intractable migraine without aura     Localization-related (focal) (partial) epilepsy and epileptic syndromes with complex partial seizures, without mention of intractable epilepsy     LAST SEIZURE 4/2015    Lung nodule < 6cm on CT 4/15/2016    Migraine without aura, with intractable migraine, so stated, without mention of status migrainosus     Migraine without status migrainosus, not intractable 10/9/2013    Movement disorder     Myalgia     Nicotine dependence, cigarettes, uncomplicated 7/29/7739    Pancreatitis     Panlobular emphysema ([address] 3/29/2016    Partial epilepsy with impairment of consciousness ([address] 09/16/2019    PT STATES LAST SEIZURE WAS ABOUT 3 YEARS AGO    Partial epilepsy with impairment of consciousness, intractable ([address] 9/9/2013    Primary osteoarthritis of left hip 9/14/2017    Prostatic hypertrophy, benign     Recurrent inguinal hernia 4/21/2015    Recurrent left inguinal hernia     Restless leg syndrome 10/6/2015    S/P lumbar fusion 7/23/2014    Sacroiliac dysfunction 7/23/2014    Sacroiliac inflammation ([address] 6/6/2013    Sciatica 11/20/2013    Seizure ([address] 5/4/2015    Seizure disorder 4/30/2013    SI (sacroiliac) pain 12/13/2012    Syncope     Tobacco abuse disorder 2/21/2012    Trauma and stressor-related disorder 7/10/2017    Undescended left testicle 10/6/2015    Unspecified cerebral artery occlusion with cerebral infarction     Weight loss, abnormal          SURGICAL HISTORY     Past Surgical History:   Procedure Laterality Date    ANKLE SURGERY      left    BACK SURGERY      X2 RODS AND SCREWS    COLONOSCOPY  05/26/2020    Multiple polyps, diverticulosis, internal hemorrhoids-Next colonoscopy in 2023.  Dr Emerson Common Bilateral     CARTILAGE REPLACED IN WRIST    HERNIA REPAIR Left 05/14/2015    Inguinal, with mesh    HERNIA REPAIR Left 9/18/2019    OPEN LEFT INGUINAL HERNIA REPAIR WITH MESH performed by Harshil Wagner MD at [facility] Left 10/25/2016    inguinal hernia repair with mesh    OTHER SURGICAL HISTORY  11/10/2016    INCISION AND DRAINAGE OF LEFT GROIN HEMATOMA    SPINE SURGERY      lower back    UPPER GASTROINTESTINAL ENDOSCOPY  05/26/2020    7 cm sliding hernia         CURRENTMEDICATIONS       Previous Medications    ALBUTEROL SULFATE HFA (PROAIR HFA) 108 (90 BASE) MCG/ACT INHALER    INHALE TWO PUFFS BY MOUTH EVERY 6 HOURS AS NEEDED FOR WHEEZING    AMLODIPINE (NORVASC) 10 MG TABLET    TAKE ONE TABLET BY MOUTH DAILY    ASPIRIN LOW DOSE 81 MG EC TABLET    TAKE ONE TABLET BY MOUTH DAILY    ATORVASTATIN (LIPITOR) 20 MG TABLET    TAKE ONE TABLET BY MOUTH DAILY    FLUTICASONE-SALMETEROL (ADVAIR DISKUS) 100-50 MCG/DOSE DISKUS INHALER    Inhale 1 puff into the lungs every 12 hours    GABAPENTIN (NEURONTIN) 300 MG CAPSULE    Take 2 capsules by mouth in the morning and 2 capsules at noon and 2 capsules before bedtime. Do all this for 30 days. LOSARTAN (COZAAR) 100 MG TABLET    TAKE ONE TABLET BY MOUTH DAILY    METOPROLOL TARTRATE (LOPRESSOR) 50 MG TABLET    Take 1 tablet by mouth in the morning and 1 tablet before bedtime. PREDNISONE (DELTASONE) 20 MG TABLET    Take 20 mg by mouth in the morning.     ROPINIROLE (REQUIP) 0.5 MG TABLET    1-2 tab po qhs    TIZANIDINE (ZANAFLEX) 2 MG TABLET    Take 1 tablet by mouth every 8 hours as needed (leg spasm)    TOPIRAMATE (TOPAMAX) 25 MG TABLET    1 tab in am and 2 tablet in pm         ALLERGIES     Propoxyphene    FAMILYHISTORY       Family History   Problem Relation Age of Onset    Heart Disease Father     High Blood Pressure Other     Heart Disease Other     Cancer Other     Stroke Other     Cancer Mother         lung--smoker    Substance Abuse Brother         heroin - led to unintentional overdose and death    Substance Abuse Son         heroin          SOCIAL HISTORY       Social History     Tobacco Use    Smoking status: Every Day     Packs/day: 1.00     Years: 0.50     Pack years: 0.50     Types: Cigarettes     Start date: 1/1/1969    Smokeless tobacco: Former    Tobacco comments:     using nicoderm   Vaping Use    Vaping Use: Never used   Substance Use Topics    Alcohol use: Yes     Alcohol/week: 21.0 standard drinks     Types: 21 Cans of beer per week    Drug use: No       SCREENINGS             PHYSICAL EXAM    (up to 7 for level 4, 8 or more for level 5)     ED Triage Vitals [09/15/22 2006]   BP Temp Temp Source Heart Rate Resp SpO2 Height Weight   (!) 157/80 98.3 °F (36.8 °C) Oral 94 16 99 % 5' 5\" (1.651 m) 135 lb (61.2 kg)       Physical Exam  Vitals and nursing note reviewed. Constitutional:       Appearance: He is well-developed. He is not diaphoretic. HENT:      Head: Normocephalic and atraumatic. Right Ear: External ear normal.      Left Ear: External ear normal.      Nose: Nose normal.   Eyes:      General:         Right eye: No discharge. Left eye: No discharge. Neck:      Trachea: No tracheal deviation. Cardiovascular:      Rate and Rhythm: Normal rate and regular rhythm. Pulses: Normal pulses. Pulmonary:      Effort: Pulmonary effort is normal. No respiratory distress. Breath sounds: Normal breath sounds. No wheezing or rales. Chest:      Comments: No chest wall tenderness, ecchymosis or obvious signs of trauma  Abdominal:      General: Bowel sounds are normal. There is distension. Palpations: Abdomen is soft. Tenderness: There is abdominal tenderness in the suprapubic area. There is no guarding or rebound. Comments: Minimal tenderness, distention noted of the suprapubic abdomen. No rebound or guarding. No peritoneal signs. No ecchymosis or obvious signs of trauma. Musculoskeletal:         General: Normal range of motion. Cervical back: Normal range of motion and neck supple. Comments: There is tenderness palpation to the midline lower lumbar spine. There is no bony step-offs or crepitus. There is no tenderness to the cervical spine   Skin:     General: Skin is warm and dry.    Neurological:      Mental Status: He is alert and oriented to person, place, and time. Comments: Motor strength of bilateral lower extremities is 5/5 throughout. Normal sensation light touch. Patellar reflexes are 2+ bilaterally. Posterior tibialis pulse and dorsalis pedis pulses are 2+ bilaterally. Normal dorsiflexion and plantar flexion. Full range of motion of hip, knee and ankle joints. Psychiatric:         Behavior: Behavior normal.       DIAGNOSTIC RESULTS   LABS:    Labs Reviewed   CBC WITH AUTO DIFFERENTIAL - Abnormal; Notable for the following components:       Result Value    WBC 12.7 (*)     RDW 12.2 (*)     Neutrophils Absolute 11.0 (*)     Lymphocytes Absolute 0.7 (*)     All other components within normal limits   COMPREHENSIVE METABOLIC PANEL - Abnormal; Notable for the following components:    Sodium 125 (*)     Chloride 92 (*)     CO2 17 (*)     Glucose 117 (*)     BUN 5 (*)     Creatinine 0.6 (*)     ALT 52 (*)     AST 55 (*)     All other components within normal limits   URINALYSIS WITH REFLEX TO CULTURE - Abnormal; Notable for the following components:    Clarity, UA CLOUDY (*)     Ketones, Urine TRACE (*)     Protein, UA TRACE (*)     All other components within normal limits   CULTURE, BLOOD 2   CULTURE, BLOOD 1   LIPASE   PROTIME-INR   APTT   MICROSCOPIC URINALYSIS   LACTIC ACID   PROCALCITONIN   OSMOLALITY   OSMOLALITY, URINE   SODIUM, URINE, RANDOM   CBC WITH AUTO DIFFERENTIAL   BASIC METABOLIC PANEL W/ REFLEX TO MG FOR LOW K   BASIC METABOLIC PANEL W/ REFLEX TO MG FOR LOW K   LACTIC ACID   PROCALCITONIN   BLOOD GAS, VENOUS   MAGNESIUM   PHOSPHORUS       When ordered only abnormal lab results are displayed. All other labs were within normal range or not returned as of this dictation. EKG: When ordered, EKG's are interpreted by the Emergency Department Physician in the absence of a cardiologist.  Please see their note for interpretation of EKG.     RADIOLOGY:   Non-plain film images such as CT, Ultrasound and MRI are read by the radiologist. Joellyn Holstein radiographic images are visualized and preliminarily interpreted by the ED Provider with the below findings:        Interpretation per the Radiologist below, if available at the time of this note:    XR SHOULDER RIGHT (MIN 2 VIEWS)   Final Result   Degenerative changes seen in the right shoulder, though no acute osseous   fracture or dislocation is identified. CT ABDOMEN PELVIS W IV CONTRAST Additional Contrast? None   Final Result   Acute to subacute compression fracture at the inferior endplate of D41 with   up to 30% height loss. 2 mm retropulsion of posterior cortex at the inferior   endplate of V63. No acute abnormality in the abdomen or pelvis. Large hiatal hernia. Hepatic steatosis. CT LUMBAR SPINE TRAUMA RECONSTRUCTION   Final Result   Acute to subacute compression fracture at the inferior endplate of E45 with   up to 30% height loss. 2 mm retropulsion of posterior cortex at the inferior   endplate of U47. No acute abnormality in the abdomen or pelvis. Large hiatal hernia. Hepatic steatosis. CT CERVICAL SPINE WO CONTRAST   Final Result   No acute intracranial abnormality. No acute abnormality in the cervical spine. CT HEAD WO CONTRAST   Final Result   No acute intracranial abnormality. No acute abnormality in the cervical spine.          XR CHEST PORTABLE    (Results Pending)     CT HEAD WO CONTRAST    Result Date: 9/15/2022  EXAMINATION: CT OF THE HEAD WITHOUT CONTRAST; CT OF THE CERVICAL SPINE WITHOUT CONTRAST 9/15/2022 9:58 pm; 9/15/2022 9:59 pm TECHNIQUE: CT of the head was performed without the administration of intravenous contrast. Automated exposure control, iterative reconstruction, and/or weight based adjustment of the mA/kV was utilized to reduce the radiation dose to as low as reasonably achievable.; CT of the cervical spine was performed without the administration of intravenous contrast. Multiplanar acute fracture or osseous destructive lesion is seen. DEGENERATIVE CHANGES: There is degenerative disc disease with disc space narrowing, mild endplate changes and small endplate osteophyte formation. SOFT TISSUES: No paraspinal mass is seen. There is moderate emphysema. No acute intracranial abnormality. No acute abnormality in the cervical spine. CT CERVICAL SPINE WO CONTRAST    Result Date: 9/15/2022  EXAMINATION: CT OF THE HEAD WITHOUT CONTRAST; CT OF THE CERVICAL SPINE WITHOUT CONTRAST 9/15/2022 9:58 pm; 9/15/2022 9:59 pm TECHNIQUE: CT of the head was performed without the administration of intravenous contrast. Automated exposure control, iterative reconstruction, and/or weight based adjustment of the mA/kV was utilized to reduce the radiation dose to as low as reasonably achievable.; CT of the cervical spine was performed without the administration of intravenous contrast. Multiplanar reformatted images are provided for review. Automated exposure control, iterative reconstruction, and/or weight based adjustment of the mA/kV was utilized to reduce the radiation dose to as low as reasonably achievable. COMPARISON: CT head June 12, 2020 HISTORY: ORDERING SYSTEM PROVIDED HISTORY: fall TECHNOLOGIST PROVIDED HISTORY: Reason for exam:->fall Has a \"code stroke\" or \"stroke alert\" been called? ->No Decision Support Exception - unselect if not a suspected or confirmed emergency medical condition->Emergency Medical Condition (MA) Reason for Exam: Fall (Pt states a cabinet fell on him a few hours ago, pain in back getting worse. ). ; ORDERING SYSTEM PROVIDED HISTORY: fall TECHNOLOGIST PROVIDED HISTORY: Reason for exam:->fall Decision Support Exception - unselect if not a suspected or confirmed emergency medical condition->Emergency Medical Condition (MA) Reason for Exam: Fall (Pt states a cabinet fell on him a few hours ago, pain in back getting worse. ).  FINDINGS: CT brain: BRAIN/VENTRICLES: There is mild parenchymal volume loss. There is periventricular white matter low attenuation, likely related to moderate chronic microvascular disease. There is no acute intracranial hemorrhage, mass effect or midline shift. No abnormal extra-axial fluid collection. There are old lacunar infarcts in the bilateral thalami and left central gina. The gray-white differentiation is maintained without evidence of an acute infarct. There is no evidence of hydrocephalus. ORBITS: The visualized portion of the orbits demonstrate no acute abnormality. SINUSES: The visualized paranasal sinuses and mastoid air cells demonstrate no acute abnormality. SOFT TISSUES/SKULL: No acute abnormality of the visualized skull or soft tissues. CT cervical spine: BONES/ALIGNMENT: There is normal alignment of the cervical spine. There is minimal chronic compression deformity of T1. The remainder of the vertebral body heights are maintained. No acute fracture or osseous destructive lesion is seen. DEGENERATIVE CHANGES: There is degenerative disc disease with disc space narrowing, mild endplate changes and small endplate osteophyte formation. SOFT TISSUES: No paraspinal mass is seen. There is moderate emphysema. No acute intracranial abnormality. No acute abnormality in the cervical spine. CT ABDOMEN PELVIS W IV CONTRAST Additional Contrast? None    Result Date: 9/15/2022  EXAMINATION: CT OF THE ABDOMEN AND PELVIS WITH CONTRAST; CT OF THE LUMBAR SPINE WITHOUT CONTRAST 9/15/2022 10:01 pm; 9/15/2022 9:59 pm TECHNIQUE: CT of the abdomen and pelvis was performed with the administration of intravenous contrast. Multiplanar reformatted images are provided for review.  Automated exposure control, iterative reconstruction, and/or weight based adjustment of the mA/kV was utilized to reduce the radiation dose to as low as reasonably achievable.; CT of the lumbar spine was performed without the administration of intravenous contrast. Multiplanar reformatted images are provided for review. Adjustment of mA and/or kV according to patient size was utilized. Automated exposure control, iterative reconstruction, and/or weight based adjustment of the mA/kV was utilized to reduce the radiation dose to as low as reasonably achievable. COMPARISON: None. HISTORY: ORDERING SYSTEM PROVIDED HISTORY: lower abd pain TECHNOLOGIST PROVIDED HISTORY: Reason for exam:->lower abd pain Additional Contrast?->None Decision Support Exception - unselect if not a suspected or confirmed emergency medical condition->Emergency Medical Condition (MA) Reason for Exam: Lower abd pain. Fall (Pt states a cabinet fell on him a few hours ago, pain in back getting worse. ). ; ORDERING SYSTEM PROVIDED HISTORY: low back pain, trauma, urinary retnetion TECHNOLOGIST PROVIDED HISTORY: Reason for exam:->low back pain, trauma, urinary retnetion Reason for Exam: Low back pain, trauma, urinary retention. Fall (Pt states a cabinet fell on him a few hours ago, pain in back getting worse. ). FINDINGS: CT abdomen pelvis: Lower Chest: There is bronchial wall thickening, likely due to small airway disease or bronchiolitis. No pleural effusion. The heart is of normal size. There is mild-to-moderate atherosclerotic calcification of the coronary arteries. No pericardial effusion. Organs: There is hepatic steatosis. The gallbladder, pancreas, spleen and the bilateral adrenal glands are otherwise within normal limits. There are cysts in the bilateral kidneys measuring up to 1.4 cm on the right, do not require follow-up. The bilateral kidneys are within normal limits, without hydronephrosis or obstructive uropathy. GI/Bowel: There is a large hiatal hernia. There is moderate fecal material in the rectum. There is no bowel obstruction or pneumoperitoneum. There is no acute appendicitis. There is moderate colonic diverticulosis without acute diverticulitis. Pelvis: The urinary bladder is decompressed by a Mcallister catheter. The prostate gland measures up to 5.3 cm. The remainder of the pelvic structures are grossly within normal limits. There is no free pelvic fluid. Peritoneum/Retroperitoneum: There is no ascites or pneumoperitoneum. The abdominal aorta is of normal size, with moderate atherosclerotic calcification. There is no mesenteric or retroperitoneal lymphadenopathy. Bones/Soft Tissues: There is no acute osseous abnormality. There is no acute soft tissue abnormality. CT lumbar spine: Limited evaluation secondary to metallic artifacts. The patient is status post posterior instrumented fusion of L4-S1 with bilateral pedicle screws and stabilizing rods. There is trace grade 1 L3 on L4 anterolisthesis, grade 1 L2 on L3 retrolisthesis. There is acute to subacute compression fracture at the inferior endplate of U47 with up to 30% height loss. There is 2 mm retropulsion of posterior cortex at the inferior endplate of A29. The remainder of the vertebral body heights are grossly maintained. There is degenerative disc disease with mild endplate changes and small endplate osteophyte formation. The intervertebral disc heights are grossly maintained. There is diffuse osteopenia. Acute to subacute compression fracture at the inferior endplate of T29 with up to 30% height loss. 2 mm retropulsion of posterior cortex at the inferior endplate of C78. No acute abnormality in the abdomen or pelvis. Large hiatal hernia. Hepatic steatosis. CT LUMBAR SPINE TRAUMA RECONSTRUCTION    Result Date: 9/15/2022  EXAMINATION: CT OF THE ABDOMEN AND PELVIS WITH CONTRAST; CT OF THE LUMBAR SPINE WITHOUT CONTRAST 9/15/2022 10:01 pm; 9/15/2022 9:59 pm TECHNIQUE: CT of the abdomen and pelvis was performed with the administration of intravenous contrast. Multiplanar reformatted images are provided for review.  Automated exposure control, iterative reconstruction, and/or weight based adjustment of the mA/kV was utilized to reduce the radiation dose to as low as reasonably achievable.; CT of the lumbar spine was performed without the administration of intravenous contrast. Multiplanar reformatted images are provided for review. Adjustment of mA and/or kV according to patient size was utilized. Automated exposure control, iterative reconstruction, and/or weight based adjustment of the mA/kV was utilized to reduce the radiation dose to as low as reasonably achievable. COMPARISON: None. HISTORY: ORDERING SYSTEM PROVIDED HISTORY: lower abd pain TECHNOLOGIST PROVIDED HISTORY: Reason for exam:->lower abd pain Additional Contrast?->None Decision Support Exception - unselect if not a suspected or confirmed emergency medical condition->Emergency Medical Condition (MA) Reason for Exam: Lower abd pain. Fall (Pt states a cabinet fell on him a few hours ago, pain in back getting worse. ). ; ORDERING SYSTEM PROVIDED HISTORY: low back pain, trauma, urinary retnetion TECHNOLOGIST PROVIDED HISTORY: Reason for exam:->low back pain, trauma, urinary retnetion Reason for Exam: Low back pain, trauma, urinary retention. Fall (Pt states a cabinet fell on him a few hours ago, pain in back getting worse. ). FINDINGS: CT abdomen pelvis: Lower Chest: There is bronchial wall thickening, likely due to small airway disease or bronchiolitis. No pleural effusion. The heart is of normal size. There is mild-to-moderate atherosclerotic calcification of the coronary arteries. No pericardial effusion. Organs: There is hepatic steatosis. The gallbladder, pancreas, spleen and the bilateral adrenal glands are otherwise within normal limits. There are cysts in the bilateral kidneys measuring up to 1.4 cm on the right, do not require follow-up. The bilateral kidneys are within normal limits, without hydronephrosis or obstructive uropathy. GI/Bowel: There is a large hiatal hernia. There is moderate fecal material in the rectum. There is no bowel obstruction or pneumoperitoneum.   There is no acute appendicitis. There is moderate colonic diverticulosis without acute diverticulitis. Pelvis: The urinary bladder is decompressed by a Mcallister catheter. The prostate gland measures up to 5.3 cm. The remainder of the pelvic structures are grossly within normal limits. There is no free pelvic fluid. Peritoneum/Retroperitoneum: There is no ascites or pneumoperitoneum. The abdominal aorta is of normal size, with moderate atherosclerotic calcification. There is no mesenteric or retroperitoneal lymphadenopathy. Bones/Soft Tissues: There is no acute osseous abnormality. There is no acute soft tissue abnormality. CT lumbar spine: Limited evaluation secondary to metallic artifacts. The patient is status post posterior instrumented fusion of L4-S1 with bilateral pedicle screws and stabilizing rods. There is trace grade 1 L3 on L4 anterolisthesis, grade 1 L2 on L3 retrolisthesis. There is acute to subacute compression fracture at the inferior endplate of V38 with up to 30% height loss. There is 2 mm retropulsion of posterior cortex at the inferior endplate of E92. The remainder of the vertebral body heights are grossly maintained. There is degenerative disc disease with mild endplate changes and small endplate osteophyte formation. The intervertebral disc heights are grossly maintained. There is diffuse osteopenia. Acute to subacute compression fracture at the inferior endplate of K38 with up to 30% height loss. 2 mm retropulsion of posterior cortex at the inferior endplate of K00. No acute abnormality in the abdomen or pelvis. Large hiatal hernia. Hepatic steatosis.            PROCEDURES   Unless otherwise noted below, none     Procedures    CRITICAL CARE TIME       CONSULTS:  IP CONSULT TO SPINE  IP CONSULT TO SOCIAL WORK      EMERGENCY DEPARTMENT COURSE and DIFFERENTIAL DIAGNOSIS/MDM:   Vitals:    Vitals:    09/15/22 2006   BP: (!) 157/80   Pulse: 94   Resp: 16   Temp: 98.3 °F (36.8 °C)   TempSrc: Oral   SpO2: 99%   Weight: 135 lb (61.2 kg)   Height: 5' 5\" (1.651 m)       Patient was given the following medications:  Medications   morphine injection 4 mg (4 mg IntraVENous Given 9/16/22 0113)   ondansetron (ZOFRAN) injection 4 mg (4 mg IntraVENous Given 9/15/22 2040)   sodium chloride flush 0.9 % injection 10 mL (has no administration in time range)   sodium chloride flush 0.9 % injection 10 mL (has no administration in time range)   0.9 % sodium chloride infusion (has no administration in time range)   potassium chloride 10 mEq/100 mL IVPB (Peripheral Line) (has no administration in time range)   magnesium sulfate 2000 mg in 50 mL IVPB premix (has no administration in time range)   acetaminophen (TYLENOL) tablet 650 mg (has no administration in time range)     Or   acetaminophen (TYLENOL) suppository 650 mg (has no administration in time range)   oxyCODONE (ROXICODONE) immediate release tablet 10 mg (has no administration in time range)   oxyCODONE (ROXICODONE) immediate release tablet 5 mg (has no administration in time range)   enoxaparin (LOVENOX) injection 40 mg (has no administration in time range)   thiamine mononitrate tablet 100 mg (has no administration in time range)   lactated ringers infusion (has no administration in time range)   therapeutic multivitamin-minerals 1 tablet (has no administration in time range)   diazePAM (VALIUM) tablet 5 mg (has no administration in time range)     Or   diazePAM (VALIUM) injection 5 mg (has no administration in time range)     Or   diazePAM (VALIUM) tablet 10 mg (has no administration in time range)     Or   diazePAM (VALIUM) injection 10 mg (has no administration in time range)     Or   diazePAM (VALIUM) tablet 15 mg (has no administration in time range)     Or   diazePAM (VALIUM) injection 15 mg (has no administration in time range)     Or   diazePAM (VALIUM) tablet 20 mg (has no administration in time range)     Or   diazePAM (VALIUM) injection 20 mg (has no administration in time range)   iopamidol (ISOVUE-370) 76 % injection 75 mL (75 mLs IntraVENous Given 9/15/22 2201)   ondansetron (ZOFRAN) injection 4 mg (4 mg IntraVENous Given 9/16/22 0118)   thiamine (B-1) injection 100 mg (100 mg IntraVENous Given 9/16/22 0118)         Is this patient to be included in the SEP-1 Core Measure due to severe sepsis or septic shock? No   Exclusion criteria - the patient is NOT to be included for SEP-1 Core Measure due to: Infection is not suspected    , This is a 50-year-old male who presents to the emergency department today for evaluation for a fall which occurred around 4 PM.  The patient states that he was trying to carry an outside, he states that he was dragging this on the ground, and he states that this fell on top of him. Patient states he did hit his head no loss of consciousness. No vomiting. On examination, he does have midline lower lumbar spine tenderness. There are no focal neurological deficits. He does have some mild tenderness noted to the suprapubic abdomen and patient tells me that he has not been able to urinate since this occurred around 4 PM.  Patient does have some minimal distention noted, and bladder scan did show over 300, patient was unable to urinate, Mcallister catheter was placed, and over 500 mL were drained. Urine shows no evidence of infection or blood. CBC shows a nonspecific leukocytosis of 12.7 likely due to a stress reaction. CMP does show a hyponatremia of 125, likely secondary to the patient's reported alcohol use. He is otherwise asymptomatic from this. Lipase is normal.  Imaging in the ED of head, cervical spine are negative. Right shoulder negative. He does have a T12 compression fracture. No concern for cauda equina at this time. As the patient continues to have no other neurological deficits.   Due to his T12 compression fracture, intractable pain, hyponatremia he will need to be admitted for further care and evaluation, hospitalist has been paged for admission. Patient is updated, agreeable plan, stable for admission    FINAL IMPRESSION      1. Fall, initial encounter    2. Hyponatremia    3. Urinary retention    4. Compression fracture of T12 vertebra, initial encounter [facility])          DISPOSITION/PLAN   DISPOSITION Admitted 09/16/2022 12:59:52 AM      PATIENT REFERRED TO:  No follow-up provider specified.     DISCHARGE MEDICATIONS:  New Prescriptions    No medications on file       DISCONTINUED MEDICATIONS:  Discontinued Medications    No medications on file              (Please note that portions of this note were completed with a voice recognition program.  Efforts were made to edit the dictations but occasionally words are mis-transcribed.)    Rafaela Sherman PA-C (electronically signed)                Rafaela Sherman PA-C  09/16/22 0120

## 2022-09-16 NOTE — ED PROVIDER NOTES
[address] PROVIDER NOTE    Patient Identification  Pt Name: Eli Henriquez  MRN: [id number]  Mary Ellengfkedar 1955  Date of evaluation: 9/15/2022  Provider: Josemanuel Correa MD  PCP: Ian Mejia MD    Chief Complaint  Fall (Pt states a cabinet fell on him a few hours ago, pain in back getting worse.)      HPI  History provided by patient   This is a 75 y.o. male who presents to the ED for fall. He woke up in his normal state of health this morning. No recent illness. A cabinet that weighed 200 pounds fell on his chest while he was moving it. Has history of chronic lower back pain. It is now worse. It is midline lower back as well as left-sided lower back. He has suprapubic abdominal pain as well as urinary retention. No history of urinary retention. Normal sensation in his lower extremities. No chest pain. Has right shoulder pain. No pain distal to the right shoulder including the elbow, forearm, wrist, hand. No numbness or tingling anywhere. Left arm is fine. Collarbones are fine. ROS  12 systems reviewed, pertinent positives/negatives per HPI otherwise noted to be negative.     I have reviewed the following nursing documentation:  Allergies: Propoxyphene    Past medical history:   Past Medical History:   Diagnosis Date    Acute encephalopathy 12/11/2017    Alcohol dependence ([address] 5/3/7095    Alcoholic liver disease ([address] 12/11/2017    Anemia 12/11/2017    Anxiety     Arthritis of carpometacarpal (CMC) joint of left thumb 9/14/2017    Arthritis of left hip 6/22/2017    Arthritis of left knee 6/22/2017    Chest tightness     Continuous visual hallucinations 12/11/2017    Coronary artery disease involving native heart without angina pectoris 12/8/2021    DDD (degenerative disc disease), lumbosacral 12/15/2014    Delirium due to known physiological condition     Delirium tremens ([address] 12/11/2017    Episode of syncope 10/9/2013    Erectile dysfunction     Generalized osteoarthritis of multiple sites     Hematoma of groin     Hiatal hernia     PER CXR    Hiatal hernia with GERD 3/17/2020    Hypertension     Hyponatremia 12/11/2017    Intractable migraine without aura     Localization-related (focal) (partial) epilepsy and epileptic syndromes with complex partial seizures, without mention of intractable epilepsy     LAST SEIZURE 4/2015    Lung nodule < 6cm on CT 4/15/2016    Migraine without aura, with intractable migraine, so stated, without mention of status migrainosus     Migraine without status migrainosus, not intractable 10/9/2013    Movement disorder     Myalgia     Nicotine dependence, cigarettes, uncomplicated 9/45/5071    Pancreatitis     Panlobular emphysema ([address] 3/29/2016    Partial epilepsy with impairment of consciousness ([address] 09/16/2019    PT STATES LAST SEIZURE WAS ABOUT 3 YEARS AGO    Partial epilepsy with impairment of consciousness, intractable ([address] 9/9/2013    Primary osteoarthritis of left hip 9/14/2017    Prostatic hypertrophy, benign     Recurrent inguinal hernia 4/21/2015    Recurrent left inguinal hernia     Restless leg syndrome 10/6/2015    S/P lumbar fusion 7/23/2014    Sacroiliac dysfunction 7/23/2014    Sacroiliac inflammation ([address] 6/6/2013    Sciatica 11/20/2013    Seizure ([address] 5/4/2015    Seizure disorder 4/30/2013    SI (sacroiliac) pain 12/13/2012    Syncope     Tobacco abuse disorder 2/21/2012    Trauma and stressor-related disorder 7/10/2017    Undescended left testicle 10/6/2015    Unspecified cerebral artery occlusion with cerebral infarction     Weight loss, abnormal      Past surgical history:   Past Surgical History:   Procedure Laterality Date    ANKLE SURGERY      left    BACK SURGERY      X2 RODS AND SCREWS    COLONOSCOPY  05/26/2020    Multiple polyps, diverticulosis, internal hemorrhoids-Next colonoscopy in 2023.  Dr Ara Cameron    HAND SURGERY Bilateral     CARTILAGE REPLACED IN WRIST    HERNIA REPAIR Left 05/14/2015    Inguinal, with SPINE TRAUMA RECONSTRUCTION   Final Result   Acute to subacute compression fracture at the inferior endplate of O51 with   up to 30% height loss. 2 mm retropulsion of posterior cortex at the inferior   endplate of F51. No acute abnormality in the abdomen or pelvis. Large hiatal hernia. Hepatic steatosis. CT CERVICAL SPINE WO CONTRAST   Final Result   No acute intracranial abnormality. No acute abnormality in the cervical spine. CT HEAD WO CONTRAST   Final Result   No acute intracranial abnormality. No acute abnormality in the cervical spine.              Labs  Results for orders placed or performed during the hospital encounter of 09/15/22   CBC with Auto Differential   Result Value Ref Range    WBC 12.7 (H) 4.0 - 11.0 K/uL    RBC 4.21 4.20 - 5.90 M/uL    Hemoglobin 14.2 13.5 - 17.5 g/dL    Hematocrit 41.7 40.5 - 52.5 %    MCV 99.2 80.0 - 100.0 fL    MCH 33.8 26.0 - 34.0 pg    MCHC 34.1 31.0 - 36.0 g/dL    RDW 12.2 (L) 12.4 - 15.4 %    Platelets 001 301 - 924 K/uL    MPV 7.8 5.0 - 10.5 fL    Neutrophils % 86.6 %    Lymphocytes % 5.6 %    Monocytes % 7.1 %    Eosinophils % 0.1 %    Basophils % 0.6 %    Neutrophils Absolute 11.0 (H) 1.7 - 7.7 K/uL    Lymphocytes Absolute 0.7 (L) 1.0 - 5.1 K/uL    Monocytes Absolute 0.9 0.0 - 1.3 K/uL    Eosinophils Absolute 0.0 0.0 - 0.6 K/uL    Basophils Absolute 0.1 0.0 - 0.2 K/uL   Comprehensive Metabolic Panel   Result Value Ref Range    Sodium 125 (L) 136 - 145 mmol/L    Potassium 4.8 3.5 - 5.1 mmol/L    Chloride 92 (L) 99 - 110 mmol/L    CO2 17 (L) 21 - 32 mmol/L    Anion Gap 16 3 - 16    Glucose 117 (H) 70 - 99 mg/dL    BUN 5 (L) 7 - 20 mg/dL    Creatinine 0.6 (L) 0.8 - 1.3 mg/dL    GFR Non-African American >60 >60    GFR African American >60 >60    Calcium 9.8 8.3 - 10.6 mg/dL    Total Protein 7.3 6.4 - 8.2 g/dL    Albumin 4.7 3.4 - 5.0 g/dL    Albumin/Globulin Ratio 1.8 1.1 - 2.2    Total Bilirubin 0.9 0.0 - 1.0 mg/dL    Alkaline Phosphatase 91 40 - 129 U/L    ALT 52 (H) 10 - 40 U/L    AST 55 (H) 15 - 37 U/L   Lipase   Result Value Ref Range    Lipase 32.0 13.0 - 60.0 U/L   Urinalysis with Reflex to Culture    Specimen: Urine   Result Value Ref Range    Color, UA Yellow Straw/Yellow    Clarity, UA CLOUDY (A) Clear    Glucose, Ur Negative Negative mg/dL    Bilirubin Urine Negative Negative    Ketones, Urine TRACE (A) Negative mg/dL    Specific Gravity, UA 1.015 1.005 - 1.030    Blood, Urine Negative Negative    pH, UA 5.5 5.0 - 8.0    Protein, UA TRACE (A) Negative mg/dL    Urobilinogen, Urine 1.0 <2.0 E.U./dL    Nitrite, Urine Negative Negative    Leukocyte Esterase, Urine Negative Negative    Microscopic Examination YES     Urine Type NotGiven     Urine Reflex to Culture Not Indicated    Protime-INR   Result Value Ref Range    Protime 12.4 11.7 - 14.5 sec    INR 0.93 0.87 - 1.14   APTT   Result Value Ref Range    aPTT 28.4 23.0 - 34.3 sec   Microscopic Urinalysis   Result Value Ref Range    Bacteria, UA None Seen None Seen /HPF    Hyaline Casts, UA 2 0 - 8 /LPF    WBC, UA 2 0 - 5 /HPF    RBC, UA 0 0 - 4 /HPF    Epithelial Cells, UA 1 0 - 5 /HPF       Screenings           MDM and ED Course  This is a 75 y.o. male who presents to the ED for fall. No preceding symptoms. Obtaining CT scan of L-spine, abdomen pelvis, head, C-spine as well as x-ray of right shoulder. CBC shows hyponatremia sodium 125 which is new compared to prior. He does have history of seizure remotely. He does drink 5 beers a day and this may be related to beer potomania, but I am not convinced. Currently he continues to have pain and nausea and is limiting his ability to walk. Believe that he would benefit from admission      -------    Found to have T12 compression fracture. There is 2mm retropulsion. Patient will likely need MRI as inpatient. Will admit.     [unfilled]    Is this patient to be included in the SEP-1 Core Measure due to severe sepsis or septic shock? No   Exclusion criteria - the patient is NOT to be included for SEP-1 Core Measure due to:  2+ SIRS criteria are not met        Final Impression  1. Fall, initial encounter    2. Hyponatremia    3. Urinary retention    4. Compression fracture of T12 vertebra, initial encounter ([facility])        Blood pressure (!) 157/80, pulse 94, temperature 98.3 °F (36.8 °C), temperature source Oral, resp. rate 16, height 5' 5\" (1.651 m), weight 135 lb (61.2 kg), SpO2 99 %. Disposition:  DISPOSITION Decision To Admit 09/16/2022 12:15:37 AM      Patient Referrals:  No follow-up provider specified. Discharge Medications:  New Prescriptions    No medications on file       Discontinued Medications:  Discontinued Medications    No medications on file       This chart was generated using the Fluencr dictation system. I created this record but it may contain dictation errors given the limitations of this technology.         Bruna Welch MD  09/16/22 Eliazar Arreguin MD  09/16/22 2181

## 2022-09-16 NOTE — ED NOTES
Report given to 4T RN at this time. Pt stable at time of transfer. Pt given IV Morphine prior to transfer for comfort. Ole Mustafa RN  09/16/22 [id number]

## 2022-09-16 NOTE — PROGRESS NOTES
[facility]    9/16/2022 7:35 AM        Name: Nadege Ruelas . Admitted: 9/15/2022  Primary Care Provider: Karen Trevizo MD (Tel: [phone])      Subjective:  . Seen this am with RN at bedside  Reports back pain from compression fractures    Pt reports that he lost his balance and fell,  reports that a cabinet fell on his back  Drinks 4 beers per day.   He denies any hx of DT's     Reviewed interval ancillary notes    Current Medications  sodium chloride flush 0.9 % injection 10 mL, 2 times per day  sodium chloride flush 0.9 % injection 10 mL, PRN  0.9 % sodium chloride infusion, PRN  potassium chloride 10 mEq/100 mL IVPB (Peripheral Line), PRN  magnesium sulfate 2000 mg in 50 mL IVPB premix, PRN  acetaminophen (TYLENOL) tablet 650 mg, Q6H PRN   Or  acetaminophen (TYLENOL) suppository 650 mg, Q6H PRN  oxyCODONE (ROXICODONE) immediate release tablet 10 mg, Q4H PRN  oxyCODONE (ROXICODONE) immediate release tablet 5 mg, Q4H PRN  thiamine mononitrate tablet 100 mg, Daily  lactated ringers infusion, Continuous  therapeutic multivitamin-minerals 1 tablet, Daily  diazePAM (VALIUM) tablet 5 mg, Q1H PRN   Or  diazePAM (VALIUM) injection 5 mg, Q1H PRN   Or  diazePAM (VALIUM) tablet 10 mg, Q1H PRN   Or  diazePAM (VALIUM) injection 10 mg, Q1H PRN   Or  diazePAM (VALIUM) tablet 15 mg, Q1H PRN   Or  diazePAM (VALIUM) injection 15 mg, Q1H PRN   Or  diazePAM (VALIUM) tablet 20 mg, Q1H PRN   Or  diazePAM (VALIUM) injection 20 mg, Q1H PRN  amLODIPine (NORVASC) tablet 10 mg, Daily  albuterol sulfate HFA (PROVENTIL;VENTOLIN;PROAIR) 108 (90 Base) MCG/ACT inhaler 1 puff, Q4H PRN  [START ON 9/17/2022] aspirin EC tablet 81 mg, Daily  atorvastatin (LIPITOR) tablet 20 mg, Daily  mometasone-formoterol (DULERA) 200-5 MCG/ACT inhaler 2 puff, BID  losartan (COZAAR) tablet 100 mg, Daily  metoprolol tartrate (LOPRESSOR) tablet 50 mg, BID  tiZANidine (ZANAFLEX) tablet 2 mg, Q8H PRN  topiramate (TOPAMAX) tablet 50 mg, Nightly  topiramate (TOPAMAX) tablet 25 mg, Daily  [START ON 9/17/2022] enoxaparin (LOVENOX) injection 40 mg, Daily  nicotine (NICODERM CQ) 21 MG/24HR 1 patch, Daily  ondansetron (ZOFRAN) injection 4 mg, Q6H PRN        Objective:  BP (!) 147/88   Pulse 80   Temp 98.2 °F (36.8 °C) (Oral)   Resp 16   Ht 5' 5\" (1.651 m)   Wt 135 lb (61.2 kg)   SpO2 93%   BMI 22.47 kg/m²     Intake/Output Summary (Last 24 hours) at 9/16/2022 0735  Last data filed at 9/16/2022 0436  Gross per 24 hour   Intake --   Output 850 ml   Net -850 ml      Wt Readings from Last 3 Encounters:   09/15/22 135 lb (61.2 kg)   07/21/22 126 lb 9.6 oz (57.4 kg)   06/16/22 126 lb 3.2 oz (57.2 kg)       General appearance:  Appears chronically ill and frail. Currently alert and oriented   Eyes: Sclera clear. Pupils equal.  ENT: Moist oral mucosa. Trachea midline, no adenopathy. Cardiovascular: Regular rhythm, normal S1, S2. No murmur. No edema in lower extremities  Respiratory: Not using accessory muscles. Good inspiratory effort. Clear to auscultation bilaterally, no wheeze or crackles. GI: Abdomen soft, no tenderness, not distended, normal bowel sounds  Musculoskeletal: No cyanosis in digits, neck supple, + back pain   Neurology: CN 2-12 grossly intact. No speech or motor deficits  Psych: Normal affect. Alert and oriented in time, place and person  Skin: Warm, dry, normal turgor    Labs and Tests:  CBC:   Recent Labs     09/15/22  2059 09/16/22  0452   WBC 12.7* 8.4   HGB 14.2 13.4*    193     BMP:    Recent Labs     09/15/22  2059 09/16/22  0452   * 128*   K 4.8 4.0   CL 92* 94*   CO2 17* 22   BUN 5* 6*   CREATININE 0.6* 0.6*   GLUCOSE 117* 109*     Hepatic:   Recent Labs     09/15/22  2059   AST 55*   ALT 52*   BILITOT 0.9   ALKPHOS 91       CT head and C spine:    No acute intracranial abnormality.        No acute abnormality in the cervical spine.           CT LS and CT abd   Acute to subacute compression fracture at the inferior endplate of L75 with   up to 30% height loss. 2 mm retropulsion of posterior cortex at the inferior   endplate of F58. No acute abnormality in the abdomen or pelvis. Large hiatal hernia. Hepatic steatosis. Problem List  Principal Problem:    Acute hyponatremia  Resolved Problems:    * No resolved hospital problems. *       Assessment & Plan:   Acute hyponatremia:  may be r/t beer potomania, however will ask nephrology to follow,  has seen Dr Karma Osman in the past.   Na is slowly trending up 125 now 128  T12 compression fracture;  neurosurgery consult pending. Etoh use:  pt denies hx of withdrawal. On CIWA       Diet: ADULT DIET;  Regular  Code:Full Code  DVT PPX      ROXANN Trujillo - CNP   9/16/2022 7:35 AM

## 2022-09-16 NOTE — H&P
Hospital Medicine History & Physical      PCP: Varun Barry MD    Date of Admission: 9/15/2022    Date of Service: Pt seen/examined on 09/16/2022    Pt seen/examined face to face on and admitted as inpatient with expected LOS greater than two midnights due to medical therapy    Chief Complaint:    Chief Complaint   Patient presents with    Fall     Pt states a cabinet fell on him a few hours ago, pain in back getting worse. History Of Present Illness:      75 y.o. male who presented to [facility] with past medical history of anxiety, hiatal hernia, hypertension, epilepsy, alcohol abuse presented to the ED with chief complaint of fall    Patient was walking and then lost balance walked out and hit a cabinet that fell on his chest.  Patient reported that with the help of his wife, he was able to remove the cabinet off of him. Patient since then has been having severe low back pain sharp 9/10 improved with medication given in the ED, exacerbated with minimal improvement. Patient also complained of right shoulder pain and neck pain. Patient reports that prior to the fall did not have any nausea vomiting chest pain palpitations loss of conscious. Patient did report that he did hit his head. Patient otherwise reports that since the fall he has not been able to urinate since 4 PM today but denies any bowel incontinence, Numbness radicular pain or tingling.       Past Medical History:          Diagnosis Date    Acute encephalopathy 12/11/2017    Alcohol dependence ([facility] [address] 2/9/8914    Alcoholic liver disease ([facility] [address] 12/11/2017    Anemia 12/11/2017    Anxiety     Arthritis of carpometacarpal [place]) joint of left thumb 9/14/2017    Arthritis of left hip 6/22/2017    Arthritis of left knee 6/22/2017    Chest tightness     Continuous visual hallucinations 12/11/2017    Coronary artery disease involving native heart without angina pectoris 12/8/2021    DDD (degenerative disc disease), lumbosacral 12/15/2014 Delirium due to known physiological condition     Delirium tremens ([address] 12/11/2017    Episode of syncope 10/9/2013    Erectile dysfunction     Generalized osteoarthritis of multiple sites     Hematoma of groin     Hiatal hernia     PER CXR    Hiatal hernia with GERD 3/17/2020    Hypertension     Hyponatremia 12/11/2017    Intractable migraine without aura     Localization-related (focal) (partial) epilepsy and epileptic syndromes with complex partial seizures, without mention of intractable epilepsy     LAST SEIZURE 4/2015    Lung nodule < 6cm on CT 4/15/2016    Migraine without aura, with intractable migraine, so stated, without mention of status migrainosus     Migraine without status migrainosus, not intractable 10/9/2013    Movement disorder     Myalgia     Nicotine dependence, cigarettes, uncomplicated 6/78/6435    Pancreatitis     Panlobular emphysema ([address] 3/29/2016    Partial epilepsy with impairment of consciousness ([address] 09/16/2019    PT STATES LAST SEIZURE WAS ABOUT 3 YEARS AGO    Partial epilepsy with impairment of consciousness, intractable ([address] 9/9/2013    Primary osteoarthritis of left hip 9/14/2017    Prostatic hypertrophy, benign     Recurrent inguinal hernia 4/21/2015    Recurrent left inguinal hernia     Restless leg syndrome 10/6/2015    S/P lumbar fusion 7/23/2014    Sacroiliac dysfunction 7/23/2014    Sacroiliac inflammation ([address] 6/6/2013    Sciatica 11/20/2013    Seizure ([address] 5/4/2015    Seizure disorder 4/30/2013    SI (sacroiliac) pain 12/13/2012    Syncope     Tobacco abuse disorder 2/21/2012    Trauma and stressor-related disorder 7/10/2017    Undescended left testicle 10/6/2015    Unspecified cerebral artery occlusion with cerebral infarction     Weight loss, abnormal        Past Surgical History:          Procedure Laterality Date    ANKLE SURGERY      left    BACK SURGERY      X2 RODS AND SCREWS    COLONOSCOPY  05/26/2020    Multiple polyps, diverticulosis, internal hemorrhoids-Next colonoscopy in 2023. Dr Haleigh Cunningham Bilateral     CARTILAGE REPLACED IN WRIST    HERNIA REPAIR Left 05/14/2015    Inguinal, with mesh    HERNIA REPAIR Left 9/18/2019    OPEN LEFT INGUINAL HERNIA REPAIR WITH MESH performed by Diane Acosta MD at [place] [id number] Left 10/25/2016    inguinal hernia repair with mesh    OTHER SURGICAL HISTORY  11/10/2016    INCISION AND DRAINAGE OF LEFT GROIN HEMATOMA    SPINE SURGERY      lower back    UPPER GASTROINTESTINAL ENDOSCOPY  05/26/2020    7 cm sliding hernia       Medications Prior to Admission:      Prior to Admission medications    Medication Sig Start Date End Date Taking? Authorizing Provider   amLODIPine (NORVASC) 10 MG tablet TAKE ONE TABLET BY MOUTH DAILY 9/12/22   Marie June MD   ASPIRIN LOW DOSE 81 MG EC tablet TAKE ONE TABLET BY MOUTH DAILY 7/25/22   Marie June MD   predniSONE (DELTASONE) 20 MG tablet Take 20 mg by mouth in the morning. Historical Provider, MD   gabapentin (NEURONTIN) 300 MG capsule Take 2 capsules by mouth in the morning and 2 capsules at noon and 2 capsules before bedtime. Do all this for 30 days. 7/21/22 8/20/22  Kody Crane MD   rOPINIRole (REQUIP) 0.5 MG tablet 1-2 tab po qhs 7/21/22   M Palomo June MD   topiramate (TOPAMAX) 25 MG tablet 1 tab in am and 2 tablet in pm 7/21/22   Marie June MD   metoprolol tartrate (LOPRESSOR) 50 MG tablet Take 1 tablet by mouth in the morning and 1 tablet before bedtime.  7/21/22   M Karlos Cabrera MD   tiZANidine (ZANAFLEX) 2 MG tablet Take 1 tablet by mouth every 8 hours as needed (leg spasm) 6/16/22   Kody Crane MD   losartan (COZAAR) 100 MG tablet TAKE ONE TABLET BY MOUTH DAILY 6/14/22   Marie June MD   atorvastatin (LIPITOR) 20 MG tablet TAKE ONE TABLET BY MOUTH DAILY 6/14/22   Marie June MD   fluticasone-salmeterol (ADVAIR DISKUS) 100-50 MCG/DOSE diskus inhaler Inhale 1 puff into the lungs every 12 hours  Patient not taking: No sig reported 1/19/22   Wen June MD   albuterol sulfate HFA (PROAIR HFA) 108 (90 Base) MCG/ACT inhaler INHALE TWO PUFFS BY MOUTH EVERY 6 HOURS AS NEEDED FOR WHEEZING  Patient not taking: Reported on 7/21/2022 12/8/21   Katheryn Kimbrough MD       Allergies:  Propoxyphene    Social History:          TOBACCO:   reports that he has been smoking cigarettes. He started smoking about 53 years ago. He has a 0.50 pack-year smoking history. He has quit using smokeless tobacco.  ETOH:   reports current alcohol use of about 21.0 standard drinks per week. E-cigarette/Vaping       Questions Responses    E-cigarette/Vaping Use Never User    Start Date     Passive Exposure     Quit Date     Counseling Given     Comments               Family History:      Family History reviewed with patient, and positive for heart disease        Problem Relation Age of Onset    Heart Disease Father     High Blood Pressure Other     Heart Disease Other     Cancer Other     Stroke Other     Cancer Mother         lung--smoker    Substance Abuse Brother         heroin - led to unintentional overdose and death    Substance Abuse Son         heroin       REVIEW OF SYSTEMS:     Constitutional:  No Fever, No Chills, No Night Sweats  ENT/Mouth:  No Nasal Congestion,  No Hoarseness, No new mouth lesion  Eyes:  No Eye Pain, No Redness, No Discharge  Cardiovascular:  No Chest Pain, No Orthopnea, No Palpitations  Respiratory:  No Cough, No Sputum, No Dyspnea  Gastrointestinal: No Vomiting, No Diarrhea, No abdominal pain  Genitourinary: No Urinary Frequency, No Hematuria, No Urinary pain  Musculoskeletal: + Worsening Arthralgias, No worsening Myalgias  Skin:  No new Skin Lesions, No new skin rash  Neuro:  No new weakness, No new numbness.   Psych:  No suicial ideation, No Violence ideation    PHYSICAL EXAM PERFORMED:    /78   Pulse 86   Temp 98.3 °F (36.8 °C) (Oral)   Resp 16   Ht 5' 5\" (1.651 m)   Wt 135 lb (61.2 kg)   SpO2 93%   BMI 22.47 kg/m²     General appearance:  mild acute distress, appears older than stated age  [de-identified]:   atraumatic, sclera anicteric, Conjunctivae clear. Neck: Supple,Trachea midline, no goiter  Respiratory:minimal accessory muscle usage, Normal respiratory effort. Clear to auscultation, bilaterally without wheezing  Cardiovascular:  Regular rate and rhythm, capillary refill 2 seconds  Abdomen: Soft, non-tender, non-distended with normal bowel sounds. Musculoskeletal:  No clubbing, cyanosis. trace edema LE bilaterally. Severe back pain. No midline cervical tenderness, right shoulder pain intact with no bruising, range of motion intact  Skin: turgor normal.  No new rashes or lesions. Neurologic: Alert and oriented x4, no new focal sensory/motor deficits. Labs:     Recent Labs     09/15/22  2059   WBC 12.7*   HGB 14.2   HCT 41.7        Recent Labs     09/15/22  2059   *   K 4.8   CL 92*   CO2 17*   BUN 5*   CREATININE 0.6*   CALCIUM 9.8   PHOS 3.8     Recent Labs     09/15/22  2059   AST 55*   ALT 52*   BILITOT 0.9   ALKPHOS 91     Recent Labs     09/15/22  2059   INR 0.93     No results for input(s): [place] in the last 72 hours. Urinalysis:      Lab Results   Component Value Date/Time    NITRU Negative 09/15/2022 09:34 PM    WBCUA 2 09/15/2022 09:34 PM    BACTERIA None Seen 09/15/2022 09:34 PM    RBCUA 0 09/15/2022 09:34 PM    BLOODU Negative 09/15/2022 09:34 PM    SPECGRAV 1.015 09/15/2022 09:34 PM    GLUCOSEU Negative 09/15/2022 09:34 PM       Radiology:     CXR: I have reviewed the CXR with the following interpretation:   Pending  EKG:  I have reviewed the EKG with the following interpretation:   Pending acute hyponatremia:  Suspected beer proteinemia  Patient  XR SHOULDER RIGHT (MIN 2 VIEWS)   Final Result   Degenerative changes seen in the right shoulder, though no acute osseous   fracture or dislocation is identified.          CT ABDOMEN PELVIS W IV CONTRAST Additional Contrast? None Final Result   Acute to subacute compression fracture at the inferior endplate of S77 with   up to 30% height loss. 2 mm retropulsion of posterior cortex at the inferior   endplate of W47. No acute abnormality in the abdomen or pelvis. Large hiatal hernia. Hepatic steatosis. CT LUMBAR SPINE TRAUMA RECONSTRUCTION   Final Result   Acute to subacute compression fracture at the inferior endplate of J69 with   up to 30% height loss. 2 mm retropulsion of posterior cortex at the inferior   endplate of K33. No acute abnormality in the abdomen or pelvis. Large hiatal hernia. Hepatic steatosis. CT CERVICAL SPINE WO CONTRAST   Final Result   No acute intracranial abnormality. No acute abnormality in the cervical spine. CT HEAD WO CONTRAST   Final Result   No acute intracranial abnormality. No acute abnormality in the cervical spine. XR CHEST PORTABLE    (Results Pending)       ASSESSMENT AND PLAN:    Active Hospital Problems    Diagnosis Date Noted    Acute hyponatremia [E87.1] 09/16/2022     Priority: Medium   Acute hyponatremia:  Suspected beer proteinemia  Does report withdrawal seizures in the past.  Goal sodium 131-133  LR IVF, recheck    Acute compression fracture thoracic:  Pain medication  Spine consulted, much appreciated    Alcohol abuse with withdrawal:  CIWA protocol initiated  Thiamine given    Anion gap metabolic acidosis: CO2 17  VBG 7.4, bicarb 22.6  Ethanol, Tylenol, salicylate pending  IVF and recheck    SIRS: HR >90, leukocytosis:  Suspected this is likely reactive secondary to acute traumatic fall  Blood cultures obtained, Pro-Reymundo 0.25 negative low risk for sepsis, normal lactic, blood cultures obtained, chest x-ray, UA negative  Continue to monitor off antibiotic    Fall: PT OT  Large hiatal hernia: Outpatient follow-up  Essential Hypertension: Continue home medication  COPD: Not in acute exacerbation.   Continued home inhalers  Epilepsy: Continue Topamax: Patient reported noncompliance with medication today, level pending, seizure precautions    Cigarette smoking: Nicotine patch ordered    Diet: NPO except meds ordered    DVT Prophylaxis: lovenox    Dispo:   Expected LOS greater than two [address] [facility], DO

## 2022-09-16 NOTE — PROGRESS NOTES
Physical/Occupational Therapy  Nilsa Briones  PT/OT orders noted and appreciated. Pt found to have acute to subacute compression fracture of T12. Orders in place for pt to have TLSO per Gabby Baltazar when OOB. Per pended note from neuro, awaiting completion of upright x-rays of thoracic spine while wearing TLSO prior to advancing activity as tolerated. At this time, PT/OT will therefore HOLD while awaiting completion of thoracic x-rays and follow-up with pt once imaging has been completed to assess mobility and discharge recommendations.   Thank you,  Latonya Guadarrama, PT, DPT, [id number]  [facility] OTR/L [id number]

## 2022-09-17 ENCOUNTER — APPOINTMENT (OUTPATIENT)
Dept: MRI IMAGING | Age: 67
DRG: 565 | End: 2022-09-17
Payer: MEDICARE

## 2022-09-17 LAB
ANION GAP SERPL CALCULATED.3IONS-SCNC: 11 MMOL/L (ref 3–16)
ANION GAP SERPL CALCULATED.3IONS-SCNC: 9 MMOL/L (ref 3–16)
BASOPHILS ABSOLUTE: 0 K/UL (ref 0–0.2)
BASOPHILS RELATIVE PERCENT: 0.5 %
BUN BLDV-MCNC: 5 MG/DL (ref 7–20)
BUN BLDV-MCNC: 8 MG/DL (ref 7–20)
CALCIUM SERPL-MCNC: 9.2 MG/DL (ref 8.3–10.6)
CALCIUM SERPL-MCNC: 9.5 MG/DL (ref 8.3–10.6)
CHLORIDE BLD-SCNC: 92 MMOL/L (ref 99–110)
CHLORIDE BLD-SCNC: 93 MMOL/L (ref 99–110)
CO2: 23 MMOL/L (ref 21–32)
CO2: 24 MMOL/L (ref 21–32)
CREAT SERPL-MCNC: 0.6 MG/DL (ref 0.8–1.3)
CREAT SERPL-MCNC: 0.7 MG/DL (ref 0.8–1.3)
EOSINOPHILS ABSOLUTE: 0 K/UL (ref 0–0.6)
EOSINOPHILS RELATIVE PERCENT: 0.3 %
GFR AFRICAN AMERICAN: >60
GFR AFRICAN AMERICAN: >60
GFR NON-AFRICAN AMERICAN: >60
GFR NON-AFRICAN AMERICAN: >60
GLUCOSE BLD-MCNC: 104 MG/DL (ref 70–99)
GLUCOSE BLD-MCNC: 119 MG/DL (ref 70–99)
HCT VFR BLD CALC: 39.7 % (ref 40.5–52.5)
HEMOGLOBIN: 13.5 G/DL (ref 13.5–17.5)
LYMPHOCYTES ABSOLUTE: 0.4 K/UL (ref 1–5.1)
LYMPHOCYTES RELATIVE PERCENT: 5 %
MCH RBC QN AUTO: 34.2 PG (ref 26–34)
MCHC RBC AUTO-ENTMCNC: 34 G/DL (ref 31–36)
MCV RBC AUTO: 100.6 FL (ref 80–100)
MONOCYTES ABSOLUTE: 1.1 K/UL (ref 0–1.3)
MONOCYTES RELATIVE PERCENT: 12.3 %
NEUTROPHILS ABSOLUTE: 7.4 K/UL (ref 1.7–7.7)
NEUTROPHILS RELATIVE PERCENT: 81.9 %
PDW BLD-RTO: 12.4 % (ref 12.4–15.4)
PLATELET # BLD: 173 K/UL (ref 135–450)
PMV BLD AUTO: 7.3 FL (ref 5–10.5)
POTASSIUM REFLEX MAGNESIUM: 3.8 MMOL/L (ref 3.5–5.1)
POTASSIUM SERPL-SCNC: 4.4 MMOL/L (ref 3.5–5.1)
RBC # BLD: 3.95 M/UL (ref 4.2–5.9)
SODIUM BLD-SCNC: 124 MMOL/L (ref 136–145)
SODIUM BLD-SCNC: 128 MMOL/L (ref 136–145)
SODIUM BLD-SCNC: 128 MMOL/L (ref 136–145)
SODIUM BLD-SCNC: 130 MMOL/L (ref 136–145)
TOPIRAMATE LEVEL: <1.5 UG/ML (ref 5–20)
WBC # BLD: 9 K/UL (ref 4–11)

## 2022-09-17 PROCEDURE — 72146 MRI CHEST SPINE W/O DYE: CPT

## 2022-09-17 PROCEDURE — 80048 BASIC METABOLIC PNL TOTAL CA: CPT

## 2022-09-17 PROCEDURE — 6360000002 HC RX W HCPCS: Performed by: INTERNAL MEDICINE

## 2022-09-17 PROCEDURE — 97116 GAIT TRAINING THERAPY: CPT

## 2022-09-17 PROCEDURE — 94640 AIRWAY INHALATION TREATMENT: CPT

## 2022-09-17 PROCEDURE — 36415 COLL VENOUS BLD VENIPUNCTURE: CPT

## 2022-09-17 PROCEDURE — 6370000000 HC RX 637 (ALT 250 FOR IP)

## 2022-09-17 PROCEDURE — 2580000003 HC RX 258: Performed by: INTERNAL MEDICINE

## 2022-09-17 PROCEDURE — 6370000000 HC RX 637 (ALT 250 FOR IP): Performed by: NURSE PRACTITIONER

## 2022-09-17 PROCEDURE — 84295 ASSAY OF SERUM SODIUM: CPT

## 2022-09-17 PROCEDURE — 85025 COMPLETE CBC W/AUTO DIFF WBC: CPT

## 2022-09-17 PROCEDURE — 97530 THERAPEUTIC ACTIVITIES: CPT

## 2022-09-17 PROCEDURE — 94761 N-INVAS EAR/PLS OXIMETRY MLT: CPT

## 2022-09-17 PROCEDURE — 6370000000 HC RX 637 (ALT 250 FOR IP): Performed by: INTERNAL MEDICINE

## 2022-09-17 PROCEDURE — 1200000000 HC SEMI PRIVATE

## 2022-09-17 PROCEDURE — 97161 PT EVAL LOW COMPLEX 20 MIN: CPT

## 2022-09-17 PROCEDURE — 97165 OT EVAL LOW COMPLEX 30 MIN: CPT

## 2022-09-17 RX ORDER — ONDANSETRON 4 MG/1
TABLET, ORALLY DISINTEGRATING ORAL
Status: COMPLETED
Start: 2022-09-17 | End: 2022-09-17

## 2022-09-17 RX ORDER — ONDANSETRON 4 MG/1
4 TABLET, ORALLY DISINTEGRATING ORAL EVERY 6 HOURS PRN
Status: DISCONTINUED | OUTPATIENT
Start: 2022-09-17 | End: 2022-09-18 | Stop reason: HOSPADM

## 2022-09-17 RX ADMIN — ATORVASTATIN CALCIUM 20 MG: 20 TABLET, FILM COATED ORAL at 09:14

## 2022-09-17 RX ADMIN — ONDANSETRON: 4 TABLET, ORALLY DISINTEGRATING ORAL at 15:20

## 2022-09-17 RX ADMIN — AMLODIPINE BESYLATE 10 MG: 5 TABLET ORAL at 09:14

## 2022-09-17 RX ADMIN — Medication 2 PUFF: at 08:27

## 2022-09-17 RX ADMIN — TOPIRAMATE 25 MG: 25 TABLET, FILM COATED ORAL at 09:14

## 2022-09-17 RX ADMIN — OXYCODONE 5 MG: 5 TABLET ORAL at 19:18

## 2022-09-17 RX ADMIN — OXYCODONE 5 MG: 5 TABLET ORAL at 11:59

## 2022-09-17 RX ADMIN — Medication 10 ML: at 09:16

## 2022-09-17 RX ADMIN — Medication 2 PUFF: at 20:10

## 2022-09-17 RX ADMIN — THIAMINE HCL TAB 100 MG 100 MG: 100 TAB at 09:14

## 2022-09-17 RX ADMIN — OXYCODONE 10 MG: 5 TABLET ORAL at 06:46

## 2022-09-17 RX ADMIN — MULTIPLE VITAMINS W/ MINERALS TAB 1 TABLET: TAB at 09:14

## 2022-09-17 RX ADMIN — METOPROLOL TARTRATE 50 MG: 50 TABLET ORAL at 09:14

## 2022-09-17 RX ADMIN — LOSARTAN POTASSIUM 100 MG: 100 TABLET, FILM COATED ORAL at 09:14

## 2022-09-17 RX ADMIN — TOPIRAMATE 50 MG: 25 TABLET, FILM COATED ORAL at 20:12

## 2022-09-17 RX ADMIN — TIZANIDINE 2 MG: 4 TABLET ORAL at 06:45

## 2022-09-17 RX ADMIN — ASPIRIN 81 MG: 81 TABLET, COATED ORAL at 09:14

## 2022-09-17 RX ADMIN — ENOXAPARIN SODIUM 40 MG: 100 INJECTION SUBCUTANEOUS at 09:14

## 2022-09-17 RX ADMIN — METOPROLOL TARTRATE 50 MG: 50 TABLET ORAL at 20:12

## 2022-09-17 ASSESSMENT — PAIN DESCRIPTION - PAIN TYPE
TYPE: ACUTE PAIN
TYPE: ACUTE PAIN

## 2022-09-17 ASSESSMENT — PAIN DESCRIPTION - ONSET
ONSET: ON-GOING
ONSET: ON-GOING

## 2022-09-17 ASSESSMENT — PAIN DESCRIPTION - FREQUENCY
FREQUENCY: INTERMITTENT
FREQUENCY: INTERMITTENT

## 2022-09-17 ASSESSMENT — PAIN SCALES - GENERAL
PAINLEVEL_OUTOF10: 6
PAINLEVEL_OUTOF10: 10
PAINLEVEL_OUTOF10: 7

## 2022-09-17 ASSESSMENT — PAIN DESCRIPTION - DESCRIPTORS
DESCRIPTORS: ACHING;DISCOMFORT
DESCRIPTORS: ACHING

## 2022-09-17 ASSESSMENT — PAIN DESCRIPTION - LOCATION
LOCATION: BACK
LOCATION: BACK

## 2022-09-17 ASSESSMENT — PAIN DESCRIPTION - ORIENTATION
ORIENTATION: RIGHT;LOWER
ORIENTATION: LOWER;RIGHT

## 2022-09-17 NOTE — PROGRESS NOTES
[facility] Department   Phone: [phone]    Physical Therapy    [] Initial Evaluation            [] Daily Treatment Note         [] Discharge Summary      Patient: Annamarie Carlisle   : 1955   MRN: [id number]   Date of Service:  2022  Admitting Diagnosis: Acute hyponatremia    Current Admission Summary: This is a 75 y.o. male who presents to the ED for fall. He woke up in his normal state of health this morning. No recent illness. A cabinet that weighed 200 pounds fell on his chest while he was moving it. Has history of chronic lower back pain. It is now worse. It is midline lower back as well as left-sided lower back. He has suprapubic abdominal pain as well as urinary retention. No history of urinary retention. Normal sensation in his lower extremities. No chest pain. Has right shoulder pain. No pain distal to the right shoulder including the elbow, forearm, wrist, hand. No numbness or tingling anywhere. Left arm is fine. Collarbones are fine.     Past Medical History:  has a past medical history of Acute encephalopathy, Alcohol dependence ([address] Alcoholic liver disease ([address] Anemia, Anxiety, Arthritis of carpometacarpal (CMC) joint of left thumb, Arthritis of left hip, Arthritis of left knee, Chest tightness, Continuous visual hallucinations, Coronary artery disease involving native heart without angina pectoris, DDD (degenerative disc disease), lumbosacral, Delirium due to known physiological condition, Delirium tremens ([address] Episode of syncope, Erectile dysfunction, Generalized osteoarthritis of multiple sites, Hematoma of groin, Hiatal hernia, Hiatal hernia with GERD, Hypertension, Hyponatremia, Intractable migraine without aura, Localization-related (focal) (partial) epilepsy and epileptic syndromes with complex partial seizures, without mention of intractable epilepsy, Lung nodule < 6cm on CT, Migraine without aura, with intractable migraine, so stated, without mention of status migrainosus, Migraine without status migrainosus, not intractable, Movement disorder, Myalgia, Nicotine dependence, cigarettes, uncomplicated, Pancreatitis, Panlobular emphysema (HCC), Partial epilepsy with impairment of consciousness ([address] Partial epilepsy with impairment of consciousness, intractable ([address] Primary osteoarthritis of left hip, Prostatic hypertrophy, benign, Recurrent inguinal hernia, Recurrent left inguinal hernia, Restless leg syndrome, S/P lumbar fusion, Sacroiliac dysfunction, Sacroiliac inflammation (HCC), Sciatica, Seizure ([address] Seizure disorder, SI (sacroiliac) pain, Syncope, Tobacco abuse disorder, Trauma and stressor-related disorder, Undescended left testicle, Unspecified cerebral artery occlusion with cerebral infarction, and Weight loss, abnormal.    Past Surgical History:  has a past surgical history that includes Spine surgery; Ankle surgery; Hand surgery (Bilateral); hernia repair (Left, 05/14/2015); Inguinal hernia repair (Left, 10/25/2016); other surgical history (11/10/2016); back surgery; hernia repair (Left, 9/18/2019); Upper gastrointestinal endoscopy (05/26/2020); and Colonoscopy (05/26/2020). Discharge Recommendations: Jacques Christina scored a 20/24 on the AM-PAC short mobility form. At this time, no further PT is recommended upon discharge due to patient declining further therapy at d/c. Recommend patient returns to prior setting with prior services. DME Required For Discharge: no DME required at discharge - Patient reports having a rolling walker at home.   Precautions/Restrictions: high fall risk  Weight Bearing Restrictions: weight bearing as tolerated  [] Right Upper Extremity  [] Left Upper Extremity [] Right Lower Extremity  [] Left Lower Extremity     Required Braces/Orthotics: thoracic lumbar sacral orthotic   [] Right  [] Left  Positional Restrictions:Spinal Precautions - No Bending, Lifting, Twisting    Pre-Admission Information   Lives With: spouse                  Type of Home: apartment  Home Layout: one level  Home Access: level entry   [address]: tub/shower unit  [employer]: grab bars in shower  Toilet Height: standard height  Home Equipment: rolling walker  Transfer Assistance: Independent without use of device  Ambulation Assistance:Independent without use of device  ADL Assistance: independent with all ADL's  IADL Assistance: independent with homemaking tasks wife vacuums, pt completes remainder  Active :        [x] Yes                 [] No  Hand Dominance: [] Left                 [] Right  Current Employment: retired. Occupation: quitchen  Hobbies:   Recent Falls: 1 fall in the last 6 months    Examination   Vision:   Vision Gross Assessment: Impaired and Vision Corrective Device: wears glasses at all times  Hearing:   hard of hearing, no hearing aid  Observation:   General Observation:  Patient supine in bed w/ HOB elevated, TLSO in place. Posture:   WFL  Sensation:   WFL  Proprioception:    WFL  Tone:   Normotonic  Coordination Testing:   WFL    ROM:   (B) LE AROM WFL  Strength:   (B) LE strength grossly WFL  Decision Making: low complexity  Clinical Presentation: stable      Subjective  General: Patient reports improved pain from 10/10 to present 6/10. He feels he can go home in his current physical state, does not feel he needs any therapy at home, has spouse to help. Pain: 6/10.   Location: thoracic spine  Pain Interventions: RN notified and repositioned        Functional Mobility  Bed Mobility  Supine to Sit: supervision  Sit to Supine: supervision  Scooting: supervision  Comments:  Transfers  Sit to stand transfer: stand by assistance  Stand to sit transfer: stand by assistance  Bed to chair transfer: stand by assistance  Comments:  Ambulation  Surface:level surface  Assistive Device: rolling walker  Assistance: stand by assistance  Distance: 80'   Gait Mechanics: mild antalgia, dec'd bonita  Comments:    Stair Mobility  Stair mobility not completed on this date. Comments:  Wheelchair Mobility:  No w/c mobility completed on this date. Comments:  Balance  Static Sitting Balance: good: independent with functional balance in unsupported position  Dynamic Sitting Balance: good: independent with functional balance in unsupported position  Static Standing Balance: fair (+): maintains balance at SBA/supervision without use of UE support  Dynamic Standing Balance: fair (-): maintains balance at SBA with use of UE support  Comments:    Other Therapeutic Interventions:  Patient educated on donning/doffing brace as well as log rolling in bed. Functional Outcomes  AM-PAC Inpatient Mobility Raw Score : 20              Cognition  WFL  Overall Cognitive Status: WNL  Orientation:    alert and oriented x 4  Command Following:   [facility]    Education  Barriers To Learning: none  Patient Education: patient educated on goals, PT role and benefits, plan of care, precautions, general safety, functional mobility training, proper use of assistive device/equipment, transfer training, discharge recommendations  Learning Assessment:  patient verbalizes and demonstrates understanding    Assessment  Activity Tolerance: Patient reports pain, but does not limit participation except for ambulation distance. Impairments Requiring Therapeutic Intervention: decreased functional mobility, decreased ROM, decreased strength, decreased endurance, decreased balance, increased pain, decreased posture  Prognosis: good  Clinical Assessment: Patient was independent prior to admission without an assistive device. Presently, he requires SBA and use of RW for balance d/t fall, T12 compression fx, pain, limited spine mobility, and need for TLSO brace. Despite all these limitations, patient is able to perform functional mobility with SBA and is safe to d/c home w/ RW, which he reports he has, at home.   Recommend initial 24 hour assist and continued therapy at d/c. Safety Interventions: patient left in bed, bed alarm in place, call light within reach, gait belt, patient at risk for falls, and nurse notified    Plan  Frequency: 7 x/week  Current Treatment Recommendations: strengthening, ROM, balance training, functional mobility training, transfer training, gait training, endurance training, patient/caregiver education, safety education, and equipment evaluation/education    Goals  Patient Goals: Go home. Short Term Goals:  Time Frame: Discharge.   Patient will complete bed mobility at modified independent   Patient will complete transfers at [facility]   Patient will ambulate 100 ft with use of rolling walker at modified independent    Therapy Session Time      Individual Group Co-treatment   Time In     0913   Time Out     0952   Minutes     39     Timed Code Treatment Minutes:  24 Minutes  Total Treatment Minutes:  39 minutes       Electronically Signed By: Keith Zamarripa PT, DPT, ATC-R [id number]

## 2022-09-17 NOTE — CARE COORDINATION
Per Chart Review: PT/OT evaluations recommending no further PT/OT needed @ discharge.     Electronically signed by TOMMY Armando on 9/17/2022 at 11:15 AM

## 2022-09-17 NOTE — PROGRESS NOTES
[facility] [address]   Phone: [phone]    Occupational Therapy    [x] Initial Evaluation            [] Daily Treatment Note         [] Discharge Summary      Patient: Ayo Mandel   : 1955   MRN: [id number]   Date of Service:  2022    Admitting Diagnosis:  Acute hyponatremia  Current Admission Summary: 75 y.o. male who presented to [facility] with past medical history of anxiety, hiatal hernia, hypertension, epilepsy, alcohol abuse presented to the ED with chief complaint of fall    Patient was walking and then lost balance walked out and hit a cabinet that fell on his chest.  Patient reported that with the help of his wife, he was able to remove the cabinet off of him. Patient since then has been having severe low back pain sharp 9/10 improved with medication given in the ED, exacerbated with minimal improvement. Patient also complained of right shoulder pain and neck pain. Patient reports that prior to the fall did not have any nausea vomiting chest pain palpitations loss of conscious. Patient did report that he did hit his head. Patient otherwise reports that since the fall he has not been able to urinate since 4 PM today but denies any bowel incontinence, Numbness radicular pain or tingling.   Past Medical History:  has a past medical history of Acute encephalopathy, Alcohol dependence ([address] Alcoholic liver disease ([address] Anemia, Anxiety, Arthritis of carpometacarpal (CMC) joint of left thumb, Arthritis of left hip, Arthritis of left knee, Chest tightness, Continuous visual hallucinations, Coronary artery disease involving native heart without angina pectoris, DDD (degenerative disc disease), lumbosacral, Delirium due to known physiological condition, Delirium tremens ([address] Episode of syncope, Erectile dysfunction, Generalized osteoarthritis of multiple sites, Hematoma of groin, Hiatal hernia, Hiatal hernia with GERD, Hypertension, Hyponatremia, Intractable migraine without aura, Localization-related (focal) (partial) epilepsy and epileptic syndromes with complex partial seizures, without mention of intractable epilepsy, Lung nodule < 6cm on CT, Migraine without aura, with intractable migraine, so stated, without mention of status migrainosus, Migraine without status migrainosus, not intractable, Movement disorder, Myalgia, Nicotine dependence, cigarettes, uncomplicated, Pancreatitis, Panlobular emphysema (HCC), Partial epilepsy with impairment of consciousness ([address] Partial epilepsy with impairment of consciousness, intractable ([address] Primary osteoarthritis of left hip, Prostatic hypertrophy, benign, Recurrent inguinal hernia, Recurrent left inguinal hernia, Restless leg syndrome, S/P lumbar fusion, Sacroiliac dysfunction, Sacroiliac inflammation (HCC), Sciatica, Seizure ([address] Seizure disorder, SI (sacroiliac) pain, Syncope, Tobacco abuse disorder, Trauma and stressor-related disorder, Undescended left testicle, Unspecified cerebral artery occlusion with cerebral infarction, and Weight loss, abnormal.  Past Surgical History:  has a past surgical history that includes Spine surgery; Ankle surgery; Hand surgery (Bilateral); hernia repair (Left, 05/14/2015); Inguinal hernia repair (Left, 10/25/2016); other surgical history (11/10/2016); back surgery; hernia repair (Left, 9/18/2019); Upper gastrointestinal endoscopy (05/26/2020); and Colonoscopy (05/26/2020). Discharge Recommendations: Venkat Garcia scored a 19/24 on the AM-PAC ADL Inpatient form. Current research shows that an AM-PAC score of 18 or greater is typically associated with a discharge to the patient's home setting. Based on the patient's AM-PAC score, and their current ADL deficits, it is recommended that the patient have 2-3 sessions per week of Occupational Therapy at d/c to increase the patient's independence.   At this time, this patient demonstrates the endurance and safety to discharge home with no further OT anticipated. Please see assessment section for further patient specific details. If patient discharges prior to next session this note will serve as a discharge summary. Please see below for the latest assessment towards goals. DME Required For Discharge: shower chair with back    Precautions/Restrictions: high fall risk, up as tolerated  Weight Bearing Restrictions: no restrictions  [] Right Upper Extremity  [] Left Upper Extremity [] Right Lower Extremity  [] Left Lower Extremity     Required Braces/Orthotics: thoracic lumbar sacral orthotic   [] Right  [] Left  Positional Restrictions:Spinal Precautions - No Bending, Lifting, Twisting    Pre-Admission Information   Lives With: spouse    Type of Home: apartment  Home Layout: one level  Home Access: level entry  Bathroom Layout: tub/shower unit  [employer]: grab bars in shower  Toilet Height: standard height  Home Equipment: rolling walker  Transfer Assistance: Independent without use of device  Ambulation Assistance:Independent without use of device  ADL Assistance: independent with all ADL's  IADL Assistance: independent with homemaking tasks wife vacuums, pt completes remainder  Active :        [x] Yes  [] No  Hand Dominance: [] Left  [] Right  Current Employment: retired. Occupation: [employer]  Hobbies:   Recent Falls: 1 fall in the last 6 months    Examination   Vision:   Vision Gross Assessment: Impaired and Vision Corrective Device: wears glasses at all times  Hearing:   hard of hearing  ROM:   (B) UE AROM WFL  Strength:   (B) UE strength grossly WFL    Decision Making: low complexity  Clinical Presentation: stable      Subjective  General: Patient long sitting upon arrival, TLSO in place, RN present and providing pain medication. Patient agreeable to evaluation  Pain: 6/10.   Location: back  Pain Interventions: RN notified, RN notified of patient request for pain medication, and repositioned (RN presented to bedside and provided medication beginning of session)        Activities of Daily Living  Basic Activities of Daily Living  Upper Extremity Dressing: minimal assistance moderate assistance  General Comments: assistance to adjust TLSO, education regarding doffing/donning and adjusting, patient verbalized understanding  Instrumental Activities of Daily Living  No IADL completed on this date. Functional Mobility  Bed Mobility  Supine to Sit: supervision  Sit to Supine: supervision  Scooting: supervision  Comments:  Transfers  Sit to stand transfer:stand by assistance  Stand to sit transfer: stand by assistance  Bed / Chair transfer: stand by assistance. Bed / Chair equipment: rolling walker  Bed / Chair comments: transfers to/from bedside chair (stand step), patient returned to bed following ambulation with RW  Comments:  Functional Mobility:  Standing Balance: stand by assistance. Standing Balance Comment: during TLSO adjustments  Functional Mobility: .  stand by assistance  Functional Mobility Activity: ~80 feet   Functional Mobility Device Use: rolling walker    Other Therapeutic Interventions    Functional Outcomes  -PAC Inpatient Daily Activity Raw Score: 19    Cognition  WFL  Orientation:    alert and oriented x 4  Command Following:   [facility]     Education  Barriers To Learning: none  Patient Education: patient educated on goals, OT role and benefits, plan of care, discharge recommendations  Learning Assessment:  patient verbalizes and demonstrates understanding    Assessment  Activity Tolerance:  Tolerated well (limited by back pain)  Impairments Requiring Therapeutic Intervention: decreased functional mobility, decreased ADL status, decreased IADL  Prognosis: good  Clinical Assessment: Patient presents with the above deficits, which are below baseline, and would benefit from continued skilled OT to address (anticipate no further OT at discharge)  Safety Interventions: patient left in bed, bed alarm in place, call light within reach, patient at risk for falls, and nurse notified    Plan  Frequency: 5-7 x/week  Current Treatment Recommendations: strengthening, functional mobility training, endurance training, ADL/self-care training, and IADL training    Goals  Patient Goals: Return home   Short Term Goals:  Time Frame: Upon discharge  Patient will complete lower body ADL at stand by assistance   Patient will complete functional transfers at modified independent   Patient will complete functional mobility at modified independent   Patient will increase functional standing balance to 5-7 minutes mod I for improved ADL completion    Therapy Session Time     Individual Group Co-treatment   Time In    0913   Time Out    0952   Minutes    39        Timed Code Treatment Minutes:   23  Total Treatment Minutes:  39       Electronically Signed By: Beth Llanes OTR/L [id number]

## 2022-09-17 NOTE — PROGRESS NOTES
[facility]    9/17/2022 7:51 AM        Name: Shantel Velasquez . Admitted: 9/15/2022  Primary Care Provider: Hadley Nguyen MD (Tel: [phone])      Subjective:  . Seen this am with brace in place  Right arm is edematous and tender from infiltrated IV     Reports back pain from compression fractures, currently at level 5/10     Pt reports that he lost his balance and fell,  reports that a cabinet fell on his back  Drinks 4 beers per day.   He denies any hx of DT's     Reviewed interval ancillary notes    Current Medications  sodium chloride flush 0.9 % injection 10 mL, 2 times per day  sodium chloride flush 0.9 % injection 10 mL, PRN  0.9 % sodium chloride infusion, PRN  potassium chloride 10 mEq/100 mL IVPB (Peripheral Line), PRN  magnesium sulfate 2000 mg in 50 mL IVPB premix, PRN  acetaminophen (TYLENOL) tablet 650 mg, Q6H PRN   Or  acetaminophen (TYLENOL) suppository 650 mg, Q6H PRN  oxyCODONE (ROXICODONE) immediate release tablet 5 mg, Q4H PRN  thiamine mononitrate tablet 100 mg, Daily  lactated ringers infusion, Continuous  therapeutic multivitamin-minerals 1 tablet, Daily  diazePAM (VALIUM) tablet 5 mg, Q1H PRN   Or  diazePAM (VALIUM) injection 5 mg, Q1H PRN   Or  diazePAM (VALIUM) tablet 10 mg, Q1H PRN   Or  diazePAM (VALIUM) injection 10 mg, Q1H PRN   Or  diazePAM (VALIUM) tablet 15 mg, Q1H PRN   Or  diazePAM (VALIUM) injection 15 mg, Q1H PRN   Or  diazePAM (VALIUM) tablet 20 mg, Q1H PRN   Or  diazePAM (VALIUM) injection 20 mg, Q1H PRN  amLODIPine (NORVASC) tablet 10 mg, Daily  albuterol sulfate HFA (PROVENTIL;VENTOLIN;PROAIR) 108 (90 Base) MCG/ACT inhaler 1 puff, Q4H PRN  aspirin EC tablet 81 mg, Daily  atorvastatin (LIPITOR) tablet 20 mg, Daily  mometasone-formoterol (DULERA) 200-5 MCG/ACT inhaler 2 puff, BID  losartan (COZAAR) tablet 100 mg, Daily  metoprolol tartrate (LOPRESSOR) tablet 50 mg, BID  tiZANidine (ZANAFLEX) tablet 2 mg, Q8H PRN  topiramate (TOPAMAX) tablet 50 mg, Nightly  topiramate (TOPAMAX) tablet 25 mg, Daily  enoxaparin (LOVENOX) injection 40 mg, Daily  nicotine (NICODERM CQ) 21 MG/24HR 1 patch, Daily  lidocaine 4 % external patch 1 patch, Daily  HYDROmorphone HCl PF (DILAUDID) injection 1 mg, Q4H PRN  ondansetron (ZOFRAN) injection 4 mg, Q6H PRN      Objective:  BP (!) 140/82   Pulse 73   Temp 97.1 °F (36.2 °C) (Oral)   Resp 16   Ht 5' 5\" (1.651 m)   Wt 135 lb (61.2 kg)   SpO2 91%   BMI 22.47 kg/m²     Intake/Output Summary (Last 24 hours) at 9/17/2022 0751  Last data filed at 9/16/2022 2257  Gross per 24 hour   Intake 240 ml   Output 1600 ml   Net -1360 ml        Wt Readings from Last 3 Encounters:   09/15/22 135 lb (61.2 kg)   07/21/22 126 lb 9.6 oz (57.4 kg)   06/16/22 126 lb 3.2 oz (57.2 kg)       General appearance:  Appears chronically ill and frail. Currently alert and oriented , he looks better today. No tremors   Eyes: Sclera clear. Pupils equal.  ENT: Moist oral mucosa. Trachea midline, no adenopathy. Cardiovascular: Regular rhythm, normal S1, S2. No murmur. No edema in lower extremities  Respiratory: Not using accessory muscles. Good inspiratory effort. Clear to auscultation bilaterally, no wheeze or crackles. GI: Abdomen soft, no tenderness, not distended, normal bowel sounds  Musculoskeletal: No cyanosis in digits, neck supple, + back pain   Neurology: CN 2-12 grossly intact. No speech or motor deficits  Psych: Normal affect.  Alert and oriented in time, place and person  Skin: Warm, dry, normal turgor    Labs and Tests:  CBC:   Recent Labs     09/15/22  2059 09/16/22  0452 09/17/22  0006   WBC 12.7* 8.4 9.0   HGB 14.2 13.4* 13.5    193 173       BMP:    Recent Labs     09/16/22  1140 09/16/22  1922 09/17/22  0006   * 128* 124*   K 4.9 3.9 3.8   CL 94* 94* 92*   CO2 21 22 23   BUN 5* 5* 5*   CREATININE 0.5* 0.5* 0.6*   GLUCOSE 98 125* 119* Hepatic:   Recent Labs     09/15/22  2059   AST 55*   ALT 52*   BILITOT 0.9   ALKPHOS 91         CT head and C spine:    No acute intracranial abnormality. No acute abnormality in the cervical spine. CT LS and CT abd   Acute to subacute compression fracture at the inferior endplate of Y83 with   up to 30% height loss. 2 mm retropulsion of posterior cortex at the inferior   endplate of B55. No acute abnormality in the abdomen or pelvis. Large hiatal hernia. Hepatic steatosis. Problem List  Principal Problem:    Acute hyponatremia  Resolved Problems:    * No resolved hospital problems. *       Assessment & Plan:   Acute hyponatremia:  may be r/t beer potomania, on fluid restriction, pt also eats very little. ... nephrology is following. T12 compression fracture;  he has been evaluated by neurosurgery,  MRI T spine recommended. brace in place. On lidoderm patch and prn oxycodone. Etoh use:  pt denies hx of withdrawal. On CIWA       Diet: ADULT DIET;  Regular; 1500 ml  Code:Full Code  DVT PPX      Laurita Monroe, ROXANN - CNP   9/17/2022 7:51 AM

## 2022-09-17 NOTE — PROGRESS NOTES
Nephrology Progress Note  [facility]  [phone]  [facility]. [facility]          SUBJECTIVE:  We are following this patient for hyponatremia. Doing ok - reports being day by day   Eating well  Reports abiding by fluid restriction     Physical Exam:    In: 240   Out: 1600 [[id number]]   Blood pressure 120/73, pulse 67, temperature 98.8 °F (37.1 °C), temperature source Oral, resp. rate 18, height 5' 5\" (1.651 m), weight 135 lb (61.2 kg), SpO2 91 %. General : AAOx3, not in pain or respiratory distress, resting in bed  HEENT : normocephalic, atraumatic, mucosa moist, no palor or icterus  CVS: S1 S2 normal, regular rhythm, no murmurs or rubs. Lungs: Clear, no wheezing or crackles. Abd: Soft, bowel sounds normal, non-tender. Ext: No edema, no cyanosis  Skin: Warm. No rashes appreciated. : bladder non-distended, no tenderness over the bladder  Neuro: Alert and oriented x 3, nonfocal.  Joints: No erythema noted over joints.     DATA:    CBC:   Lab Results   Component Value Date/Time    WBC 9.0 09/17/2022 12:06 AM    RBC 3.95 09/17/2022 12:06 AM    HGB 13.5 09/17/2022 12:06 AM    HCT 39.7 09/17/2022 12:06 AM    .6 09/17/2022 12:06 AM    MCH 34.2 09/17/2022 12:06 AM    MCHC 34.0 09/17/2022 12:06 AM    RDW 12.4 09/17/2022 12:06 AM     09/17/2022 12:06 AM    MPV 7.3 09/17/2022 12:06 AM     BMP:    Lab Results   Component Value Date/Time     09/17/2022 12:06 AM    K 3.8 09/17/2022 12:06 AM    CL 92 09/17/2022 12:06 AM    CO2 23 09/17/2022 12:06 AM    BUN 5 09/17/2022 12:06 AM    CREATININE 0.6 09/17/2022 12:06 AM    CALCIUM 9.2 09/17/2022 12:06 AM    GFRAA >60 09/17/2022 12:06 AM    GFRAA >60 10/08/2012 02:10 PM    LABGLOM >60 09/17/2022 12:06 AM    GLUCOSE 119 09/17/2022 12:06 AM    GLUCOSE 82 12/03/2021 08:44 AM     Phosphorus:    Lab Results   Component Value Date/Time    PHOS 3.8 09/15/2022 08:59 PM     Uric Acid:  No components found for: URIC      IMPRESSION/RECOMMENDATIONS:      Hyponatremia  Suspect volume mediated initially but now worse overnight   Was on LR         Stop IVF. Check sodium level.  If dropping with resume normal saline         Continue fluid restriction  Alcohol use        Discussed cessation   Hypertension not very well controlled  Seems to be better this am          Monitor off IVF this am

## 2022-09-18 VITALS
OXYGEN SATURATION: 92 % | SYSTOLIC BLOOD PRESSURE: 153 MMHG | HEIGHT: 65 IN | TEMPERATURE: 98.6 F | RESPIRATION RATE: 16 BRPM | HEART RATE: 72 BPM | WEIGHT: 130.51 LBS | DIASTOLIC BLOOD PRESSURE: 76 MMHG | BODY MASS INDEX: 21.74 KG/M2

## 2022-09-18 LAB — SODIUM BLD-SCNC: 130 MMOL/L (ref 136–145)

## 2022-09-18 PROCEDURE — 94761 N-INVAS EAR/PLS OXIMETRY MLT: CPT

## 2022-09-18 PROCEDURE — 94640 AIRWAY INHALATION TREATMENT: CPT

## 2022-09-18 PROCEDURE — 84295 ASSAY OF SERUM SODIUM: CPT

## 2022-09-18 PROCEDURE — 6370000000 HC RX 637 (ALT 250 FOR IP): Performed by: INTERNAL MEDICINE

## 2022-09-18 PROCEDURE — 6370000000 HC RX 637 (ALT 250 FOR IP): Performed by: NURSE PRACTITIONER

## 2022-09-18 PROCEDURE — 6360000002 HC RX W HCPCS: Performed by: INTERNAL MEDICINE

## 2022-09-18 RX ORDER — ALBUTEROL SULFATE 90 UG/1
AEROSOL, METERED RESPIRATORY (INHALATION)
Qty: 1 EACH | Refills: 3 | Status: SHIPPED | OUTPATIENT
Start: 2022-09-18

## 2022-09-18 RX ORDER — OXYCODONE HYDROCHLORIDE AND ACETAMINOPHEN 5; 325 MG/1; MG/1
1 TABLET ORAL EVERY 4 HOURS PRN
Qty: 18 TABLET | Refills: 0 | Status: SHIPPED | OUTPATIENT
Start: 2022-09-18 | End: 2022-09-21

## 2022-09-18 RX ADMIN — OXYCODONE 5 MG: 5 TABLET ORAL at 07:43

## 2022-09-18 RX ADMIN — Medication 2 PUFF: at 09:00

## 2022-09-18 RX ADMIN — MULTIPLE VITAMINS W/ MINERALS TAB 1 TABLET: TAB at 07:43

## 2022-09-18 RX ADMIN — ENOXAPARIN SODIUM 40 MG: 100 INJECTION SUBCUTANEOUS at 07:44

## 2022-09-18 RX ADMIN — METOPROLOL TARTRATE 50 MG: 50 TABLET ORAL at 07:43

## 2022-09-18 RX ADMIN — AMLODIPINE BESYLATE 10 MG: 5 TABLET ORAL at 07:43

## 2022-09-18 RX ADMIN — TOPIRAMATE 25 MG: 25 TABLET, FILM COATED ORAL at 07:43

## 2022-09-18 RX ADMIN — THIAMINE HCL TAB 100 MG 100 MG: 100 TAB at 07:43

## 2022-09-18 RX ADMIN — LOSARTAN POTASSIUM 100 MG: 100 TABLET, FILM COATED ORAL at 07:43

## 2022-09-18 RX ADMIN — ATORVASTATIN CALCIUM 20 MG: 20 TABLET, FILM COATED ORAL at 07:43

## 2022-09-18 RX ADMIN — ASPIRIN 81 MG: 81 TABLET, COATED ORAL at 07:43

## 2022-09-18 ASSESSMENT — PAIN DESCRIPTION - FREQUENCY: FREQUENCY: CONTINUOUS

## 2022-09-18 ASSESSMENT — PAIN DESCRIPTION - ONSET: ONSET: ON-GOING

## 2022-09-18 ASSESSMENT — PAIN DESCRIPTION - PAIN TYPE: TYPE: ACUTE PAIN

## 2022-09-18 ASSESSMENT — PAIN DESCRIPTION - LOCATION: LOCATION: BACK

## 2022-09-18 ASSESSMENT — PAIN DESCRIPTION - ORIENTATION: ORIENTATION: LOWER

## 2022-09-18 ASSESSMENT — PAIN DESCRIPTION - DESCRIPTORS: DESCRIPTORS: ACHING;DISCOMFORT

## 2022-09-18 ASSESSMENT — PAIN SCALES - GENERAL: PAINLEVEL_OUTOF10: 7

## 2022-09-18 NOTE — DISCHARGE SUMMARY
[facility]    Patient Demographics    PatientAneesh Wooten  Date of Birth. 1955  MRN. [id number]     Primary care provider. Ally Dubose MD  (Tel: [phone])    Admit date: 9/15/2022    Discharge date 9/18/2022  Note Date: 9/18/2022     Reason for Hospitalization. Chief Complaint   Patient presents with    Fall     Pt states a cabinet fell on him a few hours ago, pain in back getting worse. Significant Findings. Principal Problem:    Acute hyponatremia  Resolved Problems:    * No resolved hospital problems. *       Problems and results from this hospitalization that need follow up. Acute compression fracture:  follow up with PCP,  if pain increases or not controlled then consider kyphoplasty . Also needs DEXA scan   Follow up with Rachel Garza NP, neurosurgery in 2- 3 weeks   Hyponatremia:  Na 130 on day of d/c with normal mentation. On Fluid restriction. Pt asked to refrain from all alcohol ( drinks 4 beers per day )     Significant test results and incidental findings. Na 124 - 125 upon admission,  130 on day of d/c   CT head and C spine:    No acute intracranial abnormality. No acute abnormality in the cervical spine. CT LS and CT abd   Acute to subacute compression fracture at the inferior endplate of C15 with   up to 30% height loss. 2 mm retropulsion of posterior cortex at the inferior   endplate of Z68. No acute abnormality in the abdomen or pelvis. Large hiatal hernia. Hepatic steatosis. MRI T spine     *Acute T12 burst fracture with mild central spinal canal narrowing at this   level. *Chronic T8 superior endplate fracture with 68% loss of vertebral body height. *Trace pleural effusions, asymmetric on the right. *Hiatal hernia       Invasive procedures and treatments.    None     [facility] at discharge. Medication List        START taking these medications      fluticasone-salmeterol 100-50 MCG/DOSE diskus inhaler  Commonly known as: Advair Diskus  Inhale 1 puff into the lungs every 12 hours     oxyCODONE-acetaminophen 5-325 MG per tablet  Commonly known as: Percocet  Take 1 tablet by mouth every 4 hours as needed for Pain for up to 3 days. Intended supply: 3 days. Take lowest dose possible to manage pain            CONTINUE taking these medications      albuterol sulfate  (90 Base) MCG/ACT inhaler  Commonly known as: ProAir HFA  INHALE TWO PUFFS BY MOUTH EVERY 6 HOURS AS NEEDED FOR WHEEZING     amLODIPine 10 MG tablet  Commonly known as: NORVASC  TAKE ONE TABLET BY MOUTH DAILY     Aspirin Low Dose 81 MG EC tablet  Generic drug: aspirin  TAKE ONE TABLET BY MOUTH DAILY     atorvastatin 20 MG tablet  Commonly known as: LIPITOR  TAKE ONE TABLET BY MOUTH DAILY     gabapentin 300 MG capsule  Commonly known as: NEURONTIN  Take 2 capsules by mouth in the morning and 2 capsules at noon and 2 capsules before bedtime. Do all this for 30 days. losartan 100 MG tablet  Commonly known as: COZAAR  TAKE ONE TABLET BY MOUTH DAILY     metoprolol tartrate 50 MG tablet  Commonly known as: LOPRESSOR  Take 1 tablet by mouth in the morning and 1 tablet before bedtime.      rOPINIRole 0.5 MG tablet  Commonly known as: Requip  1-2 tab po qhs     tiZANidine 2 MG tablet  Commonly known as: ZANAFLEX  Take 1 tablet by mouth every 8 hours as needed (leg spasm)     topiramate 25 MG tablet  Commonly known as: TOPAMAX  1 tab in am and 2 tablet in pm            STOP taking these medications      predniSONE 20 MG tablet  Commonly known as: Marquis Gonzalez               Where to Get Your Medications        These medications were sent to [facility] [id number] [address]  [facility]      Phone: [phone]   albuterol sulfate  (90 Base) MCG/ACT inhaler       You can get these medications from any pharmacy    Bring a paper prescription for each of these medications  oxyCODONE-acetaminophen 5-325 MG per tablet         Discharge recommendations given to patient. Follow Up. in 1 week   Disposition. home  Activity. activity as tolerated  Diet: ADULT DIET; Regular; 1500 ml      Spent 35 minutes in discharge process.     Signed:  ROXANN Hill CNP     9/18/2022 9:32 AM

## 2022-09-18 NOTE — PLAN OF CARE
Problem: Discharge Planning  Goal: Discharge to home or other facility with appropriate resources  Outcome: Progressing     Problem: Pain  Goal: Verbalizes/displays adequate comfort level or baseline comfort level  9/18/2022 0950 by Ena Aragon RN  Outcome: Progressing  9/18/2022 0537 by Rogelio Harry RN  Outcome: Progressing     Problem: Skin/Tissue Integrity  Goal: Absence of new skin breakdown  Description: 1. Monitor for areas of redness and/or skin breakdown  2. Assess vascular access sites hourly  3. Every 4-6 hours minimum:  Change oxygen saturation probe site  4. Every 4-6 hours:  If on nasal continuous positive airway pressure, respiratory therapy assess nares and determine need for appliance change or resting period.   9/18/2022 0950 by Ena Aragon RN  Outcome: Progressing  9/18/2022 0537 by Rogelio Harry RN  Outcome: Progressing     Problem: Safety - Adult  Goal: Free from fall injury  9/18/2022 0950 by Ena Aragon RN  Outcome: Progressing  9/18/2022 0537 by Rogelio Harry RN  Outcome: Progressing     Problem: Chronic Conditions and Co-morbidities  Goal: Patient's chronic conditions and co-morbidity symptoms are monitored and maintained or improved  Outcome: Progressing

## 2022-09-18 NOTE — PROGRESS NOTES
Assessment complete and charted earlier in shift. Pt voices frustration at having bed alarm turned on but agreeable. Pt denies needs overnight but is eager for discharge. F/C in place with 525 mL UOP overnight. RUE infiltration site continues to be swollen with c/o draining serosanguineous fluid. Dressing applied and RUE elevated. Call light within reach, will continue to monitor.

## 2022-09-18 NOTE — PROGRESS NOTES
Went over discharge instructions. All questions answered. Transferred pt via wheelchair.        Tyree Funes RN

## 2022-09-18 NOTE — PLAN OF CARE
Problem: Discharge Planning  Goal: Discharge to home or other facility with appropriate resources  9/18/2022 1146 by Es Edwards RN  Outcome: Completed  9/18/2022 0950 by Es Edwards RN  Outcome: Progressing     Problem: Pain  Goal: Verbalizes/displays adequate comfort level or baseline comfort level  9/18/2022 1146 by Es Edwards RN  Outcome: Completed  9/18/2022 0950 by Es Edwards RN  Outcome: Progressing  9/18/2022 0537 by Gayle Crespo RN  Outcome: Progressing     Problem: Skin/Tissue Integrity  Goal: Absence of new skin breakdown  Description: 1. Monitor for areas of redness and/or skin breakdown  2. Assess vascular access sites hourly  3. Every 4-6 hours minimum:  Change oxygen saturation probe site  4. Every 4-6 hours:  If on nasal continuous positive airway pressure, respiratory therapy assess nares and determine need for appliance change or resting period.   9/18/2022 1146 by Es Edwards RN  Outcome: Completed  9/18/2022 0950 by Es Edwards RN  Outcome: Progressing  9/18/2022 0537 by Gayle Crespo RN  Outcome: Progressing     Problem: Safety - Adult  Goal: Free from fall injury  9/18/2022 1146 by Es Edwards RN  Outcome: Completed  9/18/2022 0950 by Es Edwards RN  Outcome: Progressing  9/18/2022 0537 by Gayle Crespo RN  Outcome: Progressing     Problem: Chronic Conditions and Co-morbidities  Goal: Patient's chronic conditions and co-morbidity symptoms are monitored and maintained or improved  9/18/2022 1146 by Es Edwards RN  Outcome: Completed  9/18/2022 0950 by Es Edwards RN  Outcome: Progressing

## 2022-09-19 ENCOUNTER — CARE COORDINATION (OUTPATIENT)
Dept: CASE MANAGEMENT | Age: 67
End: 2022-09-19

## 2022-09-19 NOTE — CARE COORDINATION
[facility] Transitions Initial Follow Up Call    Call within 2 business days of discharge: Yes    Patient: Annamarie Carlisle Patient : 1955   MRN: [id number]  Reason for Admission: Fall, compression fracture, hyponatremia  Discharge Date: 22 RARS: Readmission Risk Score: 9.4    First attempt at 24 hour discharge call, no answer, number is not accepting calls at this time. CTN will continue with outreach call attempts.         Follow Up  Future Appointments   Date Time Provider John Lilly   2022  2:40 PM Martha Mendieta MD [address]       Sahara Adorno RN

## 2022-09-20 ENCOUNTER — CARE COORDINATION (OUTPATIENT)
Dept: CASE MANAGEMENT | Age: 67
End: 2022-09-20

## 2022-09-20 LAB
BLOOD CULTURE, ROUTINE: NORMAL
CULTURE, BLOOD 2: NORMAL

## 2022-09-20 NOTE — CARE COORDINATION
[facility] Transitions Initial Follow Up Call    Call within 2 business days of discharge: Yes    Patient: Lamonte Bynum Patient : 1955   MRN: [id number]  Reason for Admission: Fall, compression fracture, hyponatremia  Discharge Date: 22 RARS: Readmission Risk Score: 9.4    Second & final attempt at 24 hour discharge call, no answer, number is not accepting calls at this time. Patient is scheduled to see Dr. Rafaela Sandoval tomorrow. CTN will resolve episode and remain available.       Follow Up  Future Appointments   Date Time Provider John Lilly   2022  2:40 PM Erick Saenz MD [address]       Jasmeet Green RN

## 2022-09-21 ENCOUNTER — OFFICE VISIT (OUTPATIENT)
Dept: INTERNAL MEDICINE CLINIC | Age: 67
End: 2022-09-21
Payer: MEDICARE

## 2022-09-21 VITALS
HEIGHT: 65 IN | SYSTOLIC BLOOD PRESSURE: 120 MMHG | OXYGEN SATURATION: 99 % | HEART RATE: 104 BPM | BODY MASS INDEX: 20.43 KG/M2 | DIASTOLIC BLOOD PRESSURE: 70 MMHG | WEIGHT: 122.6 LBS

## 2022-09-21 DIAGNOSIS — E87.1 HYPONATREMIA: ICD-10-CM

## 2022-09-21 DIAGNOSIS — Z79.899 HIGH RISK MEDICATION USE: Primary | ICD-10-CM

## 2022-09-21 DIAGNOSIS — Z09 HOSPITAL DISCHARGE FOLLOW-UP: ICD-10-CM

## 2022-09-21 DIAGNOSIS — S22.081A T12 BURST FRACTURE (HCC): ICD-10-CM

## 2022-09-21 PROCEDURE — 1111F DSCHRG MED/CURRENT MED MERGE: CPT | Performed by: INTERNAL MEDICINE

## 2022-09-21 PROCEDURE — 99495 TRANSJ CARE MGMT MOD F2F 14D: CPT | Performed by: INTERNAL MEDICINE

## 2022-09-21 RX ORDER — CALCITONIN SALMON 200 [IU]/.09ML
1 SPRAY, METERED NASAL DAILY
Qty: 3.7 ML | Refills: 1 | Status: SHIPPED | OUTPATIENT
Start: 2022-09-21

## 2022-09-21 RX ORDER — OXYCODONE HYDROCHLORIDE AND ACETAMINOPHEN 5; 325 MG/1; MG/1
1 TABLET ORAL EVERY 8 HOURS PRN
Qty: 21 TABLET | Refills: 0 | Status: SHIPPED | OUTPATIENT
Start: 2022-09-21 | End: 2022-09-28

## 2022-09-21 ASSESSMENT — PATIENT HEALTH QUESTIONNAIRE - PHQ9
8. MOVING OR SPEAKING SO SLOWLY THAT OTHER PEOPLE COULD HAVE NOTICED. OR THE OPPOSITE, BEING SO FIGETY OR RESTLESS THAT YOU HAVE BEEN MOVING AROUND A LOT MORE THAN USUAL: 0
3. TROUBLE FALLING OR STAYING ASLEEP: 3
SUM OF ALL RESPONSES TO PHQ QUESTIONS 1-9: 9
2. FEELING DOWN, DEPRESSED OR HOPELESS: 0
SUM OF ALL RESPONSES TO PHQ QUESTIONS 1-9: 9
SUM OF ALL RESPONSES TO PHQ QUESTIONS 1-9: 9
SUM OF ALL RESPONSES TO PHQ9 QUESTIONS 1 & 2: 0
5. POOR APPETITE OR OVEREATING: 3
10. IF YOU CHECKED OFF ANY PROBLEMS, HOW DIFFICULT HAVE THESE PROBLEMS MADE IT FOR YOU TO DO YOUR WORK, TAKE CARE OF THINGS AT HOME, OR GET ALONG WITH OTHER PEOPLE: 0
9. THOUGHTS THAT YOU WOULD BE BETTER OFF DEAD, OR OF HURTING YOURSELF: 0
1. LITTLE INTEREST OR PLEASURE IN DOING THINGS: 0
6. FEELING BAD ABOUT YOURSELF - OR THAT YOU ARE A FAILURE OR HAVE LET YOURSELF OR YOUR FAMILY DOWN: 0
SUM OF ALL RESPONSES TO PHQ QUESTIONS 1-9: 9
4. FEELING TIRED OR HAVING LITTLE ENERGY: 3
7. TROUBLE CONCENTRATING ON THINGS, SUCH AS READING THE NEWSPAPER OR WATCHING TELEVISION: 0

## 2022-09-21 ASSESSMENT — LIFESTYLE VARIABLES
HOW OFTEN DURING THE LAST YEAR HAVE YOU BEEN UNABLE TO REMEMBER WHAT HAPPENED THE NIGHT BEFORE BECAUSE YOU HAD BEEN DRINKING: 0
HOW OFTEN DO YOU HAVE A DRINK CONTAINING ALCOHOL: 4 OR MORE TIMES A WEEK
HAS A RELATIVE, FRIEND, DOCTOR, OR ANOTHER HEALTH PROFESSIONAL EXPRESSED CONCERN ABOUT YOUR DRINKING OR SUGGESTED YOU CUT DOWN: 0
HOW OFTEN DURING THE LAST YEAR HAVE YOU NEEDED AN ALCOHOLIC DRINK FIRST THING IN THE MORNING TO GET YOURSELF GOING AFTER A NIGHT OF HEAVY DRINKING: 0
HOW OFTEN DURING THE LAST YEAR HAVE YOU FOUND THAT YOU WERE NOT ABLE TO STOP DRINKING ONCE YOU HAD STARTED: 0
HOW OFTEN DURING THE LAST YEAR HAVE YOU FAILED TO DO WHAT WAS NORMALLY EXPECTED FROM YOU BECAUSE OF DRINKING: 0
HOW MANY STANDARD DRINKS CONTAINING ALCOHOL DO YOU HAVE ON A TYPICAL DAY: 5 OR 6
HOW OFTEN DURING THE LAST YEAR HAVE YOU HAD A FEELING OF GUILT OR REMORSE AFTER DRINKING: 0
HAVE YOU OR SOMEONE ELSE BEEN INJURED AS A RESULT OF YOUR DRINKING: 0

## 2022-09-21 NOTE — PROGRESS NOTES
Post-Discharge Transitional Care  Follow Up      Ruby Rosales   YOB: 1955    Date of Office Visit:  9/21/2022  Date of Hospital Admission: 9/15/22  Date of Hospital Discharge: 9/18/22  Risk of hospital readmission (high >=14%. Medium >=10%) :Readmission Risk Score: 9.4      Care management risk score Rising risk (score 2-5) and Complex Care (Scores >=6): No Risk Score On File     Non face to face  following discharge, date last encounter closed (first attempt may have been earlier): 09/20/2022    Call initiated 2 business days of discharge: Yes    ASSESSMENT/PLAN:   High risk medication use  PDMP website reviewed. -     Drug Panel-PM-HI Res-UR Interp-A; Future  -     oxyCODONE-acetaminophen (PERCOCET) 5-325 MG per tablet; Take 1 tablet by mouth every 8 hours as needed for Pain for up to 7 days. Take lowest dose possible to manage pain, Disp-21 tablet, R-0Normal  Hospital discharge follow-up  -     [place] DISCHARGE MEDS RECONCILED W/ CURRENT OUTPATIENT MED LIST  T12 burst fracture ([facility] [address]  Patient have continued pain. Currently  on brace  Will refer to pain management for possible kyphoplasty. -     [facility] - Jose Roberto Campos MD, Pain Management, [facility]  -     calcitonin (MIACALCIN) 200 UNIT/ACT nasal spray; 1 spray by Nasal route daily, Disp-3.7 mL, R-1Normal  -     oxyCODONE-acetaminophen (PERCOCET) 5-325 MG per tablet; Take 1 tablet by mouth every 8 hours as needed for Pain for up to 7 days. Take lowest dose possible to manage pain, Disp-21 tablet, R-0Normal  -     DEXA BONE DENSITY AXIAL SKELETON; Future  Hyponatremia    -     oxyCODONE-acetaminophen (PERCOCET) 5-325 MG per tablet; Take 1 tablet by mouth every 8 hours as needed for Pain for up to 7 days. Take lowest dose possible to manage pain, Disp-21 tablet, R-0Normal  -     Basic Metabolic Panel; Future  -     Magnesium; Future      Medical Decision Making: moderate complexity  Return in about 4 weeks (around 10/19/2022). Subjective:   HPI:  Follow up of Hospital problems/diagnosis(es): T12 burst fracture. Electrolyte imbalance hyponatremia    Inpatient course: Discharge summary reviewed- see chart. Interval history/Current status: Patient continued to complain of significant pain over the medial spine area despite taking Percocet. He is no longer drinking alcohol. He denies any bladder or bowel incontinence. Denies any fever or chills. No change of shortness of breath from baseline. Patient Active Problem List   Diagnosis    Tobacco abuse disorder    Unilateral recurrent inguinal hernia without obstruction or gangrene    Pulmonary emphysema (HCC)    Nodule of right lung    Moderate episode of recurrent major depressive disorder ([facility] [address]    Essential hypertension    Hiatal hernia with GERD    Compression fracture of thoracic vertebra with delayed healing    Recurrent major depressive disorder, in full remission ([facility] [address]    Coronary artery disease involving native heart without angina pectoris    Chronic nonintractable headache    Cervical disc disorder at C4-C5 level with radiculopathy    Left wrist pain    Arthritis of left acromioclavicular joint    Acute hyponatremia    T12 burst fracture [facility])    Hospital discharge follow-up    High risk medication use       Medications listed as ordered at the time of discharge from hospital     Medication List            Accurate as of September 21, 2022  4:56 PM. If you have any questions, ask your nurse or doctor. START taking these medications      calcitonin 200 UNIT/ACT nasal spray  Commonly known as: Miacalcin  1 spray by Nasal route daily  Started by: Yennifer Mccord MD            CHANGE how you take these medications      * oxyCODONE-acetaminophen 5-325 MG per tablet  Commonly known as: Percocet  Take 1 tablet by mouth every 4 hours as needed for Pain for up to 3 days. Intended supply: 3 days.  Take lowest dose possible to manage pain  What changed: Another medication with the same name was added. Make sure you understand how and when to take each. Changed by: Andre Colon MD     * oxyCODONE-acetaminophen 5-325 MG per tablet  Commonly known as: Percocet  Take 1 tablet by mouth every 8 hours as needed for Pain for up to 7 days. Take lowest dose possible to manage pain  What changed: You were already taking a medication with the same name, and this prescription was added. Make sure you understand how and when to take each. Changed by: Andre Colon MD           * This list has 2 medication(s) that are the same as other medications prescribed for you. Read the directions carefully, and ask your doctor or other care provider to review them with you. CONTINUE taking these medications      albuterol sulfate  (90 Base) MCG/ACT inhaler  Commonly known as: ProAir HFA  INHALE TWO PUFFS BY MOUTH EVERY 6 HOURS AS NEEDED FOR WHEEZING     amLODIPine 10 MG tablet  Commonly known as: NORVASC  TAKE ONE TABLET BY MOUTH DAILY     Aspirin Low Dose 81 MG EC tablet  Generic drug: aspirin  TAKE ONE TABLET BY MOUTH DAILY     atorvastatin 20 MG tablet  Commonly known as: LIPITOR  TAKE ONE TABLET BY MOUTH DAILY     fluticasone-salmeterol 100-50 MCG/DOSE diskus inhaler  Commonly known as: Advair Diskus  Inhale 1 puff into the lungs every 12 hours     gabapentin 300 MG capsule  Commonly known as: NEURONTIN  Take 2 capsules by mouth in the morning and 2 capsules at noon and 2 capsules before bedtime. Do all this for 30 days. losartan 100 MG tablet  Commonly known as: COZAAR  TAKE ONE TABLET BY MOUTH DAILY     metoprolol tartrate 50 MG tablet  Commonly known as: LOPRESSOR  Take 1 tablet by mouth in the morning and 1 tablet before bedtime.      rOPINIRole 0.5 MG tablet  Commonly known as: Requip  1-2 tab po qhs     tiZANidine 2 MG tablet  Commonly known as: ZANAFLEX  Take 1 tablet by mouth every 8 hours as needed (leg spasm)     topiramate 25 MG tablet  Commonly known as: TOPAMAX  1 tab in am and 2 tablet in pm               Where to Get Your Medications        These medications were sent to [address] [facility] [address]  [address] [place], [address] [facility],[address] [address]      Phone: [phone]   calcitonin 200 UNIT/ACT nasal spray  oxyCODONE-acetaminophen 5-325 MG per tablet           Medications marked \"taking\" at this time  Outpatient Medications Marked as Taking for the 9/21/22 encounter (Office Visit) with Levi Meehan MD   Medication Sig Dispense Refill    calcitonin (MIACALCIN) 200 UNIT/ACT nasal spray 1 spray by Nasal route daily 3.7 mL 1    oxyCODONE-acetaminophen (PERCOCET) 5-325 MG per tablet Take 1 tablet by mouth every 8 hours as needed for Pain for up to 7 days. Take lowest dose possible to manage pain 21 tablet 0    albuterol sulfate HFA (PROAIR HFA) 108 (90 Base) MCG/ACT inhaler INHALE TWO PUFFS BY MOUTH EVERY 6 HOURS AS NEEDED FOR WHEEZING 1 each 3    oxyCODONE-acetaminophen (PERCOCET) 5-325 MG per tablet Take 1 tablet by mouth every 4 hours as needed for Pain for up to 3 days. Intended supply: 3 days. Take lowest dose possible to manage pain 18 tablet 0    amLODIPine (NORVASC) 10 MG tablet TAKE ONE TABLET BY MOUTH DAILY 90 tablet 1    ASPIRIN LOW DOSE 81 MG EC tablet TAKE ONE TABLET BY MOUTH DAILY 90 tablet 1    rOPINIRole (REQUIP) 0.5 MG tablet 1-2 tab po qhs 90 tablet 1    topiramate (TOPAMAX) 25 MG tablet 1 tab in am and 2 tablet in pm 90 tablet 2    metoprolol tartrate (LOPRESSOR) 50 MG tablet Take 1 tablet by mouth in the morning and 1 tablet before bedtime.  60 tablet 2    tiZANidine (ZANAFLEX) 2 MG tablet Take 1 tablet by mouth every 8 hours as needed (leg spasm) 90 tablet 0    losartan (COZAAR) 100 MG tablet TAKE ONE TABLET BY MOUTH DAILY 90 tablet 1    atorvastatin (LIPITOR) 20 MG tablet TAKE ONE TABLET BY MOUTH DAILY 90 tablet 1    fluticasone-salmeterol (ADVAIR DISKUS) 100-50 MCG/DOSE diskus inhaler Inhale 1 puff into the lungs every 12 hours 60 each 3        Medications patient taking as of now reconciled against medications ordered at time of hospital discharge: Yes    A comprehensive review of systems was negative except for what was noted in the HPI. Patient denies any exertional chest pain, dyspnea, palpitations, syncope, orthopnea, edema or paroxysmal nocturnal dyspnea. Objective:    /70 (Site: Left Upper Arm)   Pulse (!) 104   Ht 5' 5\" (1.651 m)   Wt 122 lb 9.6 oz (55.6 kg)   SpO2 99%   BMI 20.40 kg/m²   Skin: warm and dry, no rash or erythema  Head: normocephalic and atraumatic  Eyes: pupils equal, round, and reactive to light, extraocular eye movements intact, conjunctivae normal  Pulmonary/Chest: Bilateral few scattered wheezing, no respiratory distress. No cardiovascular: normal rate, normal S1 and S2, no gallops, intact distal pulses, and no carotid bruits  Extremities: no cyanosis and no clubbing  Musculoskeletal: Patient is wearing back brace. Subjective pain at the lower thoracic spine. Slow steady gait. Denies bladder or bowel incontinence. An electronic signature was used to authenticate this note. An After Visit Summary was printed and given to the patient. Documentation was done using voice recognition dragon software. Every effort was made to ensure accuracy; however, inadvertent  Unintentional computerized transcription errors may be present.      --Yennifer Mccord MD

## 2022-09-26 DIAGNOSIS — Z79.899 HIGH RISK MEDICATION USE: ICD-10-CM

## 2022-09-26 DIAGNOSIS — E87.1 HYPONATREMIA: ICD-10-CM

## 2022-09-26 LAB
ANION GAP SERPL CALCULATED.3IONS-SCNC: 19 MMOL/L (ref 3–16)
BUN BLDV-MCNC: 4 MG/DL (ref 7–20)
CALCIUM SERPL-MCNC: 9.7 MG/DL (ref 8.3–10.6)
CHLORIDE BLD-SCNC: 95 MMOL/L (ref 99–110)
CO2: 22 MMOL/L (ref 21–32)
CREAT SERPL-MCNC: 0.8 MG/DL (ref 0.8–1.3)
GFR AFRICAN AMERICAN: >60
GFR NON-AFRICAN AMERICAN: >60
GLUCOSE BLD-MCNC: 129 MG/DL (ref 70–99)
MAGNESIUM: 1.8 MG/DL (ref 1.8–2.4)
POTASSIUM SERPL-SCNC: 3.9 MMOL/L (ref 3.5–5.1)
SODIUM BLD-SCNC: 136 MMOL/L (ref 136–145)

## 2022-09-27 NOTE — RESULT ENCOUNTER NOTE
I cannot find urine drug test result. Patient's sodium improved. Continue to stay away from alcohol.

## 2022-10-01 LAB
6-ACETYLMORPHINE: NOT DETECTED
7-AMINOCLONAZEPAM: NOT DETECTED
ALPHA-OH-ALPRAZOLAM: NOT DETECTED
ALPHA-OH-MIDAZOLAM, URINE: NOT DETECTED
ALPRAZOLAM: NOT DETECTED
AMPHETAMINE: NOT DETECTED
BARBITURATES: NOT DETECTED
BENZOYLECGONINE: NOT DETECTED
BUPRENORPHINE: NOT DETECTED
CARISOPRODOL: NOT DETECTED
CLONAZEPAM: NOT DETECTED
CODEINE: NOT DETECTED
CREATININE URINE: 86 MG/DL (ref 20–400)
DIAZEPAM: NOT DETECTED
DRUGS EXPECTED: NORMAL
EER PAIN MGT DRUG PANEL, HIGH RES/EMIT U: NORMAL
ETHYL GLUCURONIDE: PRESENT
FENTANYL: NOT DETECTED
GABAPENTIN: NOT DETECTED
HYDROCODONE: NOT DETECTED
HYDROMORPHONE: NOT DETECTED
LORAZEPAM: NOT DETECTED
MARIJUANA METABOLITE: NOT DETECTED
MDA: NOT DETECTED
MDEA: NOT DETECTED
MDMA URINE: NOT DETECTED
MEPERIDINE: NOT DETECTED
METHADONE: NOT DETECTED
METHAMPHETAMINE: NOT DETECTED
METHYLPHENIDATE: NOT DETECTED
MIDAZOLAM: NOT DETECTED
MORPHINE: NOT DETECTED
NALOXONE: NOT DETECTED
NORBUPRENORPHINE, FREE: NOT DETECTED
NORDIAZEPAM: NOT DETECTED
NORFENTANYL: NOT DETECTED
NORHYDROCODONE, URINE: NOT DETECTED
NOROXYCODONE: PRESENT
NOROXYMORPHONE, URINE: NOT DETECTED
OXAZEPAM: PRESENT
OXYCODONE: PRESENT
OXYMORPHONE: PRESENT
PAIN MANAGEMENT DRUG PANEL: NORMAL
PAIN MANAGEMENT DRUG PANEL: NORMAL
PCP: NOT DETECTED
PHENTERMINE: NOT DETECTED
PREGABALIN: NOT DETECTED
TAPENTADOL, URINE: NOT DETECTED
TAPENTADOL-O-SULFATE, URINE: NOT DETECTED
TEMAZEPAM: NOT DETECTED
TRAMADOL: NOT DETECTED
ZOLPIDEM: NOT DETECTED

## 2022-10-21 PROBLEM — Z09 HOSPITAL DISCHARGE FOLLOW-UP: Status: RESOLVED | Noted: 2022-09-21 | Resolved: 2022-10-21

## 2022-12-13 DIAGNOSIS — I10 ESSENTIAL HYPERTENSION: ICD-10-CM

## 2022-12-13 DIAGNOSIS — Z86.73 H/O: CVA (CEREBROVASCULAR ACCIDENT): ICD-10-CM

## 2022-12-13 DIAGNOSIS — E78.5 DYSLIPIDEMIA: ICD-10-CM

## 2022-12-13 RX ORDER — ATORVASTATIN CALCIUM 20 MG/1
TABLET, FILM COATED ORAL
Qty: 90 TABLET | Refills: 0 | Status: SHIPPED | OUTPATIENT
Start: 2022-12-13

## 2022-12-13 RX ORDER — LOSARTAN POTASSIUM 100 MG/1
TABLET ORAL
Qty: 90 TABLET | Refills: 0 | Status: SHIPPED | OUTPATIENT
Start: 2022-12-13

## 2022-12-13 NOTE — TELEPHONE ENCOUNTER
Patient was due for follow up visit around 10/19/22. Future Appointments    This patient does not currently have any appointments scheduled.   Past Visits    Date Provider Specialty Visit Type Primary Dx   09/21/2022 Guillaume Restrepo MD Internal Medicine Office Visit High risk medication use   07/21/2022 Guillaume Restrepo MD Internal Medicine Office Visit Essential hypertension

## 2022-12-20 DIAGNOSIS — R25.2 LEG CRAMP: ICD-10-CM

## 2022-12-20 RX ORDER — ROPINIROLE 0.5 MG/1
TABLET, FILM COATED ORAL
Qty: 60 TABLET | Refills: 0 | Status: SHIPPED | OUTPATIENT
Start: 2022-12-20 | End: 2023-01-24

## 2022-12-20 NOTE — TELEPHONE ENCOUNTER
Patient was due for follow up visit around 10/19/22. Future Appointments    This patient does not currently have any appointments scheduled.   Past Visits    Date Provider Specialty Visit Type Primary Dx   09/21/2022 Erick Saenz MD Internal Medicine Office Visit High risk medication use   07/21/2022 Erick Saenz MD Internal Medicine Office Visit Essential hypertension   06/16/2022 Erick Saenz MD Internal Medicine Office Visit Cervical paraspinal muscle spasm

## 2023-01-04 ENCOUNTER — OFFICE VISIT (OUTPATIENT)
Dept: INTERNAL MEDICINE CLINIC | Age: 68
End: 2023-01-04
Payer: MEDICARE

## 2023-01-04 VITALS
BODY MASS INDEX: 20.3 KG/M2 | SYSTOLIC BLOOD PRESSURE: 102 MMHG | WEIGHT: 122 LBS | HEART RATE: 76 BPM | OXYGEN SATURATION: 98 % | DIASTOLIC BLOOD PRESSURE: 70 MMHG

## 2023-01-04 DIAGNOSIS — J44.1 COPD EXACERBATION (HCC): Primary | ICD-10-CM

## 2023-01-04 PROCEDURE — 3078F DIAST BP <80 MM HG: CPT | Performed by: NURSE PRACTITIONER

## 2023-01-04 PROCEDURE — 1123F ACP DISCUSS/DSCN MKR DOCD: CPT | Performed by: NURSE PRACTITIONER

## 2023-01-04 PROCEDURE — 3023F SPIROM DOC REV: CPT | Performed by: NURSE PRACTITIONER

## 2023-01-04 PROCEDURE — 3074F SYST BP LT 130 MM HG: CPT | Performed by: NURSE PRACTITIONER

## 2023-01-04 PROCEDURE — G8484 FLU IMMUNIZE NO ADMIN: HCPCS | Performed by: NURSE PRACTITIONER

## 2023-01-04 PROCEDURE — 4004F PT TOBACCO SCREEN RCVD TLK: CPT | Performed by: NURSE PRACTITIONER

## 2023-01-04 PROCEDURE — G8427 DOCREV CUR MEDS BY ELIG CLIN: HCPCS | Performed by: NURSE PRACTITIONER

## 2023-01-04 PROCEDURE — 3017F COLORECTAL CA SCREEN DOC REV: CPT | Performed by: NURSE PRACTITIONER

## 2023-01-04 PROCEDURE — G8420 CALC BMI NORM PARAMETERS: HCPCS | Performed by: NURSE PRACTITIONER

## 2023-01-04 PROCEDURE — 99213 OFFICE O/P EST LOW 20 MIN: CPT | Performed by: NURSE PRACTITIONER

## 2023-01-04 RX ORDER — PREDNISONE 20 MG/1
40 TABLET ORAL DAILY
Qty: 10 TABLET | Refills: 0 | Status: SHIPPED | OUTPATIENT
Start: 2023-01-04 | End: 2023-01-09

## 2023-01-04 RX ORDER — FLUTICASONE PROPIONATE AND SALMETEROL 250; 50 UG/1; UG/1
1 POWDER RESPIRATORY (INHALATION) EVERY 12 HOURS
Qty: 60 EACH | Refills: 3 | Status: SHIPPED | OUTPATIENT
Start: 2023-01-04

## 2023-01-04 RX ORDER — AZITHROMYCIN 250 MG/1
TABLET, FILM COATED ORAL
Qty: 1 PACKET | Refills: 0 | Status: SHIPPED | OUTPATIENT
Start: 2023-01-04 | End: 2023-01-09

## 2023-01-04 SDOH — ECONOMIC STABILITY: FOOD INSECURITY: WITHIN THE PAST 12 MONTHS, THE FOOD YOU BOUGHT JUST DIDN'T LAST AND YOU DIDN'T HAVE MONEY TO GET MORE.: NEVER TRUE

## 2023-01-04 SDOH — ECONOMIC STABILITY: FOOD INSECURITY: WITHIN THE PAST 12 MONTHS, YOU WORRIED THAT YOUR FOOD WOULD RUN OUT BEFORE YOU GOT MONEY TO BUY MORE.: NEVER TRUE

## 2023-01-04 ASSESSMENT — PATIENT HEALTH QUESTIONNAIRE - PHQ9
8. MOVING OR SPEAKING SO SLOWLY THAT OTHER PEOPLE COULD HAVE NOTICED. OR THE OPPOSITE, BEING SO FIGETY OR RESTLESS THAT YOU HAVE BEEN MOVING AROUND A LOT MORE THAN USUAL: 0
SUM OF ALL RESPONSES TO PHQ QUESTIONS 1-9: 0
10. IF YOU CHECKED OFF ANY PROBLEMS, HOW DIFFICULT HAVE THESE PROBLEMS MADE IT FOR YOU TO DO YOUR WORK, TAKE CARE OF THINGS AT HOME, OR GET ALONG WITH OTHER PEOPLE: 0
1. LITTLE INTEREST OR PLEASURE IN DOING THINGS: 0
3. TROUBLE FALLING OR STAYING ASLEEP: 0
SUM OF ALL RESPONSES TO PHQ QUESTIONS 1-9: 0
SUM OF ALL RESPONSES TO PHQ QUESTIONS 1-9: 0
6. FEELING BAD ABOUT YOURSELF - OR THAT YOU ARE A FAILURE OR HAVE LET YOURSELF OR YOUR FAMILY DOWN: 0
2. FEELING DOWN, DEPRESSED OR HOPELESS: 0
7. TROUBLE CONCENTRATING ON THINGS, SUCH AS READING THE NEWSPAPER OR WATCHING TELEVISION: 0
9. THOUGHTS THAT YOU WOULD BE BETTER OFF DEAD, OR OF HURTING YOURSELF: 0
5. POOR APPETITE OR OVEREATING: 0
SUM OF ALL RESPONSES TO PHQ9 QUESTIONS 1 & 2: 0
SUM OF ALL RESPONSES TO PHQ QUESTIONS 1-9: 0
4. FEELING TIRED OR HAVING LITTLE ENERGY: 0

## 2023-01-04 ASSESSMENT — SOCIAL DETERMINANTS OF HEALTH (SDOH): HOW HARD IS IT FOR YOU TO PAY FOR THE VERY BASICS LIKE FOOD, HOUSING, MEDICAL CARE, AND HEATING?: NOT HARD AT ALL

## 2023-01-04 NOTE — PROGRESS NOTES
SUBJECTIVE:    Patient ID: Kim Hopkins is a 75 y.o. male. CC: Cough    HPI: The patient presents to the office for an acute visit. Coughing for months. More productive cough. Headache, fatigue. No fever. History of COPD. Still smoking. Not taking Advair. He is using albuterol. COVID testing neg      Current Outpatient Medications   Medication Sig Dispense Refill    rOPINIRole (REQUIP) 0.5 MG tablet TAKE ONE TO TWO TABLETS BY MOUTH EVERY NIGHT AT BEDTIME 60 tablet 0    atorvastatin (LIPITOR) 20 MG tablet TAKE ONE TABLET BY MOUTH DAILY 90 tablet 0    losartan (COZAAR) 100 MG tablet TAKE ONE TABLET BY MOUTH DAILY 90 tablet 0    albuterol sulfate HFA (PROAIR HFA) 108 (90 Base) MCG/ACT inhaler INHALE TWO PUFFS BY MOUTH EVERY 6 HOURS AS NEEDED FOR WHEEZING 1 each 3    amLODIPine (NORVASC) 10 MG tablet TAKE ONE TABLET BY MOUTH DAILY 90 tablet 1    ASPIRIN LOW DOSE 81 MG EC tablet TAKE ONE TABLET BY MOUTH DAILY 90 tablet 1    gabapentin (NEURONTIN) 300 MG capsule Take 2 capsules by mouth in the morning and 2 capsules at noon and 2 capsules before bedtime. Do all this for 30 days. 90 capsule 2    topiramate (TOPAMAX) 25 MG tablet 1 tab in am and 2 tablet in pm 90 tablet 2    metoprolol tartrate (LOPRESSOR) 50 MG tablet Take 1 tablet by mouth in the morning and 1 tablet before bedtime. 60 tablet 2    tiZANidine (ZANAFLEX) 2 MG tablet Take 1 tablet by mouth every 8 hours as needed (leg spasm) 90 tablet 0    fluticasone-salmeterol (ADVAIR DISKUS) 100-50 MCG/DOSE diskus inhaler Inhale 1 puff into the lungs every 12 hours 60 each 3    calcitonin (MIACALCIN) 200 UNIT/ACT nasal spray 1 spray by Nasal route daily (Patient not taking: Reported on 1/4/2023) 3.7 mL 1     No current facility-administered medications for this visit. Review of Systems   Constitutional:  Positive for fatigue. Negative for fever. Respiratory:  Positive for cough. Neurological:  Positive for headaches. OBJECTIVE:  Physical Exam  Constitutional:       Appearance: Normal appearance. HENT:      Head: Normocephalic and atraumatic. Cardiovascular:      Rate and Rhythm: Normal rate and regular rhythm. Pulmonary:      Effort: Pulmonary effort is normal.      Breath sounds: Decreased breath sounds and wheezing present. Skin:     General: Skin is warm and dry. Neurological:      General: No focal deficit present. Mental Status: He is alert and oriented to person, place, and time. Psychiatric:         Mood and Affect: Mood normal.         Behavior: Behavior normal.      /70 (Site: Left Upper Arm, Position: Sitting, Cuff Size: Medium Adult)   Pulse 76   Wt 122 lb (55.3 kg)   SpO2 98%   BMI 20.30 kg/m²      PHQ Scores 1/4/2023 9/21/2022 1/19/2022 12/30/2020 12/28/2020 9/15/2020 3/17/2020   PHQ2 Score 0 0 0 0 0 0 2   PHQ9 Score 0 9 0 0 0 0 2     Interpretation of Total Score Depression Severity: 1-4 = Minimal depression, 5-9 = Mild depression, 10-14 = Moderate depression, 15-19 = Moderately severe depression, 20-27 =Severe depression        ASSESSMENT/PLAN:  Margarita Stinson was seen today for cough and arm pain. Diagnoses and all orders for this visit:    COPD exacerbation ([facility] [address]  - Acute on chronic resp symptoms  - COVID neg  - Treat for COPD exacerbation  - Follow-up prn    -     azithromycin (ZITHROMAX) 250 MG tablet; Take 2 tabs (500 mg) on Day 1, and take 1 tab (250 mg) on days 2 through 5.  -     Spacer/Aero-Holding Chambers SKIP; 1 Device by Does not apply route daily as needed (prn albuterol)  -     predniSONE (DELTASONE) 20 MG tablet; Take 2 tablets by mouth daily for 5 days  -     fluticasone-salmeterol (ADVAIR DISKUS) 250-50 MCG/ACT AEPB diskus inhaler; Inhale 1 puff into the lungs in the morning and 1 puff in the evening.         Celso Sanz, ROXANN - CNP

## 2023-01-11 ASSESSMENT — ENCOUNTER SYMPTOMS: COUGH: 1

## 2023-01-24 DIAGNOSIS — R25.2 LEG CRAMP: ICD-10-CM

## 2023-01-24 RX ORDER — ROPINIROLE 0.5 MG/1
TABLET, FILM COATED ORAL
Qty: 60 TABLET | Refills: 0 | Status: SHIPPED | OUTPATIENT
Start: 2023-01-24 | End: 2023-02-23

## 2023-01-24 NOTE — TELEPHONE ENCOUNTER
Pt was due for f/u OV around 10/19/22. Future Appointments    This patient does not currently have any appointments scheduled.   Past Visits    Date Provider Specialty Visit Type Primary Dx   01/04/2023 ROXANN Heredia - CNP Internal Medicine Office Visit COPD exacerbation [facility])   09/21/2022 ALEJA Carlson MD Internal Medicine Office Visit High risk medication use

## 2023-01-30 DIAGNOSIS — R25.2 LEG CRAMP: ICD-10-CM

## 2023-01-30 RX ORDER — ROPINIROLE 0.5 MG/1
TABLET, FILM COATED ORAL
Qty: 60 TABLET | Refills: 0 | OUTPATIENT
Start: 2023-01-30

## 2023-02-23 DIAGNOSIS — G89.29 CHRONIC NONINTRACTABLE HEADACHE, UNSPECIFIED HEADACHE TYPE: ICD-10-CM

## 2023-02-23 DIAGNOSIS — R51.9 CHRONIC NONINTRACTABLE HEADACHE, UNSPECIFIED HEADACHE TYPE: ICD-10-CM

## 2023-02-23 DIAGNOSIS — R25.2 LEG CRAMP: ICD-10-CM

## 2023-02-23 DIAGNOSIS — I10 ESSENTIAL HYPERTENSION: ICD-10-CM

## 2023-02-23 RX ORDER — ROPINIROLE 0.5 MG/1
TABLET, FILM COATED ORAL
Qty: 60 TABLET | Refills: 0 | Status: SHIPPED | OUTPATIENT
Start: 2023-02-23 | End: 2023-03-27

## 2023-02-23 RX ORDER — METOPROLOL TARTRATE 50 MG/1
TABLET, FILM COATED ORAL
Qty: 60 TABLET | Refills: 0 | Status: SHIPPED | OUTPATIENT
Start: 2023-02-23 | End: 2023-03-27

## 2023-02-23 RX ORDER — TOPIRAMATE 25 MG/1
TABLET ORAL
Qty: 90 TABLET | Refills: 0 | Status: SHIPPED | OUTPATIENT
Start: 2023-02-23 | End: 2023-03-27

## 2023-03-25 DIAGNOSIS — I10 ESSENTIAL HYPERTENSION: ICD-10-CM

## 2023-03-25 DIAGNOSIS — Z86.73 H/O: CVA (CEREBROVASCULAR ACCIDENT): ICD-10-CM

## 2023-03-25 DIAGNOSIS — E78.5 DYSLIPIDEMIA: ICD-10-CM

## 2023-03-26 DIAGNOSIS — G89.29 CHRONIC NONINTRACTABLE HEADACHE, UNSPECIFIED HEADACHE TYPE: ICD-10-CM

## 2023-03-26 DIAGNOSIS — I10 ESSENTIAL HYPERTENSION: ICD-10-CM

## 2023-03-26 DIAGNOSIS — R51.9 CHRONIC NONINTRACTABLE HEADACHE, UNSPECIFIED HEADACHE TYPE: ICD-10-CM

## 2023-03-26 DIAGNOSIS — R25.2 LEG CRAMP: ICD-10-CM

## 2023-03-27 RX ORDER — AMLODIPINE BESYLATE 10 MG/1
TABLET ORAL
Qty: 90 TABLET | Refills: 1 | Status: SHIPPED | OUTPATIENT
Start: 2023-03-27

## 2023-03-27 RX ORDER — TOPIRAMATE 25 MG/1
TABLET ORAL
Qty: 90 TABLET | Refills: 0 | Status: SHIPPED | OUTPATIENT
Start: 2023-03-27

## 2023-03-27 RX ORDER — ATORVASTATIN CALCIUM 20 MG/1
TABLET, FILM COATED ORAL
Qty: 90 TABLET | Refills: 1 | Status: SHIPPED | OUTPATIENT
Start: 2023-03-27

## 2023-03-27 RX ORDER — METOPROLOL TARTRATE 50 MG/1
TABLET, FILM COATED ORAL
Qty: 60 TABLET | Refills: 0 | Status: SHIPPED | OUTPATIENT
Start: 2023-03-27

## 2023-03-27 RX ORDER — ROPINIROLE 0.5 MG/1
TABLET, FILM COATED ORAL
Qty: 60 TABLET | Refills: 0 | Status: SHIPPED | OUTPATIENT
Start: 2023-03-27

## 2023-03-27 RX ORDER — LOSARTAN POTASSIUM 100 MG/1
TABLET ORAL
Qty: 90 TABLET | Refills: 1 | Status: SHIPPED | OUTPATIENT
Start: 2023-03-27

## 2023-03-29 ENCOUNTER — OFFICE VISIT (OUTPATIENT)
Dept: INTERNAL MEDICINE CLINIC | Age: 68
End: 2023-03-29

## 2023-03-29 VITALS
BODY MASS INDEX: 21.29 KG/M2 | HEIGHT: 65 IN | HEART RATE: 72 BPM | OXYGEN SATURATION: 95 % | WEIGHT: 127.8 LBS | DIASTOLIC BLOOD PRESSURE: 68 MMHG | SYSTOLIC BLOOD PRESSURE: 110 MMHG

## 2023-03-29 DIAGNOSIS — Z12.5 SCREENING FOR PROSTATE CANCER: ICD-10-CM

## 2023-03-29 DIAGNOSIS — M47.812 SPONDYLOSIS OF CERVICAL REGION WITHOUT MYELOPATHY OR RADICULOPATHY: ICD-10-CM

## 2023-03-29 DIAGNOSIS — M62.838 CERVICAL PARASPINAL MUSCLE SPASM: ICD-10-CM

## 2023-03-29 DIAGNOSIS — I10 ESSENTIAL HYPERTENSION: Primary | ICD-10-CM

## 2023-03-29 DIAGNOSIS — S49.91XD INJURY OF RIGHT SHOULDER, SUBSEQUENT ENCOUNTER: ICD-10-CM

## 2023-03-29 DIAGNOSIS — M17.12 ARTHRITIS OF KNEE, LEFT: ICD-10-CM

## 2023-03-29 DIAGNOSIS — R56.9 SEIZURE (HCC): ICD-10-CM

## 2023-03-29 DIAGNOSIS — F33.42 RECURRENT MAJOR DEPRESSIVE DISORDER, IN FULL REMISSION (HCC): ICD-10-CM

## 2023-03-29 DIAGNOSIS — E78.5 DYSLIPIDEMIA: ICD-10-CM

## 2023-03-29 RX ORDER — PREDNISONE 10 MG/1
10 TABLET ORAL 2 TIMES DAILY
Qty: 14 TABLET | Refills: 0 | Status: SHIPPED | OUTPATIENT
Start: 2023-03-29 | End: 2023-04-05

## 2023-03-29 RX ORDER — GABAPENTIN 300 MG/1
300 CAPSULE ORAL
Qty: 90 CAPSULE | Refills: 0 | Status: SHIPPED | OUTPATIENT
Start: 2023-03-29 | End: 2023-06-27

## 2023-03-29 SDOH — ECONOMIC STABILITY: INCOME INSECURITY: HOW HARD IS IT FOR YOU TO PAY FOR THE VERY BASICS LIKE FOOD, HOUSING, MEDICAL CARE, AND HEATING?: NOT HARD AT ALL

## 2023-03-29 SDOH — ECONOMIC STABILITY: FOOD INSECURITY: WITHIN THE PAST 12 MONTHS, YOU WORRIED THAT YOUR FOOD WOULD RUN OUT BEFORE YOU GOT MONEY TO BUY MORE.: NEVER TRUE

## 2023-03-29 SDOH — ECONOMIC STABILITY: HOUSING INSECURITY
IN THE LAST 12 MONTHS, WAS THERE A TIME WHEN YOU DID NOT HAVE A STEADY PLACE TO SLEEP OR SLEPT IN A SHELTER (INCLUDING NOW)?: NO

## 2023-03-29 SDOH — ECONOMIC STABILITY: FOOD INSECURITY: WITHIN THE PAST 12 MONTHS, THE FOOD YOU BOUGHT JUST DIDN'T LAST AND YOU DIDN'T HAVE MONEY TO GET MORE.: NEVER TRUE

## 2023-03-29 ASSESSMENT — ENCOUNTER SYMPTOMS
VOMITING: 0
WHEEZING: 0
PHOTOPHOBIA: 0
SHORTNESS OF BREATH: 0
ABDOMINAL PAIN: 0
NAUSEA: 0

## 2023-03-29 NOTE — PROGRESS NOTES
09/15/2022 09:34 PM    URINETYPE NotGiven 09/15/2022 09:34 PM     HBA1C:   Lab Results   Component Value Date/Time    LABA1C 5.4 10/08/2012 02:10 PM    .3 10/08/2012 02:10 PM     MICRO/ALB:   Lab Results   Component Value Date/Time    LABMICR YES 09/15/2022 09:34 PM    LABCREA 86.0 09/26/2022 10:04 AM     LIPID:  Lab Results   Component Value Date/Time    CHOL 200 02/28/2018 11:54 AM    TRIG 128 02/28/2018 11:54 AM     01/19/2022 09:59 AM    HDL 68 06/06/2011 01:55 PM    LDLCALC 35 01/19/2022 09:59 AM    LABVLDL 9 01/19/2022 09:59 AM     TSH:   Lab Results   Component Value Date/Time    TSHREFLEX 1.09 10/28/2019 10:11 AM     PSA:   Lab Results   Component Value Date/Time    PSA 1.54 01/19/2022 09:59 AM        ASSESSMENT/PLAN:  Assessment/Plan:  Eunice Wallace was seen today for knee pain and shoulder pain. Diagnoses and all orders for this visit:    Essential hypertension  Patient denies any exertional chest pain, dyspnea, palpitations, syncope, orthopnea, edema or paroxysmal nocturnal dyspnea. No orthostatic symptoms. Continue current losartan amlodipine metoprolol  Recurrent major depressive disorder, in full remission [facility])  He feels overall his depression is much better. He has reduced significantly he is alcohol intake. He is not taking any traditional antidepressant however he is on Topamax. Patient denies any manic symptoms or suicidal homicidal ideation. Seizure [facility])  Patient not sure whether he continue to follow-up with the neurologist in [facility] and not. He cannot recall any further episode of seizure activity. He has been taking Topamax. Cervical paraspinal muscle spasm  refill-     gabapentin (NEURONTIN) 300 MG capsule; Take 1 capsule by mouth nightly for 90 days. Spondylosis of cervical region without myelopathy or radiculopathy  refill-     gabapentin (NEURONTIN) 300 MG capsule; Take 1 capsule by mouth nightly for 90 days.     Injury of right shoulder, subsequent

## 2023-04-27 DIAGNOSIS — R51.9 CHRONIC NONINTRACTABLE HEADACHE, UNSPECIFIED HEADACHE TYPE: ICD-10-CM

## 2023-04-27 DIAGNOSIS — I10 ESSENTIAL HYPERTENSION: ICD-10-CM

## 2023-04-27 DIAGNOSIS — G89.29 CHRONIC NONINTRACTABLE HEADACHE, UNSPECIFIED HEADACHE TYPE: ICD-10-CM

## 2023-04-27 DIAGNOSIS — R25.2 LEG CRAMP: ICD-10-CM

## 2023-04-27 RX ORDER — ROPINIROLE 0.5 MG/1
TABLET, FILM COATED ORAL
Qty: 60 TABLET | Refills: 2 | Status: SHIPPED | OUTPATIENT
Start: 2023-04-27

## 2023-04-27 RX ORDER — TOPIRAMATE 25 MG/1
TABLET ORAL
Qty: 90 TABLET | Refills: 2 | Status: SHIPPED | OUTPATIENT
Start: 2023-04-27

## 2023-04-27 RX ORDER — METOPROLOL TARTRATE 50 MG/1
TABLET, FILM COATED ORAL
Qty: 60 TABLET | Refills: 2 | Status: SHIPPED | OUTPATIENT
Start: 2023-04-27

## 2023-05-19 ENCOUNTER — OFFICE VISIT (OUTPATIENT)
Dept: INTERNAL MEDICINE CLINIC | Age: 68
End: 2023-05-19

## 2023-05-19 VITALS
SYSTOLIC BLOOD PRESSURE: 134 MMHG | HEIGHT: 65 IN | BODY MASS INDEX: 21.13 KG/M2 | DIASTOLIC BLOOD PRESSURE: 80 MMHG | HEART RATE: 68 BPM | OXYGEN SATURATION: 96 % | WEIGHT: 126.8 LBS

## 2023-05-19 DIAGNOSIS — I10 ESSENTIAL HYPERTENSION: ICD-10-CM

## 2023-05-19 DIAGNOSIS — S22.081A T12 BURST FRACTURE (HCC): ICD-10-CM

## 2023-05-19 DIAGNOSIS — J44.1 COPD EXACERBATION (HCC): ICD-10-CM

## 2023-05-19 DIAGNOSIS — J43.9 PULMONARY EMPHYSEMA, UNSPECIFIED EMPHYSEMA TYPE (HCC): ICD-10-CM

## 2023-05-19 DIAGNOSIS — E87.1 HYPONATREMIA: Primary | ICD-10-CM

## 2023-05-19 RX ORDER — FLUTICASONE PROPIONATE AND SALMETEROL 250; 50 UG/1; UG/1
1 POWDER RESPIRATORY (INHALATION) EVERY 12 HOURS
Qty: 60 EACH | Refills: 3 | Status: SHIPPED | OUTPATIENT
Start: 2023-05-19

## 2023-05-19 RX ORDER — MELOXICAM 15 MG/1
15 TABLET ORAL DAILY
COMMUNITY
Start: 2023-04-10

## 2023-05-19 RX ORDER — ISOSORBIDE MONONITRATE 30 MG/1
30 TABLET, EXTENDED RELEASE ORAL DAILY
COMMUNITY
Start: 2023-04-08

## 2023-05-19 RX ORDER — CALCITONIN SALMON 200 [IU]/.09ML
1 SPRAY, METERED NASAL DAILY
Qty: 3.7 ML | Refills: 1 | Status: CANCELLED | OUTPATIENT
Start: 2023-05-19

## 2023-05-19 RX ORDER — CALCITONIN SALMON 200 [IU]/.09ML
1 SPRAY, METERED NASAL DAILY
Qty: 3.7 ML | Refills: 1 | Status: SHIPPED | OUTPATIENT
Start: 2023-05-19

## 2023-05-19 ASSESSMENT — ENCOUNTER SYMPTOMS
TROUBLE SWALLOWING: 0
PHOTOPHOBIA: 0
NAUSEA: 0
VOMITING: 0
ABDOMINAL PAIN: 0
WHEEZING: 0
VOICE CHANGE: 0
DIARRHEA: 0
SHORTNESS OF BREATH: 0

## 2023-05-19 NOTE — PROGRESS NOTES
White Tom  1955  male  75 y.o. SUBJECTIVE:       Chief Complaint   Patient presents with    Follow-up     ED 5/16       HPI:  Patient was recently seen in [facility] emergency room because of dehydration as well as low blood pressure. Patient report he felt dizzy and went to the emergency room. He had complete work-up. Since emergency room visit he is not having any further dizziness. He also have appointment with cardiologist in a week. His recent lab work including CT scan of the head has been reviewed. Patient report he is no longer drinking that months. He does drink plenty of fluid. His sodium was 132. He denies chest pain shortness of breath fever chills nausea vomiting diarrhea. Patient denies any urinary symptoms. Patient denies any exertional chest pain, dyspnea, palpitations, syncope, orthopnea, edema or paroxysmal nocturnal dyspnea. He has chronic mid back pain and has been following up with pain clinic and going for additional testing. He is requesting refill of calcitonin.     Past Medical History:   Diagnosis Date    Acute encephalopathy 12/11/2017    Alcohol dependence ([address] 1/8/8827    Alcoholic liver disease ([address] 12/11/2017    Anemia 12/11/2017    Anxiety     Arthritis of carpometacarpal [place]) joint of left thumb 9/14/2017    Arthritis of left hip 6/22/2017    Arthritis of left knee 6/22/2017    Chest tightness     Continuous visual hallucinations 12/11/2017    Coronary artery disease involving native heart without angina pectoris 12/8/2021    DDD (degenerative disc disease), lumbosacral 12/15/2014    Delirium due to known physiological condition     Delirium tremens ([address] 12/11/2017    Episode of syncope 10/9/2013    Erectile dysfunction     Generalized osteoarthritis of multiple sites     Hematoma of groin     Hiatal hernia     PER CXR    Hiatal hernia with GERD 3/17/2020    Hypertension     Hyponatremia 12/11/2017    Intractable migraine without aura

## 2023-08-15 ENCOUNTER — TELEPHONE (OUTPATIENT)
Dept: INTERNAL MEDICINE CLINIC | Age: 68
End: 2023-08-15

## 2023-08-15 NOTE — TELEPHONE ENCOUNTER
Recent ER visit received by Dr. Joe Benavidez.    Patient was called to schedule office visit - still having lots of back pain. OV scheduled for tomorrow.

## 2023-09-06 ENCOUNTER — HOSPITAL ENCOUNTER (OUTPATIENT)
Dept: VASCULAR LAB | Age: 68
Discharge: HOME | End: 2023-09-06
Attending: INTERNAL MEDICINE
Payer: MEDICARE

## 2023-09-06 ENCOUNTER — OFFICE VISIT (OUTPATIENT)
Dept: INTERNAL MEDICINE CLINIC | Age: 68
End: 2023-09-06

## 2023-09-06 VITALS
BODY MASS INDEX: 20.19 KG/M2 | SYSTOLIC BLOOD PRESSURE: 122 MMHG | OXYGEN SATURATION: 99 % | HEIGHT: 65 IN | DIASTOLIC BLOOD PRESSURE: 68 MMHG | WEIGHT: 121.2 LBS | HEART RATE: 66 BPM

## 2023-09-06 DIAGNOSIS — Z86.73 H/O: CVA (CEREBROVASCULAR ACCIDENT): ICD-10-CM

## 2023-09-06 DIAGNOSIS — M79.89 RIGHT LEG SWELLING: ICD-10-CM

## 2023-09-06 DIAGNOSIS — E78.5 DYSLIPIDEMIA: ICD-10-CM

## 2023-09-06 DIAGNOSIS — I10 ESSENTIAL HYPERTENSION: ICD-10-CM

## 2023-09-06 DIAGNOSIS — Z09 HOSPITAL DISCHARGE FOLLOW-UP: Primary | ICD-10-CM

## 2023-09-06 DIAGNOSIS — R25.2 LEG CRAMP: ICD-10-CM

## 2023-09-06 DIAGNOSIS — M79.604 RIGHT LEG PAIN: ICD-10-CM

## 2023-09-06 PROCEDURE — 93971 EXTREMITY STUDY: CPT

## 2023-09-06 RX ORDER — LIDOCAINE 50 MG/G
1 PATCH TOPICAL DAILY
COMMUNITY
Start: 2023-08-08

## 2023-09-06 RX ORDER — ROPINIROLE 0.5 MG/1
TABLET, FILM COATED ORAL
Qty: 60 TABLET | Refills: 2 | Status: SHIPPED | OUTPATIENT
Start: 2023-09-06

## 2023-09-06 RX ORDER — ATORVASTATIN CALCIUM 20 MG/1
20 TABLET, FILM COATED ORAL DAILY
Qty: 90 TABLET | Refills: 1 | Status: SHIPPED | OUTPATIENT
Start: 2023-09-06

## 2023-09-06 RX ORDER — LOSARTAN POTASSIUM 100 MG/1
TABLET ORAL
Qty: 90 TABLET | Refills: 1 | Status: SHIPPED | OUTPATIENT
Start: 2023-09-06

## 2023-09-06 RX ORDER — METOPROLOL TARTRATE 50 MG/1
TABLET, FILM COATED ORAL
Qty: 60 TABLET | Refills: 2 | Status: SHIPPED | OUTPATIENT
Start: 2023-09-06

## 2023-09-06 RX ORDER — OXYCODONE HYDROCHLORIDE AND ACETAMINOPHEN 5; 325 MG/1; MG/1
TABLET ORAL
COMMUNITY
Start: 2023-08-08

## 2023-09-06 RX ORDER — AMLODIPINE BESYLATE 10 MG/1
10 TABLET ORAL DAILY
Qty: 90 TABLET | Refills: 1 | Status: SHIPPED | OUTPATIENT
Start: 2023-09-06

## 2023-09-06 RX ORDER — METHOCARBAMOL 500 MG/1
TABLET, FILM COATED ORAL
COMMUNITY
Start: 2023-08-08

## 2023-09-06 RX ORDER — LANOLIN ALCOHOL/MO/W.PET/CERES
3 CREAM (GRAM) TOPICAL NIGHTLY
Qty: 30 TABLET | Refills: 0 | COMMUNITY
Start: 2023-08-20 | End: 2023-09-19

## 2023-09-06 RX ORDER — B-COMPLEX WITH VITAMIN C
2 TABLET ORAL 2 TIMES DAILY
Qty: 120 TABLET | Refills: 0 | COMMUNITY
Start: 2023-08-20 | End: 2023-09-08 | Stop reason: SDUPTHER

## 2023-09-06 RX ORDER — OLANZAPINE 5 MG/1
5 TABLET, ORALLY DISINTEGRATING ORAL NIGHTLY
Qty: 30 TABLET | Refills: 0 | COMMUNITY
Start: 2023-08-20 | End: 2023-09-19

## 2023-09-06 RX ORDER — ENOXAPARIN SODIUM 100 MG/ML
40 INJECTION SUBCUTANEOUS
COMMUNITY
Start: 2023-08-20 | End: 2023-09-19

## 2023-09-06 RX ORDER — LANOLIN ALCOHOL/MO/W.PET/CERES
100 CREAM (GRAM) TOPICAL DAILY
Qty: 30 TABLET | Refills: 0 | COMMUNITY
Start: 2023-08-20 | End: 2023-09-19

## 2023-09-06 NOTE — PROGRESS NOTES
Post-Discharge Transitional Care Management Progress Note      Shiela Meier   YOB: 1955    Date of Office Visit:  9/6/2023  Date of Hospital Admission: 08/19/2023 at the skilled nursing facility following right hip surgery  Date of skilled nursing facility discharge: 09/02/2023    Chief Complaint   Patient presents with    Follow-Up from Hospital       ASSESSMENT/PLAN:   Below is the assessment and plan developed based on review of pertinent history, physical exam, labs, studies, and medications. Hospital discharge follow-up  Status post right hip surgery. Generalized weakness following hospital and nursing home discharge. Patient will continue physical therapy, occupational therapy and skilled nursing  Considering his right leg pain and swelling we will exclude deep venous thrombosis. I have reviewed preliminary result of the Doppler study which failed to show any evidence of deep venous thrombosis. -     [place] DISCHARGE MEDS RECONCILED W/ CURRENT OUTPATIENT MED LIST  Essential hypertension  Excellent blood pressure control. Refill  -     losartan (COZAAR) 100 MG tablet; TAKE ONE TABLET BY MOUTH DAILY, Disp-90 tablet, R-1Normal  -     amLODIPine (NORVASC) 10 MG tablet; Take 1 tablet by mouth daily, Disp-90 tablet, R-1Normal  -     metoprolol tartrate (LOPRESSOR) 50 MG tablet; TAKE ONE TABLET BY MOUTH EVERY MORNING AND TAKE ONE TABLET BY MOUTH EVERY NIGHT AT BEDTIME, Disp-60 tablet, R-2Normal  H/O: CVA (cerebrovascular accident)  No new neurologic symptoms. -     atorvastatin (LIPITOR) 20 MG tablet; Take 1 tablet by mouth daily, Disp-90 tablet, R-1Normal  Dyslipidemia  -     atorvastatin (LIPITOR) 20 MG tablet;  Take 1 tablet by mouth daily, Disp-90 tablet, R-1Normal  Leg cramp  -     rOPINIRole (REQUIP) 0.5 MG tablet; TAKE ONE TO TWO TABLETS BY MOUTH EVERY NIGHT AT BEDTIME, Disp-60 tablet, R-2Normal  Right leg swelling  -     VL Extremity Venous Right; Future  Right leg pain  -     VL

## 2023-09-08 ENCOUNTER — TELEPHONE (OUTPATIENT)
Dept: INTERNAL MEDICINE CLINIC | Age: 68
End: 2023-09-08

## 2023-09-08 DIAGNOSIS — G89.29 CHRONIC NONINTRACTABLE HEADACHE, UNSPECIFIED HEADACHE TYPE: ICD-10-CM

## 2023-09-08 DIAGNOSIS — M62.838 CERVICAL PARASPINAL MUSCLE SPASM: ICD-10-CM

## 2023-09-08 DIAGNOSIS — R51.9 CHRONIC NONINTRACTABLE HEADACHE, UNSPECIFIED HEADACHE TYPE: ICD-10-CM

## 2023-09-08 DIAGNOSIS — M47.812 SPONDYLOSIS OF CERVICAL REGION WITHOUT MYELOPATHY OR RADICULOPATHY: ICD-10-CM

## 2023-09-08 RX ORDER — TOPIRAMATE 25 MG/1
25 TABLET ORAL DAILY
Qty: 90 TABLET | Refills: 1 | Status: SHIPPED | OUTPATIENT
Start: 2023-09-08

## 2023-09-08 RX ORDER — B-COMPLEX WITH VITAMIN C
2 TABLET ORAL 2 TIMES DAILY
Qty: 360 TABLET | Refills: 1 | Status: SHIPPED | OUTPATIENT
Start: 2023-09-08 | End: 2024-03-06

## 2023-09-08 RX ORDER — TIZANIDINE 2 MG/1
2 TABLET ORAL EVERY 8 HOURS PRN
Qty: 90 TABLET | Refills: 0 | Status: SHIPPED | OUTPATIENT
Start: 2023-09-08

## 2023-09-08 RX ORDER — ISOSORBIDE MONONITRATE 30 MG/1
30 TABLET, EXTENDED RELEASE ORAL DAILY
Qty: 90 TABLET | Refills: 1 | Status: SHIPPED | OUTPATIENT
Start: 2023-09-08

## 2023-09-08 NOTE — TELEPHONE ENCOUNTER
Wife asking for refill on oxycodone and librium--does he still needs librium? Not drinking?--doing PT this morning.

## 2023-09-08 NOTE — TELEPHONE ENCOUNTER
Patient does not need Librium and oxycodone  OxyCodone usually prescribed few days after the surgery. He may take muscle relaxant.   New prescription sent

## 2023-09-12 ENCOUNTER — TELEPHONE (OUTPATIENT)
Dept: INTERNAL MEDICINE CLINIC | Age: 68
End: 2023-09-12

## 2023-09-12 DIAGNOSIS — G89.29 CHRONIC NONINTRACTABLE HEADACHE, UNSPECIFIED HEADACHE TYPE: ICD-10-CM

## 2023-09-12 DIAGNOSIS — R51.9 CHRONIC NONINTRACTABLE HEADACHE, UNSPECIFIED HEADACHE TYPE: ICD-10-CM

## 2023-09-12 RX ORDER — TOPIRAMATE 25 MG/1
TABLET ORAL
Qty: 90 TABLET | Refills: 5 | Status: SHIPPED | OUTPATIENT
Start: 2023-09-12

## 2023-10-17 ENCOUNTER — OFFICE VISIT (OUTPATIENT)
Dept: INTERNAL MEDICINE CLINIC | Age: 68
End: 2023-10-17

## 2023-10-17 VITALS
HEART RATE: 69 BPM | WEIGHT: 118.2 LBS | OXYGEN SATURATION: 97 % | DIASTOLIC BLOOD PRESSURE: 68 MMHG | BODY MASS INDEX: 19.69 KG/M2 | HEIGHT: 65 IN | SYSTOLIC BLOOD PRESSURE: 118 MMHG

## 2023-10-17 DIAGNOSIS — M50.121 CERVICAL DISC DISORDER AT C4-C5 LEVEL WITH RADICULOPATHY: ICD-10-CM

## 2023-10-17 DIAGNOSIS — M79.604 RIGHT LEG PAIN: ICD-10-CM

## 2023-10-17 DIAGNOSIS — Z00.00 MEDICARE ANNUAL WELLNESS VISIT, SUBSEQUENT: Primary | ICD-10-CM

## 2023-10-17 DIAGNOSIS — Z23 FLU VACCINE NEED: ICD-10-CM

## 2023-10-17 DIAGNOSIS — M47.812 SPONDYLOSIS OF CERVICAL REGION WITHOUT MYELOPATHY OR RADICULOPATHY: ICD-10-CM

## 2023-10-17 DIAGNOSIS — Z98.890 STATUS POST OPEN REDUCTION AND INTERNAL FIXATION (ORIF) OF FRACTURE: ICD-10-CM

## 2023-10-17 DIAGNOSIS — G89.29 CHRONIC MIDLINE LOW BACK PAIN WITHOUT SCIATICA: ICD-10-CM

## 2023-10-17 DIAGNOSIS — Z87.81 STATUS POST OPEN REDUCTION AND INTERNAL FIXATION (ORIF) OF FRACTURE: ICD-10-CM

## 2023-10-17 DIAGNOSIS — M54.50 CHRONIC MIDLINE LOW BACK PAIN WITHOUT SCIATICA: ICD-10-CM

## 2023-10-17 DIAGNOSIS — M62.838 CERVICAL PARASPINAL MUSCLE SPASM: ICD-10-CM

## 2023-10-17 RX ORDER — METHOCARBAMOL 500 MG/1
500 TABLET, FILM COATED ORAL 3 TIMES DAILY
Qty: 90 TABLET | Refills: 1 | Status: SHIPPED | OUTPATIENT
Start: 2023-10-17

## 2023-10-17 RX ORDER — GABAPENTIN 300 MG/1
600 CAPSULE ORAL
Qty: 90 CAPSULE | Refills: 1 | Status: SHIPPED | OUTPATIENT
Start: 2023-10-17 | End: 2024-01-15

## 2023-10-17 ASSESSMENT — PATIENT HEALTH QUESTIONNAIRE - PHQ9
6. FEELING BAD ABOUT YOURSELF - OR THAT YOU ARE A FAILURE OR HAVE LET YOURSELF OR YOUR FAMILY DOWN: 0
9. THOUGHTS THAT YOU WOULD BE BETTER OFF DEAD, OR OF HURTING YOURSELF: 0
SUM OF ALL RESPONSES TO PHQ QUESTIONS 1-9: 9
SUM OF ALL RESPONSES TO PHQ QUESTIONS 1-9: 9
3. TROUBLE FALLING OR STAYING ASLEEP: 3
7. TROUBLE CONCENTRATING ON THINGS, SUCH AS READING THE NEWSPAPER OR WATCHING TELEVISION: 0
1. LITTLE INTEREST OR PLEASURE IN DOING THINGS: 3
4. FEELING TIRED OR HAVING LITTLE ENERGY: 3
5. POOR APPETITE OR OVEREATING: 0
SUM OF ALL RESPONSES TO PHQ QUESTIONS 1-9: 9
10. IF YOU CHECKED OFF ANY PROBLEMS, HOW DIFFICULT HAVE THESE PROBLEMS MADE IT FOR YOU TO DO YOUR WORK, TAKE CARE OF THINGS AT HOME, OR GET ALONG WITH OTHER PEOPLE: 1
SUM OF ALL RESPONSES TO PHQ QUESTIONS 1-9: 9
8. MOVING OR SPEAKING SO SLOWLY THAT OTHER PEOPLE COULD HAVE NOTICED. OR THE OPPOSITE, BEING SO FIGETY OR RESTLESS THAT YOU HAVE BEEN MOVING AROUND A LOT MORE THAN USUAL: 0
2. FEELING DOWN, DEPRESSED OR HOPELESS: 0
SUM OF ALL RESPONSES TO PHQ9 QUESTIONS 1 & 2: 3

## 2023-10-17 ASSESSMENT — LIFESTYLE VARIABLES
HOW OFTEN DO YOU HAVE A DRINK CONTAINING ALCOHOL: NEVER
HOW MANY STANDARD DRINKS CONTAINING ALCOHOL DO YOU HAVE ON A TYPICAL DAY: PATIENT DOES NOT DRINK

## 2024-01-12 ENCOUNTER — OFFICE VISIT (OUTPATIENT)
Dept: INTERNAL MEDICINE CLINIC | Age: 69
End: 2024-01-12

## 2024-01-12 VITALS
WEIGHT: 121 LBS | OXYGEN SATURATION: 97 % | SYSTOLIC BLOOD PRESSURE: 128 MMHG | DIASTOLIC BLOOD PRESSURE: 76 MMHG | BODY MASS INDEX: 20.14 KG/M2 | HEART RATE: 83 BPM

## 2024-01-12 DIAGNOSIS — Z86.010 PERSONAL HISTORY OF COLONIC POLYPS: Primary | ICD-10-CM

## 2024-01-12 DIAGNOSIS — Z12.11 SCREEN FOR COLON CANCER: ICD-10-CM

## 2024-01-12 DIAGNOSIS — I10 ESSENTIAL HYPERTENSION: ICD-10-CM

## 2024-01-12 DIAGNOSIS — Z87.81 STATUS POST OPEN REDUCTION AND INTERNAL FIXATION (ORIF) OF FRACTURE: ICD-10-CM

## 2024-01-12 DIAGNOSIS — M62.838 CERVICAL PARASPINAL MUSCLE SPASM: ICD-10-CM

## 2024-01-12 DIAGNOSIS — M47.812 SPONDYLOSIS OF CERVICAL REGION WITHOUT MYELOPATHY OR RADICULOPATHY: ICD-10-CM

## 2024-01-12 DIAGNOSIS — E78.5 DYSLIPIDEMIA: ICD-10-CM

## 2024-01-12 DIAGNOSIS — M54.50 CHRONIC MIDLINE LOW BACK PAIN WITHOUT SCIATICA: ICD-10-CM

## 2024-01-12 DIAGNOSIS — G89.29 CHRONIC NONINTRACTABLE HEADACHE, UNSPECIFIED HEADACHE TYPE: ICD-10-CM

## 2024-01-12 DIAGNOSIS — G89.29 CHRONIC MIDLINE LOW BACK PAIN WITHOUT SCIATICA: ICD-10-CM

## 2024-01-12 DIAGNOSIS — M50.121 CERVICAL DISC DISORDER AT C4-C5 LEVEL WITH RADICULOPATHY: ICD-10-CM

## 2024-01-12 DIAGNOSIS — Z98.890 STATUS POST OPEN REDUCTION AND INTERNAL FIXATION (ORIF) OF FRACTURE: ICD-10-CM

## 2024-01-12 DIAGNOSIS — Z86.73 H/O: CVA (CEREBROVASCULAR ACCIDENT): ICD-10-CM

## 2024-01-12 DIAGNOSIS — R56.9 SEIZURE (HCC): ICD-10-CM

## 2024-01-12 DIAGNOSIS — R25.2 LEG CRAMP: ICD-10-CM

## 2024-01-12 DIAGNOSIS — R51.9 CHRONIC NONINTRACTABLE HEADACHE, UNSPECIFIED HEADACHE TYPE: ICD-10-CM

## 2024-01-12 DIAGNOSIS — Z12.5 SCREENING PSA (PROSTATE SPECIFIC ANTIGEN): ICD-10-CM

## 2024-01-12 DIAGNOSIS — J42 CHRONIC BRONCHITIS, UNSPECIFIED CHRONIC BRONCHITIS TYPE (HCC): ICD-10-CM

## 2024-01-12 DIAGNOSIS — F33.1 MODERATE EPISODE OF RECURRENT MAJOR DEPRESSIVE DISORDER (HCC): ICD-10-CM

## 2024-01-12 DIAGNOSIS — M79.604 RIGHT LEG PAIN: ICD-10-CM

## 2024-01-12 PROBLEM — J44.9 CHRONIC OBSTRUCTIVE PULMONARY DISEASE, UNSPECIFIED (HCC): Status: ACTIVE | Noted: 2024-01-12

## 2024-01-12 PROBLEM — F33.42 RECURRENT MAJOR DEPRESSIVE DISORDER, IN FULL REMISSION (HCC): Status: RESOLVED | Noted: 2020-04-16 | Resolved: 2024-01-12

## 2024-01-12 LAB
DEPRECATED RDW RBC AUTO: 13.4 % (ref 12.4–15.4)
HCT VFR BLD AUTO: 42.2 % (ref 40.5–52.5)
HGB BLD-MCNC: 14.4 G/DL (ref 13.5–17.5)
MCH RBC QN AUTO: 31.4 PG (ref 26–34)
MCHC RBC AUTO-ENTMCNC: 34.1 G/DL (ref 31–36)
MCV RBC AUTO: 92.3 FL (ref 80–100)
PLATELET # BLD AUTO: 235 K/UL (ref 135–450)
PMV BLD AUTO: 8.3 FL (ref 5–10.5)
RBC # BLD AUTO: 4.58 M/UL (ref 4.2–5.9)
WBC # BLD AUTO: 5.4 K/UL (ref 4–11)

## 2024-01-12 RX ORDER — ROPINIROLE 0.5 MG/1
TABLET, FILM COATED ORAL
Qty: 90 TABLET | Refills: 1 | Status: SHIPPED | OUTPATIENT
Start: 2024-01-12

## 2024-01-12 RX ORDER — ATORVASTATIN CALCIUM 20 MG/1
20 TABLET, FILM COATED ORAL DAILY
Qty: 90 TABLET | Refills: 1 | Status: SHIPPED | OUTPATIENT
Start: 2024-01-12

## 2024-01-12 RX ORDER — METHOCARBAMOL 500 MG/1
500 TABLET, FILM COATED ORAL 3 TIMES DAILY
Qty: 90 TABLET | Refills: 1 | Status: SHIPPED | OUTPATIENT
Start: 2024-01-12 | End: 2024-01-12

## 2024-01-12 RX ORDER — METHOCARBAMOL 500 MG/1
500 TABLET, FILM COATED ORAL 3 TIMES DAILY PRN
Qty: 90 TABLET | Refills: 1 | Status: SHIPPED | OUTPATIENT
Start: 2024-01-12

## 2024-01-12 RX ORDER — GABAPENTIN 300 MG/1
600 CAPSULE ORAL
Qty: 90 CAPSULE | Refills: 1 | Status: SHIPPED | OUTPATIENT
Start: 2024-01-12 | End: 2024-04-11

## 2024-01-12 RX ORDER — TOPIRAMATE 25 MG/1
TABLET ORAL
Qty: 90 TABLET | Refills: 5 | Status: SHIPPED | OUTPATIENT
Start: 2024-01-12

## 2024-01-12 RX ORDER — METOPROLOL TARTRATE 50 MG/1
TABLET, FILM COATED ORAL
Qty: 180 TABLET | Refills: 3 | Status: SHIPPED | OUTPATIENT
Start: 2024-01-12

## 2024-01-12 ASSESSMENT — PATIENT HEALTH QUESTIONNAIRE - PHQ9
1. LITTLE INTEREST OR PLEASURE IN DOING THINGS: 0
4. FEELING TIRED OR HAVING LITTLE ENERGY: 0
7. TROUBLE CONCENTRATING ON THINGS, SUCH AS READING THE NEWSPAPER OR WATCHING TELEVISION: 0
6. FEELING BAD ABOUT YOURSELF - OR THAT YOU ARE A FAILURE OR HAVE LET YOURSELF OR YOUR FAMILY DOWN: 0
9. THOUGHTS THAT YOU WOULD BE BETTER OFF DEAD, OR OF HURTING YOURSELF: 0
3. TROUBLE FALLING OR STAYING ASLEEP: 0
SUM OF ALL RESPONSES TO PHQ9 QUESTIONS 1 & 2: 0
SUM OF ALL RESPONSES TO PHQ QUESTIONS 1-9: 0
10. IF YOU CHECKED OFF ANY PROBLEMS, HOW DIFFICULT HAVE THESE PROBLEMS MADE IT FOR YOU TO DO YOUR WORK, TAKE CARE OF THINGS AT HOME, OR GET ALONG WITH OTHER PEOPLE: 0
SUM OF ALL RESPONSES TO PHQ QUESTIONS 1-9: 0
2. FEELING DOWN, DEPRESSED OR HOPELESS: 0
SUM OF ALL RESPONSES TO PHQ QUESTIONS 1-9: 0
8. MOVING OR SPEAKING SO SLOWLY THAT OTHER PEOPLE COULD HAVE NOTICED. OR THE OPPOSITE, BEING SO FIGETY OR RESTLESS THAT YOU HAVE BEEN MOVING AROUND A LOT MORE THAN USUAL: 0
5. POOR APPETITE OR OVEREATING: 0
SUM OF ALL RESPONSES TO PHQ QUESTIONS 1-9: 0

## 2024-01-12 ASSESSMENT — ENCOUNTER SYMPTOMS
DIARRHEA: 0
ABDOMINAL PAIN: 0
VOICE CHANGE: 0
PHOTOPHOBIA: 0
WHEEZING: 0
VOMITING: 0
NAUSEA: 0
SHORTNESS OF BREATH: 0
TROUBLE SWALLOWING: 0

## 2024-01-12 NOTE — PROGRESS NOTES
Nicotine dependence, cigarettes, uncomplicated 3/27/2017    Pancreatitis     Panlobular emphysema (HCC) 3/29/2016    Partial epilepsy with impairment of consciousness ([facility]) 09/16/2019    PT STATES LAST SEIZURE WAS ABOUT 3 YEARS AGO    Partial epilepsy with impairment of consciousness, intractable (HCC) 9/9/2013    Primary osteoarthritis of left hip 9/14/2017    Prostatic hypertrophy, benign     Recurrent inguinal hernia 4/21/2015    Recurrent left inguinal hernia     Restless leg syndrome 10/6/2015    S/P lumbar fusion 7/23/2014    Sacroiliac dysfunction 7/23/2014    Sacroiliac inflammation (HCC) 6/6/2013    Sciatica 11/20/2013    Seizure ([facility]) 5/4/2015    Seizure disorder 4/30/2013    SI (sacroiliac) pain 12/13/2012    Syncope     Tobacco abuse disorder 2/21/2012    Trauma and stressor-related disorder 7/10/2017    Undescended left testicle 10/6/2015    Unspecified cerebral artery occlusion with cerebral infarction     Weight loss, abnormal      Past Surgical History:   Procedure Laterality Date    ANKLE SURGERY      left    BACK SURGERY      X2 RODS AND SCREWS    COLONOSCOPY  05/26/2020    Multiple polyps, diverticulosis, internal hemorrhoids-Next colonoscopy in 2023. Dr Bradshaw    HAND SURGERY Bilateral     CARTILAGE REPLACED IN WRIST    HERNIA REPAIR Left 05/14/2015    Inguinal, with mesh    HERNIA REPAIR Left 9/18/2019    OPEN LEFT INGUINAL HERNIA REPAIR WITH MESH performed by Carlos A Matos MD at [facility]    INGUINAL HERNIA REPAIR Left 10/25/2016    inguinal hernia repair with mesh    OTHER SURGICAL HISTORY  11/10/2016    INCISION AND DRAINAGE OF LEFT GROIN HEMATOMA    SPINE SURGERY      lower back    UPPER GASTROINTESTINAL ENDOSCOPY  05/26/2020    7 cm sliding hernia     Social History     Socioeconomic History    Marital status:      Spouse name: None    Number of children: 4    Years of education: None    Highest education level: None   Occupational History    Occupation: retired/disabled

## 2024-01-13 LAB
ALBUMIN SERPL-MCNC: 4.8 G/DL (ref 3.4–5)
ALP SERPL-CCNC: 123 U/L (ref 40–129)
ALT SERPL-CCNC: 37 U/L (ref 10–40)
ANION GAP SERPL CALCULATED.3IONS-SCNC: 15 MMOL/L (ref 3–16)
AST SERPL-CCNC: 51 U/L (ref 15–37)
BILIRUB DIRECT SERPL-MCNC: <0.2 MG/DL (ref 0–0.3)
BILIRUB INDIRECT SERPL-MCNC: ABNORMAL MG/DL (ref 0–1)
BILIRUB SERPL-MCNC: 0.7 MG/DL (ref 0–1)
BUN SERPL-MCNC: 5 MG/DL (ref 7–20)
CALCIUM SERPL-MCNC: 9.4 MG/DL (ref 8.3–10.6)
CHLORIDE SERPL-SCNC: 94 MMOL/L (ref 99–110)
CHOLEST SERPL-MCNC: 134 MG/DL (ref 0–199)
CO2 SERPL-SCNC: 22 MMOL/L (ref 21–32)
CREAT SERPL-MCNC: 0.6 MG/DL (ref 0.8–1.3)
GFR SERPLBLD CREATININE-BSD FMLA CKD-EPI: >60 ML/MIN/{1.73_M2}
GLUCOSE SERPL-MCNC: 95 MG/DL (ref 70–99)
HDLC SERPL-MCNC: 95 MG/DL (ref 40–60)
LDL CHOLESTEROL CALCULATED: 31 MG/DL
POTASSIUM SERPL-SCNC: 4.5 MMOL/L (ref 3.5–5.1)
PROT SERPL-MCNC: 6.7 G/DL (ref 6.4–8.2)
PSA SERPL DL<=0.01 NG/ML-MCNC: 0.39 NG/ML (ref 0–4)
SODIUM SERPL-SCNC: 131 MMOL/L (ref 136–145)
TRIGL SERPL-MCNC: 42 MG/DL (ref 0–150)
VLDLC SERPL CALC-MCNC: 8 MG/DL

## 2024-01-14 NOTE — RESULT ENCOUNTER NOTE
Minor lab changes  with slightly low sodium.  Please advise patient to avoid alcohol.  No other change of medicine

## 2024-02-20 ENCOUNTER — CLINICAL DOCUMENTATION (OUTPATIENT)
Dept: SPIRITUAL SERVICES | Age: 69
End: 2024-02-20

## 2024-02-20 NOTE — ACP (ADVANCE CARE PLANNING)
Advance Care Planning     Advance Care Planning Clinical Specialist  Conversation Note      Date of ACP Conversation: 2/20/2024    Conversation Conducted with: Patient with Decision Making Capacity    ACP Clinical Specialist: VALENTINA Valdez  Healthcare Decision Maker:     Current Designated Healthcare Decision Maker:     Primary Decision Maker: Anju Osborn - Spouse - [phone]  Click here to complete Healthcare Decision Makers including section of the Healthcare Decision Maker Relationship (ie \"Primary\")  Today we completed the ACP conversation, nomination of HCDM's and completed pt's AD documents.     Care Preferences    Hospitalization:  \"If your health worsens and it becomes clear that your chance of recovery is unlikely, what would your preference be regarding hospitalization?\"    Choice:  [] The patient wants hospitalization.  [x] The patient prefers comfort-focused treatment without hospitalization.    Ventilation:  \"If you were in your present state of health and suddenly became very ill and were unable to breathe on your own, what would your preference be about the use of a ventilator (breathing machine) if it were available to you?\"      If the patient would desire the use of ventilator (breathing machine), answer \"yes\".  If not, \"no\": no    \"If your health worsens and it becomes clear that your chance of recovery is unlikely, what would your preference be about the use of a ventilator (breathing machine) if it were available to you?\"     Would the patient desire the use of ventilator (breathing machine)?: No      Resuscitation  \"CPR works best to restart the heart when there is a sudden event, like a heart attack, in someone who is otherwise healthy. Unfortunately, CPR does not typically restart the heart for people who have serious health conditions or who are very sick.\"    \"In the event your heart stopped as a result of an underlying serious health condition, would you want attempts to be made

## 2024-04-19 DIAGNOSIS — R25.2 LEG CRAMP: ICD-10-CM

## 2024-04-19 RX ORDER — ROPINIROLE 0.5 MG/1
TABLET, FILM COATED ORAL
Qty: 60 TABLET | Refills: 0 | Status: SHIPPED | OUTPATIENT
Start: 2024-04-19

## 2024-05-30 ENCOUNTER — TELEPHONE (OUTPATIENT)
Dept: INTERNAL MEDICINE CLINIC | Age: 69
End: 2024-05-30

## 2024-06-20 ENCOUNTER — TELEPHONE (OUTPATIENT)
Dept: INTERNAL MEDICINE CLINIC | Age: 69
End: 2024-06-20

## 2024-06-20 NOTE — TELEPHONE ENCOUNTER
[facility] [id number]. Should hear back in 24 hours.

## 2024-06-21 ENCOUNTER — CARE COORDINATION (OUTPATIENT)
Dept: CARE COORDINATION | Age: 69
End: 2024-06-21

## 2024-06-21 SDOH — ECONOMIC STABILITY: FOOD INSECURITY: WITHIN THE PAST 12 MONTHS, YOU WORRIED THAT YOUR FOOD WOULD RUN OUT BEFORE YOU GOT MONEY TO BUY MORE.: NEVER TRUE

## 2024-06-21 SDOH — ECONOMIC STABILITY: FOOD INSECURITY: WITHIN THE PAST 12 MONTHS, THE FOOD YOU BOUGHT JUST DIDN'T LAST AND YOU DIDN'T HAVE MONEY TO GET MORE.: NEVER TRUE

## 2024-06-21 SDOH — ECONOMIC STABILITY: INCOME INSECURITY: IN THE LAST 12 MONTHS, WAS THERE A TIME WHEN YOU WERE NOT ABLE TO PAY THE MORTGAGE OR RENT ON TIME?: NO

## 2024-06-21 SDOH — ECONOMIC STABILITY: HOUSING INSECURITY: IN THE LAST 12 MONTHS, HOW MANY PLACES HAVE YOU LIVED?: 1

## 2024-06-21 NOTE — CARE COORDINATION
Ambulatory Care Coordination Note  2024    Patient Current Location:  [place]    ACM contacted the patient by telephone. Verified name and  with patient as identifiers. Provided introduction to self, and explanation of the ACM role.     ACM: Danielle Drew RN    Challenges to be reviewed by the provider   Additional needs identified to be addressed with provider: No  none               Method of communication with provider: none.    Payor referral to care coordination. ACM outreached patient; introduced self and role of care coordinator.     Patient is a 68 yr old male with pmhx of htn, copd, cervical disc disorder, compression fx. Patient reports hx of multiple back surgeries. Patient resides in single family home with his spouse. Patient ambulates with assist of a walker, states home is handicap equipped. Patient is currently active with [facility]; SN/PT/OT. Patient states his only need at this time is obtaining a power mobility scooter. Patient denies financial concerns any assistance with healthcare needs at this time. Patient aware ACM will route referral for power mobility scooter to Van with [facility], pt nguyen.     Patient is scheduled to see pcp on . Patient aware he will need to discuss need for power mobility scooter at this appointment. ACM will also route message to pcp and update in appointment notes.     Medication review complete; no discrepancies noted.   CC protocol completed    PLAN:    Complete SDOH   F/u on power mobility scooter referral  Route message to pcp    Offered patient enrollment in the Remote Patient Monitoring (RPM) program for in-home monitoring: no discussed during call. Patient states only need at this time is obtaining a power mobility scooter.     Ambulatory Care Coordination Assessment    Care Coordination Protocol  Referral from Primary Care Provider: No  Week 1 - Initial Assessment     Do you have all of your prescriptions and are they filled?: Yes  Barriers to

## 2024-06-21 NOTE — CARE COORDINATION
DOMINGA outreached Van with [facility]  OV notes and supporting documents faxed to [phone]  Van states he will run patients insurance to determine in network coverage and will follow up with DOMINGA

## 2024-06-26 ENCOUNTER — OFFICE VISIT (OUTPATIENT)
Dept: INTERNAL MEDICINE CLINIC | Age: 69
End: 2024-06-26

## 2024-06-26 VITALS
DIASTOLIC BLOOD PRESSURE: 80 MMHG | OXYGEN SATURATION: 99 % | HEART RATE: 64 BPM | TEMPERATURE: 97.7 F | WEIGHT: 110.6 LBS | SYSTOLIC BLOOD PRESSURE: 138 MMHG | BODY MASS INDEX: 18.4 KG/M2

## 2024-06-26 DIAGNOSIS — Z87.81 STATUS POST OPEN REDUCTION AND INTERNAL FIXATION (ORIF) OF FRACTURE: ICD-10-CM

## 2024-06-26 DIAGNOSIS — I25.10 CORONARY ARTERY DISEASE INVOLVING NATIVE HEART WITHOUT ANGINA PECTORIS, UNSPECIFIED VESSEL OR LESION TYPE: ICD-10-CM

## 2024-06-26 DIAGNOSIS — Z86.73 H/O: CVA (CEREBROVASCULAR ACCIDENT): ICD-10-CM

## 2024-06-26 DIAGNOSIS — M62.838 CERVICAL PARASPINAL MUSCLE SPASM: ICD-10-CM

## 2024-06-26 DIAGNOSIS — M79.604 RIGHT LEG PAIN: ICD-10-CM

## 2024-06-26 DIAGNOSIS — R51.9 CHRONIC NONINTRACTABLE HEADACHE, UNSPECIFIED HEADACHE TYPE: ICD-10-CM

## 2024-06-26 DIAGNOSIS — R25.2 LEG CRAMP: ICD-10-CM

## 2024-06-26 DIAGNOSIS — J43.9 PULMONARY EMPHYSEMA, UNSPECIFIED EMPHYSEMA TYPE (HCC): ICD-10-CM

## 2024-06-26 DIAGNOSIS — G89.29 CHRONIC MIDLINE LOW BACK PAIN WITHOUT SCIATICA: ICD-10-CM

## 2024-06-26 DIAGNOSIS — Z76.89 ENCOUNTER FOR POWER MOBILITY DEVICE ASSESSMENT: ICD-10-CM

## 2024-06-26 DIAGNOSIS — M50.121 CERVICAL DISC DISORDER AT C4-C5 LEVEL WITH RADICULOPATHY: ICD-10-CM

## 2024-06-26 DIAGNOSIS — G89.29 CHRONIC NONINTRACTABLE HEADACHE, UNSPECIFIED HEADACHE TYPE: ICD-10-CM

## 2024-06-26 DIAGNOSIS — M54.50 CHRONIC MIDLINE LOW BACK PAIN WITHOUT SCIATICA: ICD-10-CM

## 2024-06-26 DIAGNOSIS — Z98.890 STATUS POST OPEN REDUCTION AND INTERNAL FIXATION (ORIF) OF FRACTURE: ICD-10-CM

## 2024-06-26 DIAGNOSIS — M47.812 SPONDYLOSIS OF CERVICAL REGION WITHOUT MYELOPATHY OR RADICULOPATHY: ICD-10-CM

## 2024-06-26 DIAGNOSIS — Z09 HOSPITAL DISCHARGE FOLLOW-UP: ICD-10-CM

## 2024-06-26 DIAGNOSIS — G89.29 CHRONIC LOW BACK PAIN WITH SCIATICA, SCIATICA LATERALITY UNSPECIFIED, UNSPECIFIED BACK PAIN LATERALITY: ICD-10-CM

## 2024-06-26 DIAGNOSIS — M54.40 CHRONIC LOW BACK PAIN WITH SCIATICA, SCIATICA LATERALITY UNSPECIFIED, UNSPECIFIED BACK PAIN LATERALITY: ICD-10-CM

## 2024-06-26 DIAGNOSIS — I20.9 ANGINA PECTORIS, UNSPECIFIED (HCC): ICD-10-CM

## 2024-06-26 DIAGNOSIS — D69.6 THROMBOCYTOPENIA, UNSPECIFIED (HCC): ICD-10-CM

## 2024-06-26 DIAGNOSIS — E78.5 DYSLIPIDEMIA: ICD-10-CM

## 2024-06-26 DIAGNOSIS — I10 ESSENTIAL HYPERTENSION: ICD-10-CM

## 2024-06-26 DIAGNOSIS — S22.070A CLOSED WEDGE COMPRESSION FRACTURE OF T10 VERTEBRA, INITIAL ENCOUNTER (HCC): Primary | ICD-10-CM

## 2024-06-26 PROBLEM — I25.119 ATHEROSCLEROTIC HEART DISEASE OF NATIVE CORONARY ARTERY WITH UNSPECIFIED ANGINA PECTORIS (HCC): Status: ACTIVE | Noted: 2024-06-26

## 2024-06-26 PROBLEM — G20.A1 PARKINSON'S DISEASE (HCC): Status: ACTIVE | Noted: 2024-06-26

## 2024-06-26 RX ORDER — LOSARTAN POTASSIUM 100 MG/1
TABLET ORAL
Qty: 90 TABLET | Refills: 1 | Status: SHIPPED | OUTPATIENT
Start: 2024-06-26

## 2024-06-26 RX ORDER — B-COMPLEX WITH VITAMIN C
2 TABLET ORAL 2 TIMES DAILY
Qty: 360 TABLET | Refills: 1 | Status: SHIPPED | OUTPATIENT
Start: 2024-06-26 | End: 2024-12-23

## 2024-06-26 RX ORDER — TOPIRAMATE 25 MG/1
TABLET ORAL
Qty: 90 TABLET | Refills: 5 | Status: SHIPPED | OUTPATIENT
Start: 2024-06-26

## 2024-06-26 RX ORDER — ROPINIROLE 0.5 MG/1
TABLET, FILM COATED ORAL
Qty: 60 TABLET | Refills: 0 | Status: SHIPPED | OUTPATIENT
Start: 2024-06-26

## 2024-06-26 RX ORDER — ATORVASTATIN CALCIUM 20 MG/1
20 TABLET, FILM COATED ORAL DAILY
Qty: 90 TABLET | Refills: 1 | Status: SHIPPED | OUTPATIENT
Start: 2024-06-26

## 2024-06-26 RX ORDER — GABAPENTIN 300 MG/1
600 CAPSULE ORAL
Qty: 90 CAPSULE | Refills: 1 | Status: SHIPPED | OUTPATIENT
Start: 2024-06-26 | End: 2024-09-24

## 2024-06-26 RX ORDER — ISOSORBIDE MONONITRATE 30 MG/1
30 TABLET, EXTENDED RELEASE ORAL DAILY
Qty: 90 TABLET | Refills: 1 | Status: SHIPPED | OUTPATIENT
Start: 2024-06-26

## 2024-06-26 RX ORDER — METOPROLOL TARTRATE 50 MG/1
TABLET, FILM COATED ORAL
Qty: 180 TABLET | Refills: 3 | Status: SHIPPED | OUTPATIENT
Start: 2024-06-26

## 2024-06-26 RX ORDER — AMLODIPINE BESYLATE 10 MG/1
10 TABLET ORAL DAILY
Qty: 90 TABLET | Refills: 1 | Status: SHIPPED | OUTPATIENT
Start: 2024-06-26

## 2024-06-26 RX ORDER — FLUTICASONE PROPIONATE AND SALMETEROL 250; 50 UG/1; UG/1
1 POWDER RESPIRATORY (INHALATION) EVERY 12 HOURS
Qty: 60 EACH | Refills: 3 | Status: SHIPPED | OUTPATIENT
Start: 2024-06-26

## 2024-06-26 RX ORDER — ASPIRIN 81 MG/1
81 TABLET ORAL DAILY
Qty: 90 TABLET | Refills: 1 | Status: SHIPPED | OUTPATIENT
Start: 2024-06-26

## 2024-06-26 SDOH — ECONOMIC STABILITY: FOOD INSECURITY: WITHIN THE PAST 12 MONTHS, YOU WORRIED THAT YOUR FOOD WOULD RUN OUT BEFORE YOU GOT MONEY TO BUY MORE.: NEVER TRUE

## 2024-06-26 SDOH — ECONOMIC STABILITY: FOOD INSECURITY: WITHIN THE PAST 12 MONTHS, THE FOOD YOU BOUGHT JUST DIDN'T LAST AND YOU DIDN'T HAVE MONEY TO GET MORE.: NEVER TRUE

## 2024-06-26 SDOH — ECONOMIC STABILITY: INCOME INSECURITY: HOW HARD IS IT FOR YOU TO PAY FOR THE VERY BASICS LIKE FOOD, HOUSING, MEDICAL CARE, AND HEATING?: NOT HARD AT ALL

## 2024-06-26 NOTE — PROGRESS NOTES
-Discharge Transitional Care  Follow Up      Nicola Osborn   YOB: 1955    Date of Office Visit:  6/26/2024  Date of Hospital Admission: 9/15/22  Date of Hospital Discharge: 9/18/22  Risk of hospital readmission (high >=14%. Medium >=10%) :No data recorded    Care management risk score Rising risk (score 2-5) and Complex Care (Scores >=6): No Risk Score On File   The patient was in [facility] from [facility] to June 11.  He had kyphoplasty for T10 fracture  History of left femur fracture  History of other T-spine compression fracture  Since hospital discharge he went to a rehab facility.  He underwent physical therapy occupational therapy and then released back to home  Although patient declined no alcohol use, patient wife report he has started drinking beer again  They are concerned he might need a back brace.  Apparently he has been evaluated by a spine specialist in [facility].  I advised patient and wife to contact them whether he would benefit from back brace    Patient needs refill of multiple medications.  History of frequent falls.  Multiple fractures  He has been using cane, walker, requesting motorized scooter  He is in the process of getting Prolia    ASSESSMENT/PLAN:   Closed wedge compression fracture of T10 vertebra, initial encounter (HCC)  -     [facility] Order for (Specify) as OP  -     Ambulatory Referral to Care Management  -     denosumab (PROLIA) SC injection 60 mg; 60 mg, SubCUTAneous, ONCE, 1 dose, On Wed 6/26/24 at 0945  Essential hypertension  Well-controlled.  urrently  -     amLODIPine (NORVASC) 10 MG tablet; Take 1 tablet by mouth daily, Disp-90 tablet, R-1Normal  -     losartan (COZAAR) 100 MG tablet; TAKE ONE TABLET BY MOUTH DAILY, Disp-90 tablet, R-1Normal  -     metoprolol tartrate (LOPRESSOR) 50 MG tablet; TAKE ONE TABLET BY MOUTH EVERY MORNING AND TAKE ONE TABLET BY MOUTH EVERY NIGHT AT BEDTIME, Disp-180 tablet, R-3Normal  -     Ambulatory Referral to

## 2024-07-17 ENCOUNTER — CARE COORDINATION (OUTPATIENT)
Dept: CARE COORDINATION | Age: 69
End: 2024-07-17

## 2024-07-17 NOTE — CARE COORDINATION
Ambulatory Care Coordination Note     7/17/2024 9:00 AM     Patient Current Location:  [place]     Patient closed (goals met) from the High Risk Care Management program on 7/17/2024.  Patient verbalizes confidence in the ability to self-manage at this time. has the ability to self manage at this time..  Care management goals have been completed. No further Ambulatory Care Manager follow up scheduled.      ACM: Danielle Drew RN     Challenges to be reviewed by the provider   Additional needs identified to be addressed with provider No  none               Method of communication with provider: none.    Care Summary Note:     ACM outreached patient for cc follow up; mobility scooter.     Referral placed to [facility]. Patient received power mobility scooter, states it is working out well for him.  Patient denies further care coordination needs. ACM will proceed with discharge. Patient encouraged to contact ACM at [phone] or [facility] if future care coordination needs change, pt nguyen.    Offered patient enrollment in the Remote Patient Monitoring (RPM) program for in-home monitoring: discharged from care coordination.       PCP/Specialist follow up:   Future Appointments         Provider Specialty Dept Phone    7/25/2024 11:20 AM ALEJA June MD Internal Medicine [phone]            Follow Up:   N/A - discharged, goals met

## 2024-07-25 ENCOUNTER — OFFICE VISIT (OUTPATIENT)
Dept: INTERNAL MEDICINE CLINIC | Age: 69
End: 2024-07-25

## 2024-07-25 VITALS
BODY MASS INDEX: 18.11 KG/M2 | WEIGHT: 108.8 LBS | TEMPERATURE: 97.9 F | HEART RATE: 68 BPM | DIASTOLIC BLOOD PRESSURE: 60 MMHG | SYSTOLIC BLOOD PRESSURE: 112 MMHG | OXYGEN SATURATION: 98 %

## 2024-07-25 DIAGNOSIS — M54.50 CHRONIC MIDLINE LOW BACK PAIN WITHOUT SCIATICA: ICD-10-CM

## 2024-07-25 DIAGNOSIS — M47.812 SPONDYLOSIS OF CERVICAL REGION WITHOUT MYELOPATHY OR RADICULOPATHY: ICD-10-CM

## 2024-07-25 DIAGNOSIS — G89.29 CHRONIC NONINTRACTABLE HEADACHE, UNSPECIFIED HEADACHE TYPE: ICD-10-CM

## 2024-07-25 DIAGNOSIS — R25.2 LEG CRAMP: ICD-10-CM

## 2024-07-25 DIAGNOSIS — I10 ESSENTIAL HYPERTENSION: Primary | ICD-10-CM

## 2024-07-25 DIAGNOSIS — Z86.010 PERSONAL HISTORY OF COLONIC POLYPS: ICD-10-CM

## 2024-07-25 DIAGNOSIS — R51.9 CHRONIC NONINTRACTABLE HEADACHE, UNSPECIFIED HEADACHE TYPE: ICD-10-CM

## 2024-07-25 DIAGNOSIS — G89.29 CHRONIC MIDLINE LOW BACK PAIN WITHOUT SCIATICA: ICD-10-CM

## 2024-07-25 DIAGNOSIS — Z12.11 SCREENING FOR COLON CANCER: ICD-10-CM

## 2024-07-25 DIAGNOSIS — Z87.891 PERSONAL HISTORY OF TOBACCO USE: ICD-10-CM

## 2024-07-25 PROBLEM — G20.A1 PARKINSON'S DISEASE (HCC): Status: RESOLVED | Noted: 2024-06-26 | Resolved: 2024-07-25

## 2024-07-25 RX ORDER — TOPIRAMATE 25 MG/1
TABLET ORAL
Qty: 90 TABLET | Refills: 5 | Status: SHIPPED | OUTPATIENT
Start: 2024-07-25

## 2024-07-25 RX ORDER — ROPINIROLE 0.5 MG/1
0.5 TABLET, FILM COATED ORAL
Qty: 90 TABLET | Refills: 1 | Status: SHIPPED | OUTPATIENT
Start: 2024-07-25

## 2024-07-25 ASSESSMENT — ENCOUNTER SYMPTOMS
ABDOMINAL PAIN: 0
PHOTOPHOBIA: 0
VOICE CHANGE: 0
NAUSEA: 0
SHORTNESS OF BREATH: 0
TROUBLE SWALLOWING: 0
VOMITING: 0
WHEEZING: 0
DIARRHEA: 0

## 2024-07-25 NOTE — PROGRESS NOTES
Discussed with the patient the current USPSTF guidelines released March 9, 2021 for screening for lung cancer.    For adults aged 50 to 80 years who have a 20 pack-year smoking history and currently smoke or have quit within the past 15 years the grade B recommendation is to:  Screen for lung cancer with low-dose computed tomography (LDCT) every year.  Stop screening once a person has not smoked for 15 years or has a health problem that limits life expectancy or the ability to have lung surgery.    The patient  reports that he has been smoking cigarettes. He started smoking about 55 years ago. He has a 55.6 pack-year smoking history. He has been exposed to tobacco smoke. He has quit using smokeless tobacco.. Discussed with patient the risks and benefits of screening, including over-diagnosis, false positive rate, and total radiation exposure.  The patient currently exhibits no signs or symptoms suggestive of lung cancer.  Discussed with patient the importance of compliance with yearly annual lung cancer screenings and willingness to undergo diagnosis and treatment if screening scan is positive.  In addition, the patient was counseled regarding the importance of remaining smoke free and/or total smoking cessation.    Also reviewed the following if the patient has Medicare that as of February 10, 2022, Medicare only covers LDCT screening in patients aged 50-77 with at least a 20 pack-year smoking history who currently smoke or have quit in the last 15 years  
     Conjunctiva/sclera: Conjunctivae normal.   Neck:      Vascular: No carotid bruit.   Cardiovascular:      Rate and Rhythm: Normal rate and regular rhythm.      Pulses: Normal pulses.      Heart sounds: Normal heart sounds.   Pulmonary:      Effort: Pulmonary effort is normal.      Breath sounds: No wheezing or rhonchi.   Abdominal:      General: Bowel sounds are normal.   Musculoskeletal:      Comments: Patient is using cane   Neurological:      General: No focal deficit present.      Mental Status: He is alert and oriented to person, place, and time. Mental status is at baseline.      Coordination: Coordination normal.      Gait: Gait normal.   Psychiatric:         Mood and Affect: Mood normal.         Behavior: Behavior normal.         CBC:   Lab Results   Component Value Date/Time    WBC 9.5 06/05/2024 04:16 AM    HGB 12.8 06/05/2024 04:16 AM    HCT 36.9 06/05/2024 04:16 AM     06/05/2024 04:16 AM     CMP:  Lab Results   Component Value Date/Time     06/05/2024 04:16 AM    K 3.4 06/05/2024 04:16 AM    K 3.8 09/17/2022 12:06 AM    CL 95 06/05/2024 04:16 AM    CO2 25 06/05/2024 04:16 AM    ANIONGAP 7 06/05/2024 04:16 AM    GLUCOSE 99 06/05/2024 04:16 AM    GLUCOSE 82 12/03/2021 08:44 AM    BUN 17 06/05/2024 04:16 AM    CREATININE 0.75 06/05/2024 04:16 AM    GFRAA >60 09/26/2022 10:04 AM    GFRAA >60 10/08/2012 02:10 PM    CALCIUM 8.8 06/05/2024 04:16 AM    AGRATIO 1.9 06/03/2024 12:31 PM    BILITOT 2.0 06/03/2024 12:31 PM    ALKPHOS 123 06/03/2024 12:31 PM    ALT 40 06/03/2024 12:31 PM    AST 42 06/03/2024 12:31 PM    GLOB 2.5 06/03/2024 12:31 PM     URINALYSIS:  Lab Results   Component Value Date/Time    GLUCOSEU Normal 06/04/2024 06:50 AM    KETUA 10 06/04/2024 06:50 AM    SPECGRAV 1.020 04/30/2013 11:52 AM    BLOODU Negative 06/04/2024 06:50 AM    PHUR 6.0 06/04/2024 06:50 AM    PHUR 5.5 09/15/2022 09:34 PM    PROTEINU 30 06/04/2024 06:50 AM    NITRU Negative 06/04/2024 06:50 AM    LEUKOCYTESUR

## 2024-08-21 ENCOUNTER — TELEPHONE (OUTPATIENT)
Dept: INTERNAL MEDICINE CLINIC | Age: 69
End: 2024-08-21

## 2024-08-21 NOTE — TELEPHONE ENCOUNTER
Christine from [facility] calling ---pt broke left hip and was in [facility] released 8/8 ---pt having severe pain asking for pain medicine or referral to pain management--please call her at [phone].   
HFU scheduled for 8/22 with .  
clear

## 2024-08-22 ENCOUNTER — OFFICE VISIT (OUTPATIENT)
Dept: INTERNAL MEDICINE CLINIC | Age: 69
End: 2024-08-22

## 2024-08-22 VITALS
WEIGHT: 103.2 LBS | SYSTOLIC BLOOD PRESSURE: 124 MMHG | DIASTOLIC BLOOD PRESSURE: 74 MMHG | HEIGHT: 65 IN | HEART RATE: 62 BPM | BODY MASS INDEX: 17.19 KG/M2

## 2024-08-22 DIAGNOSIS — E87.1 HYPONATREMIA: ICD-10-CM

## 2024-08-22 DIAGNOSIS — F10.90 ALCOHOL USE DISORDER: ICD-10-CM

## 2024-08-22 DIAGNOSIS — Z09 HOSPITAL DISCHARGE FOLLOW-UP: Primary | ICD-10-CM

## 2024-08-22 DIAGNOSIS — S22.081A T12 BURST FRACTURE (HCC): ICD-10-CM

## 2024-08-22 DIAGNOSIS — S72.002D CLOSED FRACTURE OF LEFT HIP WITH ROUTINE HEALING, SUBSEQUENT ENCOUNTER: ICD-10-CM

## 2024-08-22 LAB
ANION GAP SERPL CALCULATED.3IONS-SCNC: 13 MMOL/L (ref 3–16)
BUN SERPL-MCNC: 4 MG/DL (ref 7–20)
CALCIUM SERPL-MCNC: 8.7 MG/DL (ref 8.3–10.6)
CHLORIDE SERPL-SCNC: 89 MMOL/L (ref 99–110)
CO2 SERPL-SCNC: 23 MMOL/L (ref 21–32)
CREAT SERPL-MCNC: 0.7 MG/DL (ref 0.8–1.3)
GFR SERPLBLD CREATININE-BSD FMLA CKD-EPI: >90 ML/MIN/{1.73_M2}
GLUCOSE SERPL-MCNC: 61 MG/DL (ref 70–99)
MAGNESIUM SERPL-MCNC: 1.8 MG/DL (ref 1.8–2.4)
POTASSIUM SERPL-SCNC: 4.2 MMOL/L (ref 3.5–5.1)
SODIUM SERPL-SCNC: 125 MMOL/L (ref 136–145)

## 2024-08-22 NOTE — PATIENT INSTRUCTIONS
Call to make appointment    [facility]: [phone]    [facility]  [phone]    [facility]  [phone]

## 2024-08-22 NOTE — PROGRESS NOTES
Post-Discharge Transitional Care  Follow Up      Nicola Osborn   YOB: 1955    Date of Office Visit:  8/22/2024  Date of Hospital Admission: 08/06/2024  Date of Hospital Discharge: 08/15/2024  Risk of hospital readmission (high >=14%. Medium >=10%) :No data recorded    Care management risk score Rising risk (score 2-5) and Complex Care (Scores >=6): No Risk Score On File     Non face to face  following discharge, date last encounter closed (first attempt may have been earlier): *No documented post hospital discharge outreach found in the last 14 days    Call initiated 2 business days of discharge: *No response recorded in the last 14 days    ASSESSMENT/PLAN:   Hospital discharge follow-up  Patient was recently admitted to the hospital following a fall.  He sustained a greater trochanter fracture of the left hip.  He has outpatient appointment with an orthopedics in [facility].  He is now getting his skilled nursing visit periodically at home.  He continued to complain of pain on weightbearing.  -     LA DISCHARGE MEDS RECONCILED W/ CURRENT OUTPATIENT MED LIST  -     Ambulatory Referral to Care Management  Closed fracture of left hip with routine healing, subsequent encounter  -     denosumab (PROLIA) SC injection 60 mg; 60 mg, SubCUTAneous, ONCE, 1 dose, On Thu 8/22/24 at 1445  Hyponatremia  Most likely secondary to alcohol abuse.  He continued to drink alcohol about 2 beer a day.  I again advised him to go for substance abuse rehab program.  Information will be given  -     Basic Metabolic Panel; Future  -     Magnesium; Future  Alcohol use disorder  T12 burst fracture (HCC)  History of chronic back pain.  No worsening from baseline.  Currently on Robaxin and gabapentin   patient also have history of seizure disorder no breakthrough seizure  -     denosumab (PROLIA) SC injection 60 mg; 60 mg, SubCUTAneous, ONCE, 1 dose, On Thu 8/22/24 at 1430    Medical Decision Making: high

## 2024-08-23 DIAGNOSIS — M54.50 CHRONIC MIDLINE LOW BACK PAIN WITHOUT SCIATICA: ICD-10-CM

## 2024-08-23 DIAGNOSIS — M79.604 RIGHT LEG PAIN: ICD-10-CM

## 2024-08-23 DIAGNOSIS — M50.121 CERVICAL DISC DISORDER AT C4-C5 LEVEL WITH RADICULOPATHY: ICD-10-CM

## 2024-08-23 DIAGNOSIS — M62.838 CERVICAL PARASPINAL MUSCLE SPASM: ICD-10-CM

## 2024-08-23 DIAGNOSIS — E87.1 HYPONATREMIA: Primary | ICD-10-CM

## 2024-08-23 DIAGNOSIS — M47.812 SPONDYLOSIS OF CERVICAL REGION WITHOUT MYELOPATHY OR RADICULOPATHY: ICD-10-CM

## 2024-08-23 DIAGNOSIS — Z98.890 STATUS POST OPEN REDUCTION AND INTERNAL FIXATION (ORIF) OF FRACTURE: ICD-10-CM

## 2024-08-23 DIAGNOSIS — G89.29 CHRONIC MIDLINE LOW BACK PAIN WITHOUT SCIATICA: ICD-10-CM

## 2024-08-23 DIAGNOSIS — Z87.81 STATUS POST OPEN REDUCTION AND INTERNAL FIXATION (ORIF) OF FRACTURE: ICD-10-CM

## 2024-08-23 RX ORDER — GABAPENTIN 300 MG/1
300 CAPSULE ORAL
Qty: 90 CAPSULE | Refills: 0 | Status: SHIPPED
Start: 2024-08-23 | End: 2024-11-21

## 2024-08-23 RX ORDER — SODIUM CHLORIDE 1 G/1
1 TABLET ORAL 2 TIMES DAILY
Qty: 60 TABLET | Refills: 0 | Status: SHIPPED | OUTPATIENT
Start: 2024-08-23

## 2024-08-23 NOTE — RESULT ENCOUNTER NOTE
Patient continues to have low sodium.  He need to stop drinking alcohol.  Sodium tablets is added.  Please give him referral to substance abuse clinic like [facility].  We have to take him off from gabapentin slowly.  Reduce gabapentin to 300 mg at night until next visit

## 2024-08-26 ENCOUNTER — CARE COORDINATION (OUTPATIENT)
Dept: CARE COORDINATION | Age: 69
End: 2024-08-26

## 2024-08-26 NOTE — CARE COORDINATION
Ambulatory Care Coordination Note     8/26/2024 12:37 PM     Patient outreach attempt by this ACM today to offer care management services. ACM was unable to reach the patient by telephone today;  VM not setup     ACM: Danielle Drew RN     Care Summary Note:     Attempt #1    PCP/Specialist follow up:   Future Appointments         Provider Specialty Dept Phone    9/30/2024 9:40 AM ALEJA June MD Internal Medicine [phone]            Follow Up:   Plan for next ACM outreach in approximately 1 week to complete:  - outreach attempt to offer care management services.

## 2024-09-05 ENCOUNTER — CARE COORDINATION (OUTPATIENT)
Dept: CARE COORDINATION | Age: 69
End: 2024-09-05

## 2024-09-05 NOTE — CARE COORDINATION
Ambulatory Care Coordination Note     9/5/2024 2:53 PM     Patient outreach attempt by this ACM today to offer care management services. ACM was unable to reach the patient by telephone today; left voice message requesting a return phone call to this ACM.     ACM: Danielle Drew RN     Care Summary Note:     Attempt #2    PCP/Specialist follow up:   Future Appointments         Provider Specialty Dept Phone    9/30/2024 9:40 AM ALEJA June MD Internal Medicine [phone]            Follow Up:   Plan for next ACM outreach in approximately 1 week to complete:  - outreach attempt to offer care management services.

## 2024-09-13 ENCOUNTER — CARE COORDINATION (OUTPATIENT)
Dept: CARE COORDINATION | Age: 69
End: 2024-09-13

## 2024-09-30 ENCOUNTER — OFFICE VISIT (OUTPATIENT)
Dept: INTERNAL MEDICINE CLINIC | Age: 69
End: 2024-09-30
Payer: MEDICARE

## 2024-09-30 VITALS
SYSTOLIC BLOOD PRESSURE: 126 MMHG | DIASTOLIC BLOOD PRESSURE: 74 MMHG | HEART RATE: 78 BPM | OXYGEN SATURATION: 97 % | WEIGHT: 104 LBS | HEIGHT: 65 IN | BODY MASS INDEX: 17.33 KG/M2

## 2024-09-30 DIAGNOSIS — J43.9 PULMONARY EMPHYSEMA, UNSPECIFIED EMPHYSEMA TYPE (HCC): ICD-10-CM

## 2024-09-30 DIAGNOSIS — Z87.891 PERSONAL HISTORY OF TOBACCO USE: ICD-10-CM

## 2024-09-30 DIAGNOSIS — F10.90 ALCOHOL USE DISORDER: ICD-10-CM

## 2024-09-30 DIAGNOSIS — G25.81 RESTLESS LEG SYNDROME: ICD-10-CM

## 2024-09-30 DIAGNOSIS — I10 ESSENTIAL HYPERTENSION: ICD-10-CM

## 2024-09-30 DIAGNOSIS — E87.1 HYPONATREMIA: ICD-10-CM

## 2024-09-30 DIAGNOSIS — Z00.00 MEDICARE ANNUAL WELLNESS VISIT, SUBSEQUENT: Primary | ICD-10-CM

## 2024-09-30 DIAGNOSIS — Z86.0100 PERSONAL HISTORY OF COLONIC POLYPS: ICD-10-CM

## 2024-09-30 PROCEDURE — G0439 PPPS, SUBSEQ VISIT: HCPCS | Performed by: INTERNAL MEDICINE

## 2024-09-30 PROCEDURE — G8427 DOCREV CUR MEDS BY ELIG CLIN: HCPCS | Performed by: INTERNAL MEDICINE

## 2024-09-30 PROCEDURE — 4004F PT TOBACCO SCREEN RCVD TLK: CPT | Performed by: INTERNAL MEDICINE

## 2024-09-30 PROCEDURE — 3078F DIAST BP <80 MM HG: CPT | Performed by: INTERNAL MEDICINE

## 2024-09-30 PROCEDURE — G0296 VISIT TO DETERM LDCT ELIG: HCPCS | Performed by: INTERNAL MEDICINE

## 2024-09-30 PROCEDURE — G8419 CALC BMI OUT NRM PARAM NOF/U: HCPCS | Performed by: INTERNAL MEDICINE

## 2024-09-30 PROCEDURE — 3074F SYST BP LT 130 MM HG: CPT | Performed by: INTERNAL MEDICINE

## 2024-09-30 PROCEDURE — 3017F COLORECTAL CA SCREEN DOC REV: CPT | Performed by: INTERNAL MEDICINE

## 2024-09-30 PROCEDURE — 1123F ACP DISCUSS/DSCN MKR DOCD: CPT | Performed by: INTERNAL MEDICINE

## 2024-09-30 PROCEDURE — 3023F SPIROM DOC REV: CPT | Performed by: INTERNAL MEDICINE

## 2024-09-30 PROCEDURE — 99214 OFFICE O/P EST MOD 30 MIN: CPT | Performed by: INTERNAL MEDICINE

## 2024-09-30 RX ORDER — FLUTICASONE PROPIONATE AND SALMETEROL 250; 50 UG/1; UG/1
1 POWDER RESPIRATORY (INHALATION) EVERY 12 HOURS
Qty: 60 EACH | Refills: 3 | Status: SHIPPED | OUTPATIENT
Start: 2024-09-30

## 2024-09-30 ASSESSMENT — LIFESTYLE VARIABLES
HAS A RELATIVE, FRIEND, DOCTOR, OR ANOTHER HEALTH PROFESSIONAL EXPRESSED CONCERN ABOUT YOUR DRINKING OR SUGGESTED YOU CUT DOWN: NO
HOW OFTEN DO YOU HAVE A DRINK CONTAINING ALCOHOL: 4 OR MORE TIMES A WEEK
HAVE YOU OR SOMEONE ELSE BEEN INJURED AS A RESULT OF YOUR DRINKING: NO
HOW OFTEN DURING THE LAST YEAR HAVE YOU HAD A FEELING OF GUILT OR REMORSE AFTER DRINKING: NEVER
HOW OFTEN DURING THE LAST YEAR HAVE YOU FAILED TO DO WHAT WAS NORMALLY EXPECTED FROM YOU BECAUSE OF DRINKING: NEVER
HOW OFTEN DURING THE LAST YEAR HAVE YOU NEEDED AN ALCOHOLIC DRINK FIRST THING IN THE MORNING TO GET YOURSELF GOING AFTER A NIGHT OF HEAVY DRINKING: NEVER
HOW OFTEN DURING THE LAST YEAR HAVE YOU BEEN UNABLE TO REMEMBER WHAT HAPPENED THE NIGHT BEFORE BECAUSE YOU HAD BEEN DRINKING: NEVER
HOW MANY STANDARD DRINKS CONTAINING ALCOHOL DO YOU HAVE ON A TYPICAL DAY: 1 OR 2
HOW OFTEN DURING THE LAST YEAR HAVE YOU FOUND THAT YOU WERE NOT ABLE TO STOP DRINKING ONCE YOU HAD STARTED: NEVER

## 2024-09-30 ASSESSMENT — PATIENT HEALTH QUESTIONNAIRE - PHQ9
SUM OF ALL RESPONSES TO PHQ9 QUESTIONS 1 & 2: 0
10. IF YOU CHECKED OFF ANY PROBLEMS, HOW DIFFICULT HAVE THESE PROBLEMS MADE IT FOR YOU TO DO YOUR WORK, TAKE CARE OF THINGS AT HOME, OR GET ALONG WITH OTHER PEOPLE: SOMEWHAT DIFFICULT
1. LITTLE INTEREST OR PLEASURE IN DOING THINGS: NOT AT ALL
SUM OF ALL RESPONSES TO PHQ QUESTIONS 1-9: 1
5. POOR APPETITE OR OVEREATING: NOT AT ALL
4. FEELING TIRED OR HAVING LITTLE ENERGY: NOT AT ALL
SUM OF ALL RESPONSES TO PHQ QUESTIONS 1-9: 1
7. TROUBLE CONCENTRATING ON THINGS, SUCH AS READING THE NEWSPAPER OR WATCHING TELEVISION: NOT AT ALL
8. MOVING OR SPEAKING SO SLOWLY THAT OTHER PEOPLE COULD HAVE NOTICED. OR THE OPPOSITE, BEING SO FIGETY OR RESTLESS THAT YOU HAVE BEEN MOVING AROUND A LOT MORE THAN USUAL: NOT AT ALL
SUM OF ALL RESPONSES TO PHQ QUESTIONS 1-9: 1
3. TROUBLE FALLING OR STAYING ASLEEP: SEVERAL DAYS
2. FEELING DOWN, DEPRESSED OR HOPELESS: NOT AT ALL
9. THOUGHTS THAT YOU WOULD BE BETTER OFF DEAD, OR OF HURTING YOURSELF: NOT AT ALL
6. FEELING BAD ABOUT YOURSELF - OR THAT YOU ARE A FAILURE OR HAVE LET YOURSELF OR YOUR FAMILY DOWN: NOT AT ALL
SUM OF ALL RESPONSES TO PHQ QUESTIONS 1-9: 1

## 2024-09-30 ASSESSMENT — ENCOUNTER SYMPTOMS
VOMITING: 0
PHOTOPHOBIA: 0
WHEEZING: 0
SHORTNESS OF BREATH: 0
DIARRHEA: 0
ABDOMINAL PAIN: 0
NAUSEA: 0

## 2024-09-30 NOTE — PROGRESS NOTES
Drawing Test (CDT): (!) Abnormal  Words recalled: 2 Words Recalled  Total Score: (!) 2  Total Score Interpretation: Abnormal Mini-Cog  Interventions:  Not interested for additional medication at this time.  Will get B12 and folate level  See AVS for additional education material           General HRA Questions:  Select all that apply: (!) New or Increased Pain  Interventions - Pain:  Currently on gabapentin.  No change from baseline  See AVS for additional education material       Poor Eating Habits/Diet:  Do you eat balanced/healthy meals regularly?: (!) No  Interventions:  See AVS for additional education material    Abnormal BMI (underweight):  Body mass index is 17.31 kg/m². (!) Abnormal  Interventions:  See AVS for additional education material    Dentist Screen:  Have you seen the dentist within the past year?: (!) No    Intervention:  Patient has dentures.  No need to see dentist     Vision Screen:  Do you have difficulty driving, watching TV, or doing any of your daily activities because of your eyesight?: (!) Yes  Have you had an eye exam within the past year?: (!) No  Interventions:   Patient encouraged to make appointment with their eye specialist       Tobacco Use:    Tobacco Use      Smoking status: Every Day        Packs/day: 1.00        Years: 1 pack/day for 55.7 years (55.7 ttl pk-yrs)        Types: Cigarettes        Start date: 1/1/1969        Passive exposure: Past      Smokeless tobacco: Former      Tobacco comments: using nicoderm     Interventions:  See AVS for additional education material                      Objective   Vitals:    09/30/24 0942   BP: 126/74   Pulse: 78   SpO2: 97%   Weight: 47.2 kg (104 lb)   Height: 1.651 m (5' 5\")      Body mass index is 17.31 kg/m².                  Allergies   Allergen Reactions    Propoxyphene Nausea Only     Prior to Visit Medications    Medication Sig Taking? Authorizing Provider   fluticasone-salmeterol (ADVAIR DISKUS) 250-50 MCG/ACT AEPB diskus

## 2024-10-03 LAB
ANION GAP SERPL CALCULATED.3IONS-SCNC: 6 MMOL/L (ref 4–16)
BUN BLDV-MCNC: 5 MG/DL (ref 8–26)
CALCIUM SERPL-MCNC: 8.5 MG/DL (ref 8.5–10.4)
CHLORIDE BLD-SCNC: 95 MMOL/L (ref 98–111)
CO2: 26 MMOL/L (ref 21–31)
CREAT SERPL-MCNC: 0.6 MG/DL (ref 0.7–1.3)
EGFR (CKD-EPI): 104 ML/MIN/1.73 M2
FOLATE: 15.9 NG/ML
GLUCOSE BLD-MCNC: 82 MG/DL (ref 70–99)
HCT VFR BLD CALC: 39.5 % (ref 40–50)
HEMOGLOBIN: 13.3 G/DL (ref 13.5–16.5)
MAGNESIUM: 2 MG/DL (ref 1.7–2.4)
MCH RBC QN AUTO: 33.3 PG (ref 27–33)
MCHC RBC AUTO-ENTMCNC: 33.8 G/DL (ref 32–36)
MCV RBC AUTO: 98.7 FL (ref 82–97)
PDW BLD-RTO: 15 % (ref 12.3–17)
PLATELET # BLD: 164 THOU/MCL (ref 140–375)
PMV BLD AUTO: 7.9 FL (ref 7.4–11.5)
POTASSIUM SERPL-SCNC: 3.8 MMOL/L (ref 3.6–5.1)
RBC # BLD: 4 MIL/MCL (ref 4.4–5.8)
SODIUM BLD-SCNC: 127 MMOL/L (ref 135–145)
VITAMIN B-12: 454 PG/ML (ref 232–1245)
WBC # BLD: 4 THOU/MCL (ref 3.6–10.5)

## 2024-10-04 DIAGNOSIS — E87.1 HYPONATREMIA: ICD-10-CM

## 2024-10-04 RX ORDER — SODIUM CHLORIDE 1 G/1
1 TABLET ORAL 2 TIMES DAILY
Qty: 60 TABLET | Refills: 0 | Status: SHIPPED | OUTPATIENT
Start: 2024-10-04

## 2024-10-04 NOTE — RESULT ENCOUNTER NOTE
Hyponatremia which is chronic most likely due to excessive alcohol intake.  Advised him to reduce alcohol intake  Patient need to restart sodium chloride.  Order sent to the pharmacy  Mild other lab abnormalities.  Mild anemia already referred to GI

## 2024-11-04 ENCOUNTER — TELEPHONE (OUTPATIENT)
Dept: INTERNAL MEDICINE CLINIC | Age: 69
End: 2024-11-04

## 2024-11-04 ENCOUNTER — TELEPHONE (OUTPATIENT)
Dept: ADMINISTRATIVE | Age: 69
End: 2024-11-04

## 2024-11-04 NOTE — TELEPHONE ENCOUNTER
Received notification from [employer] that patient's authorization for Prolia (Reference number: [id number]) has been extended.  PA Approval for Prolia is now valid from 01/01/25-12/31/25.  Letter attached.    If this requires a response please respond to the pool ( P [facility] MEDICATION PRE-AUTH).      Thank you please advise patient.

## 2025-01-10 ENCOUNTER — TELEPHONE (OUTPATIENT)
Dept: INTERNAL MEDICINE CLINIC | Age: 70
End: 2025-01-10

## 2025-01-10 NOTE — TELEPHONE ENCOUNTER
Call placed to patient to get appointment from 1/7/25 rescheduled. Called phone number in chart and they stated that they were not Nicola. Number removed from chart.

## 2025-01-10 NOTE — TELEPHONE ENCOUNTER
Called and spoke with Pt wife Anju and she states that Pt is having surgery on 2/4/25 and they will call back to reschedule once all that is over.

## 2025-01-17 ENCOUNTER — TELEPHONE (OUTPATIENT)
Dept: INTERNAL MEDICINE CLINIC | Age: 70
End: 2025-01-17

## 2025-04-16 ENCOUNTER — RESULTS FOLLOW-UP (OUTPATIENT)
Dept: INTERNAL MEDICINE CLINIC | Age: 70
End: 2025-04-16

## (undated) DEVICE — NEEDLE HYPO 22GA L1.5IN BLK POLYPR HUB S STL REG BVL STR

## (undated) DEVICE — SUTURE PROL SZ 0 L18IN NONABSORBABLE BLU MO-6 L26MM 1/2 CIR C845G

## (undated) DEVICE — SUTURE PROL SZ 0 L30IN NONABSORBABLE BLU MO-6 L26MM 1/2 CIR 8418H

## (undated) DEVICE — STERILE POLYISOPRENE POWDER-FREE SURGICAL GLOVES: Brand: PROTEXIS

## (undated) DEVICE — SOLUTION IV IRRIG POUR BRL 0.9% SODIUM CHL 2F7124

## (undated) DEVICE — CHLORAPREP 26ML ORANGE

## (undated) DEVICE — KIT OR ROOM TURNOVER W/STRAP

## (undated) DEVICE — PENROSE DRAIN 12" X 1/2": Brand: CARDINAL HEALTH

## (undated) DEVICE — ADHESIVE SKIN CLSR 0.7ML TOP DERMBND ADV

## (undated) DEVICE — SUTURE VCRL SZ 2-0 L18IN ABSRB UD POLYGLACTIN 910 BRAID TIE J111T

## (undated) DEVICE — SUTURE VCRL SZ 2-0 L27IN ABSRB UD L26MM SH 1/2 CIR J417H

## (undated) DEVICE — SHEET,DRAPE,40X58,STERILE: Brand: MEDLINE

## (undated) DEVICE — LIGHT HANDLE: Brand: DEVON

## (undated) DEVICE — BLADE CLIPPER GEN PURP NS

## (undated) DEVICE — SYRINGE MED 10ML TRNSLUC BRL PLUNG BLK MRK POLYPR CTRL

## (undated) DEVICE — SHEET, T, LAPAROTOMY, STERILE: Brand: MEDLINE

## (undated) DEVICE — SUTURE VCRL SZ 3-0 L27IN ABSRB UD L26MM SH 1/2 CIR J416H

## (undated) DEVICE — MAJOR SET UP PK